# Patient Record
Sex: FEMALE | Race: WHITE | NOT HISPANIC OR LATINO | Employment: OTHER | URBAN - METROPOLITAN AREA
[De-identification: names, ages, dates, MRNs, and addresses within clinical notes are randomized per-mention and may not be internally consistent; named-entity substitution may affect disease eponyms.]

---

## 2017-01-11 ENCOUNTER — ALLSCRIPTS OFFICE VISIT (OUTPATIENT)
Dept: OTHER | Facility: OTHER | Age: 37
End: 2017-01-11

## 2017-04-12 ENCOUNTER — ALLSCRIPTS OFFICE VISIT (OUTPATIENT)
Dept: OTHER | Facility: OTHER | Age: 37
End: 2017-04-12

## 2017-06-13 ENCOUNTER — ALLSCRIPTS OFFICE VISIT (OUTPATIENT)
Dept: OTHER | Facility: OTHER | Age: 37
End: 2017-06-13

## 2017-07-25 ENCOUNTER — GENERIC CONVERSION - ENCOUNTER (OUTPATIENT)
Dept: OTHER | Facility: OTHER | Age: 37
End: 2017-07-25

## 2017-09-05 ENCOUNTER — GENERIC CONVERSION - ENCOUNTER (OUTPATIENT)
Dept: OTHER | Facility: OTHER | Age: 37
End: 2017-09-05

## 2017-09-14 ENCOUNTER — GENERIC CONVERSION - ENCOUNTER (OUTPATIENT)
Dept: OTHER | Facility: OTHER | Age: 37
End: 2017-09-14

## 2017-10-31 RX ORDER — ELETRIPTAN HYDROBROMIDE 40 MG/1
40 TABLET, FILM COATED ORAL ONCE AS NEEDED
COMMUNITY
End: 2018-06-08 | Stop reason: SDUPTHER

## 2017-10-31 RX ORDER — TIZANIDINE HYDROCHLORIDE 2 MG/1
2 CAPSULE, GELATIN COATED ORAL AS NEEDED
COMMUNITY
End: 2018-10-05 | Stop reason: SDUPTHER

## 2017-10-31 RX ORDER — IBUPROFEN 400 MG/1
TABLET ORAL EVERY 6 HOURS PRN
COMMUNITY
End: 2020-12-07 | Stop reason: HOSPADM

## 2017-10-31 NOTE — PRE-PROCEDURE INSTRUCTIONS
Pre-Surgery Instructions:   Medication Instructions    eletriptan (RELPAX) 40 MG tablet Instructed patient per Anesthesia Guidelines   GABAPENTIN PO Instructed patient per Anesthesia Guidelines   ibuprofen (MOTRIN) 400 mg tablet Instructed patient per Anesthesia Guidelines   TiZANidine (ZANAFLEX) 2 MG capsule Instructed patient per Anesthesia Guidelines  Per anesthesia patient was told to stop NSAIDS and supplements one week preop and no medications are needed on morning of surgery

## 2017-11-08 ENCOUNTER — ANESTHESIA EVENT (OUTPATIENT)
Dept: PERIOP | Facility: HOSPITAL | Age: 37
End: 2017-11-08
Payer: COMMERCIAL

## 2017-11-09 ENCOUNTER — ANESTHESIA (OUTPATIENT)
Dept: PERIOP | Facility: HOSPITAL | Age: 37
End: 2017-11-09
Payer: COMMERCIAL

## 2017-11-09 ENCOUNTER — HOSPITAL ENCOUNTER (OUTPATIENT)
Facility: HOSPITAL | Age: 37
Setting detail: OUTPATIENT SURGERY
Discharge: HOME | End: 2017-11-09
Attending: OBSTETRICS & GYNECOLOGY | Admitting: OBSTETRICS & GYNECOLOGY
Payer: COMMERCIAL

## 2017-11-09 VITALS
WEIGHT: 154 LBS | BODY MASS INDEX: 27.29 KG/M2 | HEIGHT: 63 IN | OXYGEN SATURATION: 100 % | HEART RATE: 75 BPM | SYSTOLIC BLOOD PRESSURE: 108 MMHG | TEMPERATURE: 97.7 F | DIASTOLIC BLOOD PRESSURE: 60 MMHG | RESPIRATION RATE: 16 BRPM

## 2017-11-09 DIAGNOSIS — N92.4 EXCESSIVE BLEEDING IN PREMENOPAUSAL PERIOD: ICD-10-CM

## 2017-11-09 LAB — EXT PREGNANCY TEST URINE: NEGATIVE

## 2017-11-09 PROCEDURE — 81025 URINE PREGNANCY TEST: CPT | Performed by: OBSTETRICS & GYNECOLOGY

## 2017-11-09 PROCEDURE — 88305 TISSUE EXAM BY PATHOLOGIST: CPT | Performed by: OBSTETRICS & GYNECOLOGY

## 2017-11-09 RX ORDER — ONDANSETRON 2 MG/ML
INJECTION INTRAMUSCULAR; INTRAVENOUS AS NEEDED
Status: DISCONTINUED | OUTPATIENT
Start: 2017-11-09 | End: 2017-11-09 | Stop reason: SURG

## 2017-11-09 RX ORDER — ONDANSETRON 2 MG/ML
4 INJECTION INTRAMUSCULAR; INTRAVENOUS ONCE AS NEEDED
Status: DISCONTINUED | OUTPATIENT
Start: 2017-11-09 | End: 2017-11-09 | Stop reason: HOSPADM

## 2017-11-09 RX ORDER — SODIUM CHLORIDE 9 MG/ML
INJECTION, SOLUTION INTRAVENOUS AS NEEDED
Status: DISCONTINUED | OUTPATIENT
Start: 2017-11-09 | End: 2017-11-09 | Stop reason: HOSPADM

## 2017-11-09 RX ORDER — ACETAMINOPHEN 325 MG/1
650 TABLET ORAL EVERY 4 HOURS PRN
Status: DISCONTINUED | OUTPATIENT
Start: 2017-11-09 | End: 2017-11-09 | Stop reason: HOSPADM

## 2017-11-09 RX ORDER — PROPOFOL 10 MG/ML
INJECTION, EMULSION INTRAVENOUS AS NEEDED
Status: DISCONTINUED | OUTPATIENT
Start: 2017-11-09 | End: 2017-11-09 | Stop reason: SURG

## 2017-11-09 RX ORDER — ONDANSETRON 2 MG/ML
4 INJECTION INTRAMUSCULAR; INTRAVENOUS EVERY 6 HOURS PRN
Status: DISCONTINUED | OUTPATIENT
Start: 2017-11-09 | End: 2017-11-09 | Stop reason: HOSPADM

## 2017-11-09 RX ORDER — SODIUM CHLORIDE 9 MG/ML
125 INJECTION, SOLUTION INTRAVENOUS CONTINUOUS
Status: DISCONTINUED | OUTPATIENT
Start: 2017-11-09 | End: 2017-11-09 | Stop reason: HOSPADM

## 2017-11-09 RX ORDER — OXYCODONE HYDROCHLORIDE 5 MG/1
10 TABLET ORAL EVERY 4 HOURS PRN
Status: DISCONTINUED | OUTPATIENT
Start: 2017-11-09 | End: 2017-11-09 | Stop reason: HOSPADM

## 2017-11-09 RX ORDER — OXYCODONE HCL 5 MG/5 ML
5 SOLUTION, ORAL ORAL EVERY 4 HOURS PRN
Status: DISCONTINUED | OUTPATIENT
Start: 2017-11-09 | End: 2017-11-09 | Stop reason: HOSPADM

## 2017-11-09 RX ORDER — FENTANYL CITRATE/PF 50 MCG/ML
25 SYRINGE (ML) INJECTION
Status: DISCONTINUED | OUTPATIENT
Start: 2017-11-09 | End: 2017-11-09 | Stop reason: HOSPADM

## 2017-11-09 RX ORDER — MIDAZOLAM HYDROCHLORIDE 1 MG/ML
INJECTION INTRAMUSCULAR; INTRAVENOUS AS NEEDED
Status: DISCONTINUED | OUTPATIENT
Start: 2017-11-09 | End: 2017-11-09 | Stop reason: SURG

## 2017-11-09 RX ORDER — KETOROLAC TROMETHAMINE 30 MG/ML
INJECTION, SOLUTION INTRAMUSCULAR; INTRAVENOUS AS NEEDED
Status: DISCONTINUED | OUTPATIENT
Start: 2017-11-09 | End: 2017-11-09 | Stop reason: SURG

## 2017-11-09 RX ORDER — FERRIC SUBSULFATE 0.21 G/G
LIQUID TOPICAL AS NEEDED
Status: DISCONTINUED | OUTPATIENT
Start: 2017-11-09 | End: 2017-11-09 | Stop reason: HOSPADM

## 2017-11-09 RX ORDER — FENTANYL CITRATE 50 UG/ML
INJECTION, SOLUTION INTRAMUSCULAR; INTRAVENOUS AS NEEDED
Status: DISCONTINUED | OUTPATIENT
Start: 2017-11-09 | End: 2017-11-09 | Stop reason: SURG

## 2017-11-09 RX ADMIN — FENTANYL CITRATE 50 MCG: 50 INJECTION INTRAMUSCULAR; INTRAVENOUS at 08:12

## 2017-11-09 RX ADMIN — FENTANYL CITRATE 50 MCG: 50 INJECTION INTRAMUSCULAR; INTRAVENOUS at 08:50

## 2017-11-09 RX ADMIN — DEXAMETHASONE SODIUM PHOSPHATE 8 MG: 10 INJECTION INTRAMUSCULAR; INTRAVENOUS at 08:09

## 2017-11-09 RX ADMIN — MIDAZOLAM HYDROCHLORIDE 2 MG: 1 INJECTION, SOLUTION INTRAMUSCULAR; INTRAVENOUS at 07:58

## 2017-11-09 RX ADMIN — SODIUM CHLORIDE 125 ML/HR: 0.9 INJECTION, SOLUTION INTRAVENOUS at 07:04

## 2017-11-09 RX ADMIN — PROPOFOL 200 MG: 10 INJECTION, EMULSION INTRAVENOUS at 08:02

## 2017-11-09 RX ADMIN — ONDANSETRON HYDROCHLORIDE 4 MG: 2 INJECTION, SOLUTION INTRAVENOUS at 08:31

## 2017-11-09 RX ADMIN — KETOROLAC TROMETHAMINE 30 MG: 30 INJECTION, SOLUTION INTRAMUSCULAR at 08:32

## 2017-11-09 RX ADMIN — SODIUM CHLORIDE 125 ML/HR: 0.9 INJECTION, SOLUTION INTRAVENOUS at 09:16

## 2017-11-09 NOTE — ANESTHESIA PREPROCEDURE EVALUATION
Review of Systems/Medical History  Patient summary reviewed  Chart reviewed  No history of anesthetic complications     Cardiovascular    Comment: palpitations,  Pulmonary  Negative pulmonary ROS ,        GI/Hepatic  Negative GI/hepatic ROS               Endo/Other     GYN  Negative gynecology ROS          Hematology  Negative hematology ROS      Musculoskeletal  Negative musculoskeletal ROS Back pain , chronic back pain and lumbar pain,        Neurology    Headaches,    Psychology   Negative psychology ROS            Physical Exam    Airway    Mallampati score: II  TM Distance: >3 FB  Neck ROM: full     Dental   No notable dental hx     Cardiovascular  Rhythm: regular, Rate: normal, Cardiovascular exam normal    Pulmonary  Pulmonary exam normal Breath sounds clear to auscultation,     Other Findings        Anesthesia Plan  ASA Score- 2       Anesthesia Type- general with ASA Monitors  Additional Monitors:   Airway Plan:           Induction- intravenous  Informed Consent- Anesthetic plan and risks discussed with patient

## 2017-11-09 NOTE — OP NOTE
OPERATIVE REPORT  PATIENT NAME: Keren Costa    :  1980  MRN: [id number]  Pt Location: [facility] 04    SURGERY DATE: 2017    Surgeon(s) and Role:     * Bolivar Benavides DO - Primary     * Amy Sierra MD - Assisting    Preop Diagnosis:  Excessive bleeding in premenopausal period [N92 4]    Post-Op Diagnosis Codes:     * Excessive bleeding in premenopausal period [N92 4]    Procedure(s) (LRB):  ABLATION ENDOMETRIAL  /ANDREI (N/A)  DILATATION AND CURETTAGE (D&C) WITH HYSTEROSCOPY (N/A)    Specimen(s):  ID Type Source Tests Collected by Time Destination   1 : endometrial curretings Tissue Endometrium TISSUE EXAM Bolivar Benavides DO 2017 0834        Estimated Blood Loss:   Minimal    Drains:  None     Anesthesia Type:   General    Operative Indications:  Excessive bleeding in premenopausal period [N92 4]    Operative Findings:  Normal appearing uterine cavity  Cervical length 5cm, Uterine cavity length 5cm- uterus sounded to 10 5cm  Grade 2 uterine prolapse with traction  Complications:   None    Procedure and Technique:  Patient was taken to the operating room were a time out was performed to confirm correct patient and correct procedure  General LMA anesthesia (LMA) was administered and the patient was positioned on the OR table in the dorsal lithotomy position  All pressure points were padded and a lyndsey hugger was placed to maintain control of core body temperature  A bimanual exam was performed and the uterus was noted to be anteverted, normal in size and consistency with no palpable adnexal masses or fullness  The patient was prepped and draped in the usual sterile fashion  A straight catheter was introduced into the bladder, which was drained of 100cc of clear yellow urine  A weighted speculum was inserted into the vagina and a Can retractor was used to visualize the anterior lip of the cervix, which was then grasped with a single toothed tenaculum   There was moderate descent of the cervix with gentle traction of the tenaculum  The uterus was sounded to 11cm, cervical length was estimated to be 6cm, yielding a total uterine cavity length of 5cm  The cervix was serially dilated to allow for passage of the hysteroscope  Hysteroscope was introduced under direct visualization using normal saline solution as the distention media  Hysteroscope was advanced to the uterine fundus and the entire uterine cavity was inspected in a systematic manner  There was noted to be a normal appearing uterine cavity, both tubal ostia were visualized  Hysteroscope was withdrawn and gentle sharp curetting was performed, starting at the 12'oclock position and rotating a total of 360 degrees to cover all surfaces  Scant endometrial tissue was obtained and sent for pathology  The Qiana device cavity length was set to 5cm  The device was inserted through the cervix and advanced to the uterine fundus  The device was deployed and the uterine width was within the green normal range  A test of the system was performed and cavity integrity and proper placement of the device was confirmed  The Qiana device was activated for 60 seconds  At the conclusion of the cycle the device was completely retracted prior to its removal from the uterine cavity  Inspection of the bipolar electrode showed evidence of burnt endometrial tissue  The single toothed tenaculum was removed from the anterior lip of the cervix  There was mild bleeding from the right tenaculum site which improved with direct pressure and Monsel's solution  Hemostasis was confirmed and the weighted speculum was then removed from the vagina  At the conclusion of the procedure, all needle, sponge, and instrument counts were noted to be correct x2  Dr Vineet Ingram was present and participated in all key portions of the case      Patient Disposition:  PACU     SIGNATURE: Agatha Page MD  DATE: November 9, 2017  TIME: 8:56 AM  Attending Physician/Surgeon Statement  I was present for and participated in all key surgical aspects of this patient's care  I agree with the resident's documentation as stated above

## 2017-11-09 NOTE — DISCHARGE INSTRUCTIONS
Endometrial Ablation   WHAT YOU NEED TO KNOW:   Endometrial ablation is a procedure to destroy the endometrium (lining of your uterus)  You may need this procedure if you have heavy or abnormal vaginal bleeding  DISCHARGE INSTRUCTIONS:   Call 911 for any of the following:   · You feel lightheaded, short of breath, and have chest pain  · You cough up blood  Seek care immediately if:   · Your arm or leg feels warm, tender, and painful  It may look swollen and red  · You feel dizzy, weak, and confused  · You cannot stop vomiting  · You have severe pain  · You are not able to urinate  Contact your healthcare provider if:   · You have a fever  · You have vaginal bleeding and it is not time for your monthly period  · The bleeding during your monthly period has not decreased  · You have pain when you urinate or see blood in your urine  · You have questions or concerns about your condition or care  Medicines:   · Medicines  can help decrease pain, calm your stomach, and control vomiting  · Take your medicine as directed  Contact your healthcare provider if you think your medicine is not helping or if you have side effects  Tell him or her if you are allergic to any medicine  Keep a list of the medicines, vitamins, and herbs you take  Include the amounts, and when and why you take them  Bring the list or the pill bottles to follow-up visits  Carry your medicine list with you in case of an emergency  Activity:  Ask when you can return to your usual activities  Do not have sex or use tampons or douches for 6 weeks after your procedure, or as directed  Birth control: You may still need to use birth control to prevent pregnancy  Pregnancy risks, such as a miscarriage and tubal pregnancy, are higher after this procedure  Talk to your healthcare provider about birth control or pregnancy after endometrial ablation    Follow up with your healthcare provider as directed:  Write down your questions so you remember to ask them during your visits  © 2017 [employer] Information is for End User's use only and may not be sold, redistributed or otherwise used for commercial purposes  All illustrations and images included in CareNotes® are the copyrighted property of A D A M [employer] Inc  or [employer]  The above information is an  only  It is not intended as medical advice for individual conditions or treatments  Talk to your doctor, nurse or pharmacist before following any medical regimen to see if it is safe and effective for you

## 2017-12-19 ENCOUNTER — ALLSCRIPTS OFFICE VISIT (OUTPATIENT)
Dept: OTHER | Facility: OTHER | Age: 37
End: 2017-12-19

## 2017-12-26 ENCOUNTER — HOSPITAL ENCOUNTER (EMERGENCY)
Facility: HOSPITAL | Age: 37
Discharge: HOME | End: 2017-12-26
Payer: COMMERCIAL

## 2017-12-26 VITALS
DIASTOLIC BLOOD PRESSURE: 73 MMHG | RESPIRATION RATE: 18 BRPM | HEART RATE: 117 BPM | WEIGHT: 161.82 LBS | BODY MASS INDEX: 28.66 KG/M2 | OXYGEN SATURATION: 98 % | TEMPERATURE: 98 F | SYSTOLIC BLOOD PRESSURE: 128 MMHG

## 2017-12-26 DIAGNOSIS — T81.31XA WOUND DISRUPTION, POST-OP, SKIN, INITIAL ENCOUNTER: Primary | ICD-10-CM

## 2017-12-26 PROCEDURE — 99283 EMERGENCY DEPT VISIT LOW MDM: CPT

## 2017-12-26 RX ORDER — BUPIVACAINE HYDROCHLORIDE AND EPINEPHRINE 5; 5 MG/ML; UG/ML
10 INJECTION, SOLUTION PERINEURAL ONCE
Status: COMPLETED | OUTPATIENT
Start: 2017-12-26 | End: 2017-12-26

## 2017-12-26 RX ORDER — BUPIVACAINE HYDROCHLORIDE AND EPINEPHRINE 5; 5 MG/ML; UG/ML
10 INJECTION, SOLUTION PERINEURAL ONCE
Status: DISCONTINUED | OUTPATIENT
Start: 2017-12-26 | End: 2017-12-26

## 2017-12-26 RX ADMIN — BUPIVACAINE HYDROCHLORIDE AND EPINEPHRINE BITARTRATE 10 ML: 5; .005 INJECTION, SOLUTION PERINEURAL at 18:29

## 2017-12-26 NOTE — ED PROVIDER NOTES
History  Chief Complaint   Patient presents with    Post-op Problem     Pt reports labiaplasty done today outpt around 26 [id number], discahrged to home and has had increased bleeding, referred by surgeon to ED  HPI    Prior to Admission Medications   Prescriptions Last Dose Informant Patient Reported? Taking? GABAPENTIN PO   Yes No   Sig: Take 200 mg by mouth daily at bedtime   TiZANidine (ZANAFLEX) 2 MG capsule   Yes No   Sig: Take 2 mg by mouth as needed for muscle spasms   eletriptan (RELPAX) 40 MG tablet   Yes No   Sig: Take 40 mg by mouth once as needed for migraine may repeat in 2 hours if necessary   ibuprofen (MOTRIN) 400 mg tablet   Yes No   Sig: Take by mouth every 6 (six) hours as needed for mild pain      Facility-Administered Medications: None       Past Medical History:   Diagnosis Date    Back pain     Irregular heart beat     Palpitations    Menorrhagia     Migraines        Past Surgical History:   Procedure Laterality Date    OVARIAN CYST SURGERY      MD HYSTEROSCOPY,W/ENDO BX N/A 11/9/2017    Procedure: DILATATION AND CURETTAGE (D&C) WITH HYSTEROSCOPY;  Surgeon: Noel Wilson DO;  Location: [facility];  Service: Gynecology    MD HYSTEROSCOPY,W/ENDOMETRIAL ABLATION N/A 11/9/2017    Procedure: ABLATION ENDOMETRIAL  Dunham Aquas;  Surgeon: Noel Wilson DO;  Location: [facility];  Service: Gynecology    WISDOM TOOTH EXTRACTION         History reviewed  No pertinent family history  I have reviewed and agree with the history as documented      Social History   Substance Use Topics    Smoking status: Never Smoker    Smokeless tobacco: Never Used    Alcohol use No        Review of Systems    Physical Exam  ED Triage Vitals [12/26/17 1728]   Temperature Pulse Respirations Blood Pressure SpO2   98 °F (36 7 °C) (!) 117 18 128/73 98 %      Temp Source Heart Rate Source Patient Position - Orthostatic VS BP Location FiO2 (%)   Oral Monitor Sitting Right arm --      Pain Score       3 Orthostatic Vital Signs  Vitals:    12/26/17 1728   BP: 128/73   Pulse: (!) 117   Patient Position - Orthostatic VS: Sitting       Physical Exam  Bleeding noted from right labia minora at area of suture in the middle of the surgical site  Left labia with some clot adhered  Good hemostasis on left  ED Medications  Medications   bupivacaine-epinephrine (MARCAINE/EPINEPHRINE) 0 5 %-1:[id number] injection 10 mL (not administered)       Diagnostic Studies  Results Reviewed     None                 No orders to display              Procedures  Procedures- Area of bleeding injected with  5% bupivicaine with epi   2 sutures of 3-O vicryl placed above and below the area of bleeding  Good hemostasis noted after observing for 5 minutes  Phone Contacts  ED Phone Contact    ED Course  ED Course        Hemostasis achieved after sutures placed  Patient in stable condition  MDM  Number of Diagnoses or Management Options  Risk of Complications, Morbidity, and/or Mortality  Presenting problems: minimal  Diagnostic procedures: moderate  Management options: moderate      CritCare Time    Disposition  Final diagnoses:   None     ED Disposition     None      Follow-up Information    None       Patient's Medications   Discharge Prescriptions    No medications on file     No discharge procedures on file      ED Provider  Electronically Signed by           Ewing Holter, DO  12/26/17 2098

## 2017-12-26 NOTE — ED NOTES
Call made to [facility] of life on-call service   Pending call back from Dr Millicent Dunaway (GYN)     Jocelyn Butler RN  12/26/17 4264

## 2017-12-26 NOTE — ED NOTES
Dr Sherita Valdivia responded at this time, will be down at this time        Bryan Rosen, TEA  12/26/17 3869

## 2017-12-27 NOTE — PROGRESS NOTES
Chief Complaint   Patient presents for Botox injections  Current Meds   1  Botox 200 UNIT Injection Solution Reconstituted; Pt gets injections once every 91 days     for chronic migraines; Therapy: (Recorded:80Ecs1793) to Recorded  2  Gabapentin 100 MG Oral Capsule; TAKE 2 CAPSULES AT BEDTIME; Therapy: 36PKK6613 to (Arielle Gil)  Requested for: 49LJO0650; Last     Rx:[id number] Ordered  3  Ketorolac Tromethamine 10 MG Oral Tablet; TAKE 1 TABLET EVERY 8 HOURS AS     NEEDED FOR PAIN;     Therapy: 65ARQ5036 to (Last Rx:13Mar2017)  Requested for: 06UTC1512 Ordered  4  Relpax 40 MG Oral Tablet; TAKE 1 TABLET AT ONSET OF MIGRAINE  MAY REPEAT IN 2     HOURS  MAX 2 DOSES IN 24 HOURS, NO MORE THAN 3 DAYS PER WEEK  9 TABLETS     MONTHLY; Therapy: 87GYK5457 to ([phone])  Requested for: 46Xps4166; Last     Rx:[id number] Ordered  5  TiZANidine HCl - 2 MG Oral Capsule; 1-2 tabs qhs prn neck pain; Therapy: 00LMZ9337 to (Evaluate:06Jan2017)  Requested for: 65Azt6747; Last     Rx:[id number] Ordered    Allergies   1  No Known Drug Allergies    Vitals    Recorded: 16RSV4742 08:53AM   Temperature 59 0 F   Systolic 401   Diastolic 80     Procedure   Procedure: Headache botox injection  Indication: Chronic migraine headache  Risks, benefits and alternatives were discussed with the patient  We discussed possible complications, including infection,-- bleeding-- and-- allergic reaction  Written consent was obtained prior to the procedure  The site was prepped with an alcohol swab  Anesthesia: No anesthesia was needed  Procedure Note:      200 --Mml of Botox-A and       5 unit(s) was injected into the procerus muscle  5 unit(s) was injected into the  right  muscle  5 unit(s) was injected into the  left  muscle  10 unit(s) was injected into the  right frontalis muscle  10 unit(s) was injected into the  left frontalis muscle      20 unit(s) was injected into the  right temporalis muscle  20 unit(s) was injected into the  left temporalis muscle  15 unit(s) was injected into the  right occipitalis muscle  15 unit(s) was injected into the  left occipitalis muscle  10 unit(s) was injected into the  right cervical paraspinal muscle  10 unit(s) was injected into the  left cervical paraspinal muscle  15 unit(s) was injected into the  right trapezius muscle  15 unit(s) was injected into the  left trapezius muscle  A total of 155units were used  A total of 45units were discarded  Botox Lot:  Lot number: [id number] -- Expiration date: JUNE 2020  Patient Status:  the patient tolerated the procedure well  Complications:  there were no complications  Follow-up in the office in 3 month(s)  Assessment   1  Chronic migraine w/o aura w/o status migrainosus, not intractable (346 70) (G43 709)    Plan   Chronic migraine w/o aura w/o status migrainosus, not intractable    · Administered: Botox 200 UNIT Injection Solution Reconstituted  Rx By: Kristina JUAREZ;For: Chronic migraine w/o aura w/o status migrainosus, not     intractable; Dose of 200 UNIT; Other; VIJAYA = N; Administered: 12/19/2017 9:02:00 AM   · Chemodenervation of muscles innervated by facial, trigeminal, c-spine, accessory    nerves - POC; Status:Need Information - Financial Authorization; Requested    for:65Ptt5620;   Perform: In Office; Due:21Pnc8733; Ordered; For:Chronic migraine w/o aura w/o status     migrainosus, not intractable; Ordered By:Lou Snyder;   · Follow-up visit in 3 months Evaluation and Treatment  Follow-up  Status: Complete     Done: 76WDJ0972  Ordered; For: Chronic migraine w/o aura w/o status migrainosus, not intractable;      Ordered By: Carlene Medeiros  Performed:   Due: 87ELH9569; Last Updated     By: Renetta Strange; 12/19/2017 9:50:19 AM    Future Appointments      Date/Time Provider Specialty Site   01/11/2018 09:00 AM Carlene Medeiros, [facility]  [facility]  [place]   03/19/2018 11:15 AM Marcella Avila, [facility]  [id number]     Signatures    Electronically signed by :  Radha Huddleston, [facility]; Dec 19 2017  9:17AM EST                       (Author)     Electronically signed by : Aguilar Quach MD; Dec 26 2017  4:18PM EST                       (Author)

## 2018-01-11 ENCOUNTER — GENERIC CONVERSION - ENCOUNTER (OUTPATIENT)
Dept: OTHER | Facility: OTHER | Age: 38
End: 2018-01-11

## 2018-01-12 VITALS
BODY MASS INDEX: 26.86 KG/M2 | SYSTOLIC BLOOD PRESSURE: 125 MMHG | HEART RATE: 105 BPM | HEIGHT: 63 IN | DIASTOLIC BLOOD PRESSURE: 84 MMHG | WEIGHT: 151.56 LBS | RESPIRATION RATE: 18 BRPM

## 2018-01-13 VITALS
DIASTOLIC BLOOD PRESSURE: 74 MMHG | BODY MASS INDEX: 30.36 KG/M2 | WEIGHT: 171.38 LBS | SYSTOLIC BLOOD PRESSURE: 114 MMHG | HEART RATE: 91 BPM | RESPIRATION RATE: 16 BRPM

## 2018-01-14 VITALS — SYSTOLIC BLOOD PRESSURE: 116 MMHG | DIASTOLIC BLOOD PRESSURE: 73 MMHG | TEMPERATURE: 98.5 F

## 2018-01-22 VITALS
SYSTOLIC BLOOD PRESSURE: 120 MMHG | BODY MASS INDEX: 27.46 KG/M2 | HEART RATE: 99 BPM | WEIGHT: 155 LBS | DIASTOLIC BLOOD PRESSURE: 72 MMHG

## 2018-01-22 VITALS — DIASTOLIC BLOOD PRESSURE: 64 MMHG | TEMPERATURE: 98.3 F | SYSTOLIC BLOOD PRESSURE: 110 MMHG

## 2018-01-23 VITALS — DIASTOLIC BLOOD PRESSURE: 80 MMHG | SYSTOLIC BLOOD PRESSURE: 120 MMHG | TEMPERATURE: 98.1 F

## 2018-01-24 VITALS
BODY MASS INDEX: 27.64 KG/M2 | RESPIRATION RATE: 14 BRPM | SYSTOLIC BLOOD PRESSURE: 110 MMHG | DIASTOLIC BLOOD PRESSURE: 70 MMHG | HEART RATE: 83 BPM | OXYGEN SATURATION: 96 % | WEIGHT: 156 LBS | HEIGHT: 63 IN

## 2018-03-14 ENCOUNTER — TELEPHONE (OUTPATIENT)
Dept: NEUROLOGY | Facility: CLINIC | Age: 38
End: 2018-03-14

## 2018-03-14 NOTE — TELEPHONE ENCOUNTER
pt called and states that she would like to do botox as usual   she states that you had talked about doing doing less injections at next office

## 2018-03-19 ENCOUNTER — PROCEDURE VISIT (OUTPATIENT)
Dept: NEUROLOGY | Facility: CLINIC | Age: 38
End: 2018-03-19
Payer: COMMERCIAL

## 2018-03-19 VITALS — TEMPERATURE: 98.4 F | DIASTOLIC BLOOD PRESSURE: 70 MMHG | SYSTOLIC BLOOD PRESSURE: 118 MMHG

## 2018-03-19 DIAGNOSIS — G43.709 CHRONIC MIGRAINE WITHOUT AURA WITHOUT STATUS MIGRAINOSUS, NOT INTRACTABLE: Primary | ICD-10-CM

## 2018-03-19 PROCEDURE — 64615 CHEMODENERV MUSC MIGRAINE: CPT | Performed by: PHYSICIAN ASSISTANT

## 2018-03-19 NOTE — PROGRESS NOTES
Chemodenervation  Date/Time: 3/19/2018 11:37 AM  Performed by: Brigida Veras  Authorized by: Brigida Veras     Pre-procedure details:     Prepped With: Alcohol    Procedure details:     Position:  Upright  Botox:     Botox Type:  Type A    Brand:  Botox    mL's of Botulinum Toxin:  165    Final Concentration per CC:  50 units    Needle Gauge:  30 G 2 5 inch  Procedures:     Botox Procedures: chronic headache      Indications: migraines    Injection Location:     Head / Face:  L , R , L frontalis, R frontalis, R inferior cervical paraspinal, L inferior cervical paraspinal, L medial occipitalis, R medial occipitalis, L lateral occipitalis, R lateral occipitalis, procerus, L temporalis, R temporalis, R superior trapezius, L superior trapezius and masseter    L  injection amount:  5 unit(s)    R  injection amount:  5 unit(s)    L lateral frontalis:  5 unit(s)    R lateral frontalis:  5 unit(s)    L medial frontalis:  5 unit(s)    R medial frontalis:  5 unit(s)    L temporalis injection amount:  20 unit(s)    R temporalis injection amount:  20 unit(s)    Procerus injection amount:  5 unit(s)    L Masseter injection amount:  5 unit(s)    R Masseter injection amount:  5 unit(s)    Comments:  Medically necessary    L lateral occipitalis injection amount:  5 unit(s)    R lateral occipitalis injection amount:  5 unit(s)    L medial occipitalis injection amount:  10 unit(s)    R medial occipitalis injection amount:  10 unit(s)    L inferior cervical paraspinal injection amount:  10 unit(s)    R inferior cervical paraspinal injection amount:  10 unit(s)    L superior trapezius injection amount:  15 unit(s)    R superior trapezius injection amount:  15 unit(s)  Total Units:     Total units used:  165    Total units discarded:  35  Post-procedure details:     Chemodenervation:  Chronic migraine    Facial Nerve Location[de-identified]  Bilateral facial nerve    Patient tolerance of procedure: Tolerated well, no immediate complications

## 2018-05-16 DIAGNOSIS — G43.701 CHRONIC MIGRAINE WITHOUT AURA WITH STATUS MIGRAINOSUS, NOT INTRACTABLE: Primary | ICD-10-CM

## 2018-05-16 RX ORDER — GABAPENTIN 100 MG/1
CAPSULE ORAL
Qty: 180 CAPSULE | Refills: 3 | Status: SHIPPED | OUTPATIENT
Start: 2018-05-16 | End: 2019-04-14 | Stop reason: SDUPTHER

## 2018-06-08 DIAGNOSIS — G43.709 CHRONIC MIGRAINE WITHOUT AURA WITHOUT STATUS MIGRAINOSUS, NOT INTRACTABLE: Primary | ICD-10-CM

## 2018-06-11 RX ORDER — ELETRIPTAN HYDROBROMIDE 40 MG/1
40 TABLET, FILM COATED ORAL ONCE AS NEEDED
Qty: 9 TABLET | Refills: 2 | Status: SHIPPED | OUTPATIENT
Start: 2018-06-11 | End: 2018-09-24 | Stop reason: SDUPTHER

## 2018-06-11 RX ORDER — ELETRIPTAN HYDROBROMIDE 40 MG/1
40 TABLET, FILM COATED ORAL ONCE AS NEEDED
Qty: 9 TABLET | Refills: 5 | Status: SHIPPED | OUTPATIENT
Start: 2018-06-11 | End: 2018-06-11 | Stop reason: SDUPTHER

## 2018-06-27 ENCOUNTER — TELEPHONE (OUTPATIENT)
Dept: NEUROLOGY | Facility: CLINIC | Age: 38
End: 2018-06-27

## 2018-06-27 ENCOUNTER — DOCUMENTATION (OUTPATIENT)
Dept: NEUROLOGY | Facility: CLINIC | Age: 38
End: 2018-06-27

## 2018-06-27 NOTE — PROGRESS NOTES
Botox 200 units received from [facility] on 6/27/18  Botox stored in the fridge until patient's upcoming appointment on 7/3/18

## 2018-06-27 NOTE — TELEPHONE ENCOUNTER
Lmom for pt to notify her that due to an outstanding balance, her speciality pharmacy will not ship her botox  I informed her we may need to cancel her appointment for Tuesday July 3rd if payment has not been made this week to guarantee on time delivery

## 2018-06-28 NOTE — TELEPHONE ENCOUNTER
Pt called and advised pt of all of the below  She verbalized clear understanding of all instructions  She states that she called speciality pharmacy and paid her bill  She was told by the pharmacy that they will ship the botox overnight and should be arriving today or tmrw  Confirmed botox appt 7/3/18 @ 12:30

## 2018-07-03 ENCOUNTER — PROCEDURE VISIT (OUTPATIENT)
Dept: NEUROLOGY | Facility: CLINIC | Age: 38
End: 2018-07-03
Payer: COMMERCIAL

## 2018-07-03 VITALS — DIASTOLIC BLOOD PRESSURE: 66 MMHG | SYSTOLIC BLOOD PRESSURE: 90 MMHG | TEMPERATURE: 98.4 F

## 2018-07-03 DIAGNOSIS — G43.709 CHRONIC MIGRAINE WITHOUT AURA WITHOUT STATUS MIGRAINOSUS, NOT INTRACTABLE: Primary | ICD-10-CM

## 2018-07-03 PROCEDURE — 64615 CHEMODENERV MUSC MIGRAINE: CPT | Performed by: PHYSICIAN ASSISTANT

## 2018-07-03 NOTE — PROGRESS NOTES
Chemodenervation  Date/Time: 7/3/2018 12:52 PM  Performed by: Junito Olmedo  Authorized by: Junito Olmedo     Pre-procedure details:     Prepped With: Alcohol    Procedure details:     Position:  Upright  Botox:     Botox Type:  Type A    Brand:  Botox    mL's of Botulinum Toxin:  155    Final Concentration per CC:  50 units    Needle Gauge:  30 G 2 5 inch  Procedures:     Botox Procedures: chronic headache      Indications: migraines    Injection Location:     Head / Face:  L , R , L frontalis, R frontalis, R inferior cervical paraspinal, L inferior cervical paraspinal, L medial occipitalis, R medial occipitalis, L lateral occipitalis, R lateral occipitalis, procerus, L temporalis, R temporalis, R superior trapezius and L superior trapezius    L  injection amount:  5 unit(s)    R  injection amount:  5 unit(s)    L lateral frontalis:  5 unit(s)    R lateral frontalis:  5 unit(s)    L medial frontalis:  5 unit(s)    R medial frontalis:  5 unit(s)    L temporalis injection amount:  20 unit(s)    R temporalis injection amount:  20 unit(s)    Procerus injection amount:  5 unit(s)    L lateral occipitalis injection amount:  5 unit(s)    R lateral occipitalis injection amount:  5 unit(s)    L medial occipitalis injection amount:  10 unit(s)    R medial occipitalis injection amount:  10 unit(s)    L inferior cervical paraspinal injection amount:  10 unit(s)    R inferior cervical paraspinal injection amount:  10 unit(s)    L superior trapezius injection amount:  15 unit(s)    R superior trapezius injection amount:  15 unit(s)  Total Units:     Total units used:  155    Total units discarded:  45  Post-procedure details:     Chemodenervation:  Chronic migraine    Patient tolerance of procedure:   Tolerated well, no immediate complications

## 2018-09-10 ENCOUNTER — TELEPHONE (OUTPATIENT)
Dept: NEUROLOGY | Facility: CLINIC | Age: 38
End: 2018-09-10

## 2018-09-10 NOTE — TELEPHONE ENCOUNTER
lmom awaiting a call back  When patient calls back please let her know she has to call [facility] to take care of her co-pay for her Botox  I am unable to set up shipment until she pays her co-pay  Please let patient know she should call to do this asap so I can set up shipment      Please give the patient Barry's phone number listed below      Phone: [phone]    Thanks

## 2018-09-11 NOTE — TELEPHONE ENCOUNTER
Susan Galarza from [facility] called re: botox delivery date  Loan Perea not avail  Spoke w/ Karen Floyd MA  Delivery date set for tmrw

## 2018-09-12 ENCOUNTER — DOCUMENTATION (OUTPATIENT)
Dept: NEUROLOGY | Facility: CLINIC | Age: 38
End: 2018-09-12

## 2018-09-24 DIAGNOSIS — G43.709 CHRONIC MIGRAINE WITHOUT AURA WITHOUT STATUS MIGRAINOSUS, NOT INTRACTABLE: ICD-10-CM

## 2018-09-24 RX ORDER — ELETRIPTAN HYDROBROMIDE 40 MG/1
TABLET, FILM COATED ORAL
Qty: 9 TABLET | Refills: 2 | Status: SHIPPED | OUTPATIENT
Start: 2018-09-24 | End: 2019-10-11 | Stop reason: ALTCHOICE

## 2018-10-05 ENCOUNTER — PROCEDURE VISIT (OUTPATIENT)
Dept: NEUROLOGY | Facility: CLINIC | Age: 38
End: 2018-10-05
Payer: COMMERCIAL

## 2018-10-05 VITALS
TEMPERATURE: 98.3 F | SYSTOLIC BLOOD PRESSURE: 120 MMHG | WEIGHT: 152 LBS | HEART RATE: 95 BPM | DIASTOLIC BLOOD PRESSURE: 88 MMHG | BODY MASS INDEX: 26.93 KG/M2 | HEIGHT: 63 IN

## 2018-10-05 DIAGNOSIS — G43.709 CHRONIC MIGRAINE WITHOUT AURA WITHOUT STATUS MIGRAINOSUS, NOT INTRACTABLE: Primary | ICD-10-CM

## 2018-10-05 PROCEDURE — 64615 CHEMODENERV MUSC MIGRAINE: CPT | Performed by: PHYSICIAN ASSISTANT

## 2018-10-05 RX ORDER — TIZANIDINE HYDROCHLORIDE 2 MG/1
2 CAPSULE, GELATIN COATED ORAL AS NEEDED
Qty: 30 CAPSULE | Refills: 0 | Status: SHIPPED | OUTPATIENT
Start: 2018-10-05 | End: 2018-12-02 | Stop reason: SDUPTHER

## 2018-10-05 NOTE — PROGRESS NOTES
Chemodenervation  Date/Time: 10/5/2018 10:27 AM  Performed by: Viky Vanegas  Authorized by: Viky Vanegas     Pre-procedure details:     Prepped With: Alcohol    Procedure details:     Position:  Upright  Botox:     Botox Type:  Type A    Brand:  Botox    mL's of Botulinum Toxin:  155    Final Concentration per CC:  50 units    Needle Gauge:  30 G 2 5 inch  Procedures:     Botox Procedures: chronic headache      Indications: migraines    Injection Location:     Head / Face:  L , R , L frontalis, R frontalis, R inferior cervical paraspinal, L inferior cervical paraspinal, L medial occipitalis, R medial occipitalis, L lateral occipitalis, R lateral occipitalis, procerus, L temporalis, R temporalis, R superior trapezius and L superior trapezius    L  injection amount:  5 unit(s)    R  injection amount:  5 unit(s)    L lateral frontalis:  5 unit(s)    R lateral frontalis:  5 unit(s)    L medial frontalis:  5 unit(s)    R medial frontalis:  5 unit(s)    L temporalis injection amount:  20 unit(s)    R temporalis injection amount:  20 unit(s)    Procerus injection amount:  5 unit(s)    L lateral occipitalis injection amount:  5 unit(s)    R lateral occipitalis injection amount:  5 unit(s)    L medial occipitalis injection amount:  10 unit(s)    R medial occipitalis injection amount:  10 unit(s)    L inferior cervical paraspinal injection amount:  10 unit(s)    R inferior cervical paraspinal injection amount:  10 unit(s)    L superior trapezius injection amount:  15 unit(s)    R superior trapezius injection amount:  15 unit(s)  Total Units:     Total units used:  155    Total units discarded:  45  Post-procedure details:     Chemodenervation:  Chronic migraine    Facial Nerve Location[de-identified]  Bilateral facial nerve    Patient tolerance of procedure:   Tolerated well, no immediate complications        Vitals:    10/05/18 0958   BP: 120/88   Pulse: 95   Temp: 98 3 °F (36 8 °C) Refilled tizanidine qhs prn neck pain

## 2018-11-30 ENCOUNTER — TELEPHONE (OUTPATIENT)
Dept: NEUROLOGY | Facility: CLINIC | Age: 38
End: 2018-11-30

## 2018-11-30 NOTE — TELEPHONE ENCOUNTER
lmom awaiting a call back  When patient calls back please transfer directly to me  I will need to reschedule the patient's Botox appointment  Patient's appointment is scheduled for 12/28/18, but this is under 90 days  Patient's last BINJ apt was on 10/5/18- will need to r/s for after 1/5/18

## 2018-12-02 DIAGNOSIS — G43.709 CHRONIC MIGRAINE WITHOUT AURA WITHOUT STATUS MIGRAINOSUS, NOT INTRACTABLE: ICD-10-CM

## 2018-12-03 RX ORDER — TIZANIDINE HYDROCHLORIDE 2 MG/1
2 CAPSULE, GELATIN COATED ORAL AS NEEDED
Qty: 30 CAPSULE | Refills: 0 | Status: SHIPPED | OUTPATIENT
Start: 2018-12-03 | End: 2018-12-31 | Stop reason: SDUPTHER

## 2018-12-06 NOTE — TELEPHONE ENCOUNTER
Patient called back stating that she was told by Erich Browning that she can schedule her Botox before the 90 day period  She states she has been working with her [employer] and they told her that she would be able to get it prior to the 90 days and that they would pay for the claim  I did advise the patient that I would need to check with our auth team because they advised me to schedule within the 90 day window  Will speak with Angie Parkinson to clarify

## 2018-12-11 NOTE — TELEPHONE ENCOUNTER
Spoke with vannesa in regards to this issue- she states that the patient might get a bill for the office visit since it is not in the 90 day window as mandated by the insurance  Will call and advise the patient of this

## 2018-12-11 NOTE — TELEPHONE ENCOUNTER
Called and spoke with the patient- advised her of the below  Patient states she was worried about her Botox not getting delivered- she states that her co-pays for the medication itself has been going up and she wanted to get it before the end of the year for cost purposes  She states her Botox will be arriving at our office soon  I did advise the patient that even if the insurance states they will pay there is a chance that they will not pay and she will be stuck with a bill for the office visit which would be the patient's responsbility  Patient verbally understood and states she is unsure what she should do, she states she has to pay a portion of it anyway because of how her insurance works  Patient states she will discuss this with her  for his opinon and she will give me a call back  Last Botox injection: 10/5/18  Current appointment: 12/28/18  Patient would be due for Botox on 1/6/19

## 2018-12-12 ENCOUNTER — DOCUMENTATION (OUTPATIENT)
Dept: NEUROLOGY | Facility: CLINIC | Age: 38
End: 2018-12-12

## 2018-12-12 NOTE — TELEPHONE ENCOUNTER
Per Angie Parkinson and Demetris must be in the 90 days as per that is our policy  Will call patient to discuss this with her

## 2018-12-12 NOTE — PROGRESS NOTES
Botox 200 units- Received from [facility] at the [address] [place] [address] on 12/12/18  Botox stored in the fridge until the patient's upcoming appointment on 12/28/18

## 2018-12-12 NOTE — TELEPHONE ENCOUNTER
Patient called in and states she will be keeping her Botox appointment for 12/28/18- despite the possibly of it not being covered  Botox was refilled by the patient and received in our office today 12/12/18  Notes were made in the patient's chart  Will call and update Claude Loh

## 2018-12-14 NOTE — TELEPHONE ENCOUNTER
Called and spoke with the patient- advised her that we cannot have her come in before the 90 days due to FDA guidelines and our office policy  Patient verbalized the understanding and is rescheduled for 1/8/19 at 730 am with Nai Parada

## 2018-12-31 DIAGNOSIS — G43.709 CHRONIC MIGRAINE WITHOUT AURA WITHOUT STATUS MIGRAINOSUS, NOT INTRACTABLE: ICD-10-CM

## 2018-12-31 RX ORDER — TIZANIDINE HYDROCHLORIDE 2 MG/1
2 CAPSULE, GELATIN COATED ORAL AS NEEDED
Qty: 30 CAPSULE | Refills: 0 | Status: SHIPPED | OUTPATIENT
Start: 2018-12-31 | End: 2019-01-27 | Stop reason: SDUPTHER

## 2019-01-08 ENCOUNTER — PROCEDURE VISIT (OUTPATIENT)
Dept: NEUROLOGY | Facility: CLINIC | Age: 39
End: 2019-01-08
Payer: COMMERCIAL

## 2019-01-08 VITALS — TEMPERATURE: 98.6 F | HEART RATE: 101 BPM | SYSTOLIC BLOOD PRESSURE: 106 MMHG | DIASTOLIC BLOOD PRESSURE: 82 MMHG

## 2019-01-08 DIAGNOSIS — G43.709 CHRONIC MIGRAINE WITHOUT AURA WITHOUT STATUS MIGRAINOSUS, NOT INTRACTABLE: Primary | ICD-10-CM

## 2019-01-08 PROCEDURE — 64615 CHEMODENERV MUSC MIGRAINE: CPT | Performed by: PHYSICIAN ASSISTANT

## 2019-01-08 NOTE — PROGRESS NOTES
Chemodenervation  Date/Time: 1/8/2019 7:34 AM  Performed by: Adolfo Abbott  Authorized by: Adolfo Abbott     Pre-procedure details:     Prepped With: Alcohol    Procedure details:     Position:  Upright  Botox:     Botox Type:  Type A    Brand:  Botox    mL's of Botulinum Toxin:  155    Final Concentration per CC:  50 units    Needle Gauge:  30 G 2 5 inch  Procedures:     Botox Procedures: chronic headache      Indications: migraines    Injection Location:     Head / Face:  L , R , L frontalis, R frontalis, R inferior cervical paraspinal, L inferior cervical paraspinal, L medial occipitalis, R medial occipitalis, L lateral occipitalis, R lateral occipitalis, procerus, L temporalis, R temporalis, R superior trapezius and L superior trapezius    L  injection amount:  5 unit(s)    R  injection amount:  5 unit(s)    L lateral frontalis:  5 unit(s)    R lateral frontalis:  5 unit(s)    L medial frontalis:  5 unit(s)    R medial frontalis:  5 unit(s)    L temporalis injection amount:  20 unit(s)    R temporalis injection amount:  20 unit(s)    Procerus injection amount:  5 unit(s)    L lateral occipitalis injection amount:  5 unit(s)    R lateral occipitalis injection amount:  5 unit(s)    L medial occipitalis injection amount:  10 unit(s)    R medial occipitalis injection amount:  10 unit(s)    L inferior cervical paraspinal injection amount:  10 unit(s)    R inferior cervical paraspinal injection amount:  10 unit(s)    L superior trapezius injection amount:  15 unit(s)    R superior trapezius injection amount:  15 unit(s)  Total Units:     Total units used:  155    Total units discarded:  45  Post-procedure details:     Chemodenervation:  Chronic migraine    Facial Nerve Location[de-identified]  Bilateral facial nerve    Patient tolerance of procedure:   Tolerated well, no immediate complications        Vitals:    01/08/19 0729   BP: 106/82   Pulse: 101   Temp: 98 6 °F (37 °C)

## 2019-01-27 DIAGNOSIS — G43.709 CHRONIC MIGRAINE WITHOUT AURA WITHOUT STATUS MIGRAINOSUS, NOT INTRACTABLE: ICD-10-CM

## 2019-01-27 RX ORDER — TIZANIDINE HYDROCHLORIDE 2 MG/1
2 CAPSULE, GELATIN COATED ORAL AS NEEDED
Qty: 30 CAPSULE | Refills: 0 | Status: SHIPPED | OUTPATIENT
Start: 2019-01-27 | End: 2019-04-25 | Stop reason: SDUPTHER

## 2019-03-15 ENCOUNTER — TELEPHONE (OUTPATIENT)
Dept: NEUROLOGY | Facility: CLINIC | Age: 39
End: 2019-03-15

## 2019-03-15 NOTE — TELEPHONE ENCOUNTER
General 03/15/2019  2:07 PM Mary Giordano care coordination  -   Note    Botox- authorization #: [id number] visit- valid from 6/6/2018 until 6/6/2019  Please use [address]         Thank you!

## 2019-03-15 NOTE — TELEPHONE ENCOUNTER
Patient is scheduled on 4/9/19 with [facility] at the [facility] [place] location

## 2019-04-02 NOTE — TELEPHONE ENCOUNTER
Spoke with patient and provided information that I have ordered Botox and requested delivery by Thursday 4/4/19  I did state I will follow up tomorrow

## 2019-04-02 NOTE — TELEPHONE ENCOUNTER
Patient calling again regarding botox  Informed her [facility] is out of the office and will be back tomorrow  Patient stated she needs to hear from someone today, she has an appt on 4/9 and hasn't heard anything regarding it, patient getting anxious  Can someone please look into this

## 2019-04-02 NOTE — TELEPHONE ENCOUNTER
No Botox in [place] office per [facility] and spoke with rep Yaquelin Wheeler to request Botox 200 units  Requested STAT as we need this delivered by this Thursday 4/5/19  Check back tomorrow

## 2019-04-04 NOTE — TELEPHONE ENCOUNTER
Attempted to follow up with Barry miltong Botox but when I provided Dr Fox Brady and [facility]'s NPI they would not release information to me because they did not match what was on file

## 2019-04-04 NOTE — TELEPHONE ENCOUNTER
[facility]- spoke with Gwendolyn Gonzalez- she states the patient's Botox order is was shipped on 4/2/2019 and was delivered 4/3/2019 at 9:42 am signed by Daniel Rivera,    Please confirm that this Botox was received at the [facility] office and please document in this task        Thank you,    James Carballo

## 2019-04-09 ENCOUNTER — PROCEDURE VISIT (OUTPATIENT)
Dept: NEUROLOGY | Facility: CLINIC | Age: 39
End: 2019-04-09
Payer: COMMERCIAL

## 2019-04-09 VITALS
TEMPERATURE: 98.1 F | WEIGHT: 152 LBS | DIASTOLIC BLOOD PRESSURE: 78 MMHG | HEART RATE: 94 BPM | BODY MASS INDEX: 26.93 KG/M2 | SYSTOLIC BLOOD PRESSURE: 102 MMHG

## 2019-04-09 DIAGNOSIS — G43.709 CHRONIC MIGRAINE WITHOUT AURA WITHOUT STATUS MIGRAINOSUS, NOT INTRACTABLE: Primary | ICD-10-CM

## 2019-04-09 PROCEDURE — 64615 CHEMODENERV MUSC MIGRAINE: CPT | Performed by: PHYSICIAN ASSISTANT

## 2019-04-14 DIAGNOSIS — G43.701 CHRONIC MIGRAINE WITHOUT AURA WITH STATUS MIGRAINOSUS, NOT INTRACTABLE: ICD-10-CM

## 2019-04-15 RX ORDER — GABAPENTIN 100 MG/1
CAPSULE ORAL
Qty: 180 CAPSULE | Refills: 0 | Status: SHIPPED | OUTPATIENT
Start: 2019-04-15 | End: 2019-12-24 | Stop reason: SDUPTHER

## 2019-04-25 DIAGNOSIS — G43.709 CHRONIC MIGRAINE WITHOUT AURA WITHOUT STATUS MIGRAINOSUS, NOT INTRACTABLE: ICD-10-CM

## 2019-04-26 RX ORDER — TIZANIDINE HYDROCHLORIDE 2 MG/1
2 CAPSULE, GELATIN COATED ORAL AS NEEDED
Qty: 90 CAPSULE | Refills: 1 | Status: SHIPPED | OUTPATIENT
Start: 2019-04-26 | End: 2019-10-18 | Stop reason: SDUPTHER

## 2019-05-21 ENCOUNTER — TELEPHONE (OUTPATIENT)
Dept: NEUROLOGY | Facility: CLINIC | Age: 39
End: 2019-05-21

## 2019-05-24 ENCOUNTER — TELEPHONE (OUTPATIENT)
Dept: NEUROLOGY | Facility: CLINIC | Age: 39
End: 2019-05-24

## 2019-05-24 DIAGNOSIS — G43.709 CHRONIC MIGRAINE WITHOUT AURA WITHOUT STATUS MIGRAINOSUS, NOT INTRACTABLE: Primary | ICD-10-CM

## 2019-05-24 DIAGNOSIS — G43.701 CHRONIC MIGRAINE WITHOUT AURA WITH STATUS MIGRAINOSUS, NOT INTRACTABLE: ICD-10-CM

## 2019-05-24 RX ORDER — PROMETHAZINE HYDROCHLORIDE 12.5 MG/1
TABLET ORAL
Qty: 30 TABLET | Refills: 0 | Status: SHIPPED | OUTPATIENT
Start: 2019-05-24 | End: 2020-12-22 | Stop reason: ALTCHOICE

## 2019-05-24 RX ORDER — VITAMIN B COMPLEX
TABLET ORAL
Qty: 30 CAPSULE | Refills: 2 | Status: SHIPPED | OUTPATIENT
Start: 2019-05-24

## 2019-05-24 RX ORDER — SUMATRIPTAN 6 MG/.5ML
6 INJECTION, SOLUTION SUBCUTANEOUS ONCE
Qty: 2 ML | Refills: 0 | Status: SHIPPED | OUTPATIENT
Start: 2019-05-24 | End: 2019-06-07 | Stop reason: SDUPTHER

## 2019-05-24 RX ORDER — DEXAMETHASONE 2 MG/1
TABLET ORAL
Qty: 5 TABLET | Refills: 0 | Status: SHIPPED | OUTPATIENT
Start: 2019-05-24 | End: 2019-10-11 | Stop reason: ALTCHOICE

## 2019-06-07 ENCOUNTER — TELEPHONE (OUTPATIENT)
Dept: NEUROLOGY | Facility: CLINIC | Age: 39
End: 2019-06-07

## 2019-06-07 DIAGNOSIS — G43.709 CHRONIC MIGRAINE WITHOUT AURA WITHOUT STATUS MIGRAINOSUS, NOT INTRACTABLE: ICD-10-CM

## 2019-06-07 RX ORDER — SUMATRIPTAN 6 MG/.5ML
6 INJECTION, SOLUTION SUBCUTANEOUS ONCE
Qty: 2 ML | Refills: 0 | Status: SHIPPED | OUTPATIENT
Start: 2019-06-07 | End: 2019-06-27

## 2019-06-17 ENCOUNTER — TELEPHONE (OUTPATIENT)
Dept: NEUROLOGY | Facility: CLINIC | Age: 39
End: 2019-06-17

## 2019-06-27 ENCOUNTER — OFFICE VISIT (OUTPATIENT)
Dept: NEUROLOGY | Facility: CLINIC | Age: 39
End: 2019-06-27
Payer: COMMERCIAL

## 2019-06-27 VITALS
WEIGHT: 146 LBS | SYSTOLIC BLOOD PRESSURE: 116 MMHG | DIASTOLIC BLOOD PRESSURE: 80 MMHG | HEART RATE: 94 BPM | BODY MASS INDEX: 25.86 KG/M2

## 2019-06-27 DIAGNOSIS — M79.18 MYOFASCIAL PAIN SYNDROME, CERVICAL: ICD-10-CM

## 2019-06-27 DIAGNOSIS — G43.709 CHRONIC MIGRAINE WITHOUT AURA WITHOUT STATUS MIGRAINOSUS, NOT INTRACTABLE: Primary | ICD-10-CM

## 2019-06-27 PROCEDURE — 99215 OFFICE O/P EST HI 40 MIN: CPT | Performed by: PHYSICIAN ASSISTANT

## 2019-07-08 ENCOUNTER — PROCEDURE VISIT (OUTPATIENT)
Dept: NEUROLOGY | Facility: CLINIC | Age: 39
End: 2019-07-08
Payer: COMMERCIAL

## 2019-07-08 VITALS — TEMPERATURE: 97.5 F | HEART RATE: 83 BPM | SYSTOLIC BLOOD PRESSURE: 118 MMHG | DIASTOLIC BLOOD PRESSURE: 78 MMHG

## 2019-07-08 DIAGNOSIS — M79.18 MYOFASCIAL PAIN SYNDROME, CERVICAL: ICD-10-CM

## 2019-07-08 DIAGNOSIS — G43.709 CHRONIC MIGRAINE WITHOUT AURA WITHOUT STATUS MIGRAINOSUS, NOT INTRACTABLE: Primary | ICD-10-CM

## 2019-07-08 PROCEDURE — 64615 CHEMODENERV MUSC MIGRAINE: CPT | Performed by: PHYSICIAN ASSISTANT

## 2019-07-08 NOTE — PROGRESS NOTES
Chemodenervation  Date/Time: 7/8/2019 10:38 AM  Performed by: Tammie Brower PA-C  Authorized by: Tammie Brower PA-C     Pre-procedure details:     Prepped With: Alcohol    Procedure details:     Position:  Upright  Botox:     Botox Type:  Type A    Brand:  Botox    mL's of Botulinum Toxin:  185    Final Concentration per CC:  50 units    Needle Gauge:  30 G 2 5 inch  Procedures:     Botox Procedures: chronic headache      Indications: migraines    Injection Location:     Head / Face:  L , R , L frontalis, R frontalis, R inferior cervical paraspinal, L inferior cervical paraspinal, L medial occipitalis, R medial occipitalis, L lateral occipitalis, R lateral occipitalis, procerus, L temporalis, R temporalis, R superior trapezius and L superior trapezius    L  injection amount:  5 unit(s)    R  injection amount:  5 unit(s)    L lateral frontalis:  5 unit(s)    R lateral frontalis:  5 unit(s)    L medial frontalis:  5 unit(s)    R medial frontalis:  5 unit(s)    L temporalis injection amount:  20 unit(s)    R temporalis injection amount:  20 unit(s)    Procerus injection amount:  5 unit(s)    L lateral occipitalis injection amount:  5 unit(s)    R lateral occipitalis injection amount:  5 unit(s)    L medial occipitalis injection amount:  10 unit(s)    R medial occipitalis injection amount:  10 unit(s)    L inferior cervical paraspinal injection amount:  10 unit(s)    R inferior cervical paraspinal injection amount:  10 unit(s)    L superior trapezius injection amount:  5 unit(s)    R superior trapezius injection amount:  5 unit(s)    Comments:  Decreased units in the traps per pt request  Total Units:     Total units used:  185    Total units discarded:  15  Post-procedure details:     Chemodenervation:  Chronic migraine    Facial Nerve Location[de-identified]  Bilateral facial nerve    Patient tolerance of procedure:   Tolerated well, no immediate complications  Comments:      Extra 30 units in the temporalis muscles, extra 20 units in the scalp, all medically necessary  Vitals:    07/08/19 1031   BP: 118/78   Pulse: 83   Temp: 97 5 °F (36 4 °C)       Starting PT soon for neck pain  Pt feels better since last seen

## 2019-07-09 DIAGNOSIS — G43.709 CHRONIC MIGRAINE WITHOUT AURA WITHOUT STATUS MIGRAINOSUS, NOT INTRACTABLE: ICD-10-CM

## 2019-07-09 RX ORDER — SUMATRIPTAN 6 MG/.5ML
6 INJECTION, SOLUTION SUBCUTANEOUS ONCE
Qty: 2 ML | Refills: 0 | Status: SHIPPED | OUTPATIENT
Start: 2019-07-09 | End: 2019-08-05 | Stop reason: SDUPTHER

## 2019-07-10 ENCOUNTER — EVALUATION (OUTPATIENT)
Dept: PHYSICAL THERAPY | Facility: CLINIC | Age: 39
End: 2019-07-10
Payer: COMMERCIAL

## 2019-07-10 DIAGNOSIS — G43.709 CHRONIC MIGRAINE WITHOUT AURA WITHOUT STATUS MIGRAINOSUS, NOT INTRACTABLE: Primary | ICD-10-CM

## 2019-07-10 DIAGNOSIS — M79.18 MYOFASCIAL PAIN SYNDROME, CERVICAL: ICD-10-CM

## 2019-07-10 PROCEDURE — 97162 PT EVAL MOD COMPLEX 30 MIN: CPT | Performed by: PHYSICAL THERAPIST

## 2019-07-10 PROCEDURE — 97110 THERAPEUTIC EXERCISES: CPT | Performed by: PHYSICAL THERAPIST

## 2019-07-10 NOTE — PROGRESS NOTES
PT Evaluation     Today's date: 7/10/2019  Patient name: Mercedes Barboza  : 1980  MRN: [id number]  Referring provider: Sania Tapia  Dx:   Encounter Diagnosis     ICD-10-CM    1  Chronic migraine without aura without status migrainosus, not intractable G43 709 Ambulatory referral to Physical Therapy   2  Myofascial pain syndrome, cervical M79 18 Ambulatory referral to Physical Therapy                  Assessment  Assessment details: Kellee Burdick presents to PT with CC of frequent migraine headaches  Results of eval indicate upper cross syndrome with weakness to mid /lower trapezius muscles, impaired upper cervical rotation along with overall impaired cervical AROM, and diminished intervertebral movement  Findings are characteristic of cervicogenic headaches which is likely contributing to head ache pain  This is resulting in difficulty with sustained sitting, lifting heavy objects, and participating in exercise to maintain health  Would benefit from skilled PT to address these deficits     Understanding of Dx/Px/POC: good   Prognosis: good    Goals  STGs (4 weeks)  Pt will be independent with comprehensive HEP   Pt will demonstrate at least 10 degree improvement in L cervical rotation   Pt will be able to assume correct posture w/o cues     LTG's (to be achieved by d/c)  Pt will be able to self manage sx's independently   Pt will report at least 50% reduction in frequency of headaches  Pt will demonstrate improved strength in mid and lower traps by at least 1 grade on MMT      Plan  Plan details: Initiate POC  Patient would benefit from: skilled physical therapy  Planned modality interventions: thermotherapy: hydrocollator packs  Planned therapy interventions: home exercise program, therapeutic activities, therapeutic exercise, stretching, strengthening, patient education, manual therapy, joint mobilization and neuromuscular re-education  Frequency: 2x week  Duration in weeks: 8  Plan of Care beginning date: 7/10/2019  Plan of Care expiration date: 10/30/2019  Treatment plan discussed with: patient        Subjective Evaluation    History of Present Illness  Mechanism of injury: Pt reports migraines for over 20 years  Also is starting to have pain at the base of her skull  States that her HA's can be triggered by trying to use her arms and upper back muscles  Tried using a gym recently and was unable to use any resistance machines due to HA's  States that recently she gets about 3 HA's per week  HA will last about 36 hours  Pain is usually located around eyes and in temples and is always unilateral  Takes medications for her migraines which do help  See's neurology for Migraine management  Gets botox injections in head and in cervical region which are helpful  Recently got less in her neck so she could better gauge how it is feeling  Reports difficulty with sustained sitting due to pain, unable to particpate in physical activity for recreation and maintaining health, lifting heavy objects  Is self employed, makes crafts  Pain  Current pain ratin  At best pain ratin  At worst pain rating: 10          Objective          Cervical Palpation: Not TTP at this time (recent botox injections)    Cervical Posture: upper cross syndrome, R shoulder rests higher than L    Upper Quarter Screen   Dermatomal: intact    Myotomal: intact 4+/5 strength    Reflexes: 2+ b/l    Cervical Range of Motion     Flexion 40    Extension 34     Left Right   Lateral Flexion 16 28   Rotation 52 72     MMTs:  Mid traps: 4/5 b/l  Lower traps: 3+/5 b/l    PAIVMs: Decreased mobility at C2 and C7, greater mobility at C6-4 with some soreness reported  Thoracic region with moderate mobility and no soreness          Upper cervical rotation: greater limitation to L      Precautions: None       Manual              STM, trigger point release             Cervical, thoracic mobs                                                      *Pt was educated on effects of posture and weakness on the cervical spine and headaches   Exercise Diary  7/10            Chin tucks  10x 5"            Doorway  stretch 30"x2            Scap squeeze 10x 5"            Towel snags             Prone series              TB rows, ext             Upper trap stretch                                                                                                                                                                                                       Modalities

## 2019-07-16 ENCOUNTER — APPOINTMENT (OUTPATIENT)
Dept: PHYSICAL THERAPY | Facility: CLINIC | Age: 39
End: 2019-07-16
Payer: COMMERCIAL

## 2019-08-05 DIAGNOSIS — G43.709 CHRONIC MIGRAINE WITHOUT AURA WITHOUT STATUS MIGRAINOSUS, NOT INTRACTABLE: ICD-10-CM

## 2019-08-05 RX ORDER — SUMATRIPTAN 6 MG/.5ML
6 INJECTION, SOLUTION SUBCUTANEOUS ONCE
Qty: 2 ML | Refills: 0 | Status: SHIPPED | OUTPATIENT
Start: 2019-08-05 | End: 2019-08-30 | Stop reason: SDUPTHER

## 2019-08-30 ENCOUNTER — CLINICAL SUPPORT (OUTPATIENT)
Dept: NEUROLOGY | Facility: CLINIC | Age: 39
End: 2019-08-30
Payer: COMMERCIAL

## 2019-08-30 ENCOUNTER — TELEPHONE (OUTPATIENT)
Dept: NEUROLOGY | Facility: CLINIC | Age: 39
End: 2019-08-30

## 2019-08-30 DIAGNOSIS — G43.709 CHRONIC MIGRAINE WITHOUT AURA WITHOUT STATUS MIGRAINOSUS, NOT INTRACTABLE: Primary | ICD-10-CM

## 2019-08-30 PROCEDURE — 96372 THER/PROPH/DIAG INJ SC/IM: CPT | Performed by: PSYCHIATRY & NEUROLOGY

## 2019-08-30 RX ORDER — KETOROLAC TROMETHAMINE 30 MG/ML
INJECTION, SOLUTION INTRAMUSCULAR; INTRAVENOUS
Qty: 4 ML | Refills: 0 | Status: SHIPPED | OUTPATIENT
Start: 2019-08-30 | End: 2020-12-22 | Stop reason: ALTCHOICE

## 2019-08-30 RX ORDER — SUMATRIPTAN 6 MG/.5ML
6 INJECTION, SOLUTION SUBCUTANEOUS ONCE
Qty: 2 ML | Refills: 0 | Status: SHIPPED | OUTPATIENT
Start: 2019-08-30 | End: 2019-09-24 | Stop reason: SDUPTHER

## 2019-08-30 RX ORDER — SUMATRIPTAN 6 MG/.5ML
6 INJECTION, SOLUTION SUBCUTANEOUS ONCE
Qty: 2 ML | Refills: 0 | Status: SHIPPED | OUTPATIENT
Start: 2019-08-30 | End: 2019-08-30 | Stop reason: SDUPTHER

## 2019-08-30 RX ORDER — KETOROLAC TROMETHAMINE 30 MG/ML
60 INJECTION, SOLUTION INTRAMUSCULAR; INTRAVENOUS ONCE
Status: COMPLETED | OUTPATIENT
Start: 2019-08-30 | End: 2019-08-30

## 2019-08-30 RX ORDER — KETOROLAC TROMETHAMINE 30 MG/ML
INJECTION, SOLUTION INTRAMUSCULAR; INTRAVENOUS
Qty: 4 ML | Refills: 0 | Status: SHIPPED | OUTPATIENT
Start: 2019-08-30 | End: 2019-08-30 | Stop reason: SDUPTHER

## 2019-08-30 RX ORDER — SYRINGE W-NEEDLE,DISPOSAB,3 ML 25GX5/8"
SYRINGE, EMPTY DISPOSABLE MISCELLANEOUS
Qty: 3 EACH | Refills: 2 | Status: SHIPPED | OUTPATIENT
Start: 2019-08-30 | End: 2019-08-30 | Stop reason: SDUPTHER

## 2019-08-30 RX ORDER — SYRINGE W-NEEDLE,DISPOSAB,3 ML 25GX5/8"
SYRINGE, EMPTY DISPOSABLE MISCELLANEOUS
Qty: 3 EACH | Refills: 2 | Status: SHIPPED | OUTPATIENT
Start: 2019-08-30

## 2019-08-30 RX ADMIN — KETOROLAC TROMETHAMINE 60 MG: 30 INJECTION, SOLUTION INTRAMUSCULAR; INTRAVENOUS at 13:03

## 2019-08-30 NOTE — PROGRESS NOTES
Patient came in with 7-10 pain today   Toradol 60 MG injection given in left  Gluteal  Patient done well  Headache patient

## 2019-08-30 NOTE — TELEPHONE ENCOUNTER
I called [facility] pharmacy and canceled that order  Sent to [facility]

## 2019-08-30 NOTE — TELEPHONE ENCOUNTER
pt's  called migraine for 4 days  took relpax on tuesday, went way but then returned on wednesday, took 2 sumatriptan injection on wednesday, this did not resolve completely  took another sumatriptan injection last night, did resolve but came back over night  currently 9/10   vomiting every 30-60 mins since wednesday  this goes away when headache resolves  he states that she usually goes to urgent care for toradol injection and this is effective  I spoke to Conrad Brumfield at [address] and pt can come in for toradol injection  She would like to go to [place]  I scheduled pt for 12pm  Please enter orders    He is also asking if they could get a script for toradol at home?     Please advise on toradol script

## 2019-09-12 ENCOUNTER — TELEPHONE (OUTPATIENT)
Dept: NEUROLOGY | Facility: CLINIC | Age: 39
End: 2019-09-12

## 2019-09-12 NOTE — TELEPHONE ENCOUNTER
Type Date User Summary Attachment   General 09/12/2019  4:26 PM Chan Andujar care coordination  -   Note    UPDATE:     Botox- authorization #: [id number]- 2nd visit- valid from 6/25/2019 until 6/25/2020   Please use [address]      Thank you,     Amie Rob

## 2019-09-12 NOTE — TELEPHONE ENCOUNTER
Karrie Franks,    Please call [address] [address] to initiate a refill      Thanks,    [facility]

## 2019-09-16 NOTE — TELEPHONE ENCOUNTER
[address] and spoke with rep Jose Nichols to initiate fill request for patient's Botox 200 unit SDV  Refill initiated and provided [place] office address  Check back in 2-3 days for status update

## 2019-09-18 NOTE — TELEPHONE ENCOUNTER
Rep oDnell Mobley from [address] called to schedule delivery  Scheduled delivery of Botox 200 unit SDV quantity of 1 to [facility] office for Thursday 9/19/19 priority overnight with signature required  Confirmation # [id number]       Please await this shipment and notify me if it does not arrive as scheduled, thank you

## 2019-09-18 NOTE — TELEPHONE ENCOUNTER
Spoke with rep Breanna Gutierrez from [facility] who stated order is ready but need patient's consent to ship  He did try to call patient 2 times but no answer, VM left  Spoke with patient and she is agreeable to call today after lunch time  Check back this afternoon to schedule

## 2019-09-24 DIAGNOSIS — G43.709 CHRONIC MIGRAINE WITHOUT AURA WITHOUT STATUS MIGRAINOSUS, NOT INTRACTABLE: ICD-10-CM

## 2019-09-24 RX ORDER — SUMATRIPTAN 6 MG/.5ML
6 INJECTION, SOLUTION SUBCUTANEOUS ONCE
Qty: 2 ML | Refills: 0 | Status: SHIPPED | OUTPATIENT
Start: 2019-09-24 | End: 2019-10-11 | Stop reason: SDUPTHER

## 2019-10-11 ENCOUNTER — PROCEDURE VISIT (OUTPATIENT)
Dept: NEUROLOGY | Facility: CLINIC | Age: 39
End: 2019-10-11
Payer: COMMERCIAL

## 2019-10-11 VITALS — DIASTOLIC BLOOD PRESSURE: 77 MMHG | HEART RATE: 94 BPM | TEMPERATURE: 98.4 F | SYSTOLIC BLOOD PRESSURE: 112 MMHG

## 2019-10-11 DIAGNOSIS — G43.709 CHRONIC MIGRAINE WITHOUT AURA WITHOUT STATUS MIGRAINOSUS, NOT INTRACTABLE: Primary | ICD-10-CM

## 2019-10-11 PROCEDURE — 64615 CHEMODENERV MUSC MIGRAINE: CPT | Performed by: PHYSICIAN ASSISTANT

## 2019-10-11 RX ORDER — AMITRIPTYLINE HYDROCHLORIDE 10 MG/1
TABLET, FILM COATED ORAL
Qty: 60 TABLET | Refills: 2 | Status: SHIPPED | OUTPATIENT
Start: 2019-10-11 | End: 2020-01-28

## 2019-10-11 RX ORDER — SUMATRIPTAN 100 MG/1
TABLET, FILM COATED ORAL
Qty: 9 TABLET | Refills: 0 | Status: SHIPPED | OUTPATIENT
Start: 2019-10-11 | End: 2019-11-23 | Stop reason: SDUPTHER

## 2019-10-11 NOTE — PROGRESS NOTES
Chemodenervation  Date/Time: 10/11/2019 10:00 AM  Performed by: Lorenzo Miller PA-C  Authorized by: Lorenzo Miller PA-C     Pre-procedure details:     Prepped With: Alcohol    Procedure details:     Position:  Upright  Botox:     Botox Type:  Type A    Brand:  Botox    mL's of Botulinum Toxin:  155    Final Concentration per CC:  50 units    Needle Gauge:  30 G 2 5 inch  Procedures:     Botox Procedures: chronic headache      Indications: migraines    Injection Location:     Head / Face:  L , R , L frontalis, R frontalis, R inferior cervical paraspinal, L inferior cervical paraspinal, L medial occipitalis, R medial occipitalis, L lateral occipitalis, R lateral occipitalis, procerus, L temporalis, R temporalis, R superior trapezius and L superior trapezius    L  injection amount:  5 unit(s)    R  injection amount:  5 unit(s)    L lateral frontalis:  5 unit(s)    R lateral frontalis:  5 unit(s)    L medial frontalis:  5 unit(s)    R medial frontalis:  5 unit(s)    L temporalis injection amount:  20 unit(s)    R temporalis injection amount:  20 unit(s)    Procerus injection amount:  5 unit(s)    L lateral occipitalis injection amount:  5 unit(s)    R lateral occipitalis injection amount:  5 unit(s)    L medial occipitalis injection amount:  10 unit(s)    R medial occipitalis injection amount:  10 unit(s)    L inferior cervical paraspinal injection amount:  5 unit(s)    R inferior cervical paraspinal injection amount:  5 unit(s)    L superior trapezius injection amount:  5 unit(s)    R superior trapezius injection amount:  5 unit(s)  Total Units:     Total units used:  155    Total units discarded:  45  Post-procedure details:     Chemodenervation:  Chronic migraine    Facial Nerve Location[de-identified]  Bilateral facial nerve    Patient tolerance of procedure: Tolerated well, no immediate complications  Comments:      Less injected into the CPs and traps, medically necessary    Extra 30 units in the temporal mm b/l, medically necessary  4cc saline: 200 units botox  Vitals:    10/11/19 0949   BP: 112/77   Pulse: 94   Temp: 98 4 °F (36 9 °C)     She is experiencing worsening migraine headaches, more frequent  Most times the Imitrex injectable works but the headache rebounds  She also gets some side effects with Imitrex injectable: mild jitteriness, slightly elevated heart rate  She will go back to Imitrex p o  She was agreeable to trying prevention medication amitriptyline  Side effects reviewed  She did not want to try Aimovig at this time

## 2019-10-18 DIAGNOSIS — G43.709 CHRONIC MIGRAINE WITHOUT AURA WITHOUT STATUS MIGRAINOSUS, NOT INTRACTABLE: ICD-10-CM

## 2019-10-18 RX ORDER — TIZANIDINE HYDROCHLORIDE 2 MG/1
2 CAPSULE, GELATIN COATED ORAL AS NEEDED
Qty: 90 CAPSULE | Refills: 1 | Status: SHIPPED | OUTPATIENT
Start: 2019-10-18 | End: 2020-11-06

## 2019-11-06 DIAGNOSIS — G43.709 CHRONIC MIGRAINE WITHOUT AURA WITHOUT STATUS MIGRAINOSUS, NOT INTRACTABLE: ICD-10-CM

## 2019-11-07 RX ORDER — SUMATRIPTAN 6 MG/.5ML
6 INJECTION, SOLUTION SUBCUTANEOUS ONCE
Qty: 2 ML | Refills: 0 | Status: SHIPPED | OUTPATIENT
Start: 2019-11-07 | End: 2019-12-24 | Stop reason: SDUPTHER

## 2019-11-23 DIAGNOSIS — G43.709 CHRONIC MIGRAINE WITHOUT AURA WITHOUT STATUS MIGRAINOSUS, NOT INTRACTABLE: ICD-10-CM

## 2019-11-25 RX ORDER — SUMATRIPTAN 100 MG/1
TABLET, FILM COATED ORAL
Qty: 9 TABLET | Refills: 0 | Status: SHIPPED | OUTPATIENT
Start: 2019-11-25 | End: 2019-12-24 | Stop reason: SDUPTHER

## 2019-12-24 DIAGNOSIS — G43.709 CHRONIC MIGRAINE WITHOUT AURA WITHOUT STATUS MIGRAINOSUS, NOT INTRACTABLE: ICD-10-CM

## 2019-12-24 DIAGNOSIS — G43.701 CHRONIC MIGRAINE WITHOUT AURA WITH STATUS MIGRAINOSUS, NOT INTRACTABLE: ICD-10-CM

## 2019-12-24 RX ORDER — SUMATRIPTAN 6 MG/.5ML
6 INJECTION, SOLUTION SUBCUTANEOUS ONCE
Qty: 2 ML | Refills: 0 | Status: SHIPPED | OUTPATIENT
Start: 2019-12-24 | End: 2020-03-02

## 2019-12-24 RX ORDER — SUMATRIPTAN 100 MG/1
TABLET, FILM COATED ORAL
Qty: 9 TABLET | Refills: 0 | Status: SHIPPED | OUTPATIENT
Start: 2019-12-24 | End: 2020-02-18

## 2019-12-24 RX ORDER — GABAPENTIN 100 MG/1
CAPSULE ORAL
Qty: 180 CAPSULE | Refills: 0 | Status: SHIPPED | OUTPATIENT
Start: 2019-12-24 | End: 2021-02-25

## 2019-12-27 ENCOUNTER — TELEPHONE (OUTPATIENT)
Dept: NEUROLOGY | Facility: CLINIC | Age: 39
End: 2019-12-27

## 2019-12-27 NOTE — TELEPHONE ENCOUNTER
Type Date User Summary Attachment   General 12/27/2019  1:47 PM Neisha Mckeon care coordination  -   Note    Botox- authorization #: [id number]- 3rd visit- valid from 6/25/2019 until 6/25/2020   Please use [place]      Thank you,     Janee Jarvis

## 2019-12-27 NOTE — TELEPHONE ENCOUNTER
[address]- spoke with Richard Wray- I made her aware I was calling to initiate a refill on the patient's Botox order  Refill request initiated  Botox order is ready to be scheduled for delivery  I did attempted to contact the patient while on the phone with [facility]- patient did not answer  Left a message for the patient to give our office a call back  When patient calls back please advise her to call [facility] to give consent to ship her Botox appointment  Phone number is listed below      Phone: [phone]

## 2019-12-27 NOTE — TELEPHONE ENCOUNTER
Patient called back and is aware that her Botox order is ready to be set up for delivery  I provided the patient with the phone number for [facility]  She will call to give consent to ship  Will follow-up on Monday

## 2019-12-30 NOTE — TELEPHONE ENCOUNTER
[address]- spoke with Min Chopra- I advised her I was calling to see if the patient gave consent to ship her order  She states she did not give her consent on yet  They did attempt to contact the patient on 12/27/19 and left a message- patient has not yet returned there call  She states she will attempt to contact her now while I am on the phone to see if we can get her consent on file  She states the patient did not answer she did not answer another message was left for the patient

## 2020-01-02 NOTE — TELEPHONE ENCOUNTER
Received a call from the patient- she advised me she called [facility] and gave permission to ship and took care of her co-pay for Botox  Patient states we will be receiving a fax to call and schedule the delivery  I advised her that I would call now to schedule delivery  [address]- spoke with Best Howell- she states the patient's Botox order is ready to be scheduled for delivery  Botox 200 units QTY: 1 is scheduled to be delivered on Tuesday 1/14/2020 to the [facility] location- a signature will be required  Brigida,    Please await Botox delivery and document once it has arrived  Please let me know if you do not receive the patient's Botox order      Thank you,    Miri Johnson

## 2020-01-14 NOTE — TELEPHONE ENCOUNTER
Botox number of units 200 U   Botox quantity: 1   Arrived at what location: [facility]   Lot number: [id number]

## 2020-01-17 ENCOUNTER — PROCEDURE VISIT (OUTPATIENT)
Dept: NEUROLOGY | Facility: CLINIC | Age: 40
End: 2020-01-17
Payer: COMMERCIAL

## 2020-01-17 VITALS — TEMPERATURE: 98.9 F | DIASTOLIC BLOOD PRESSURE: 73 MMHG | HEART RATE: 84 BPM | SYSTOLIC BLOOD PRESSURE: 102 MMHG

## 2020-01-17 DIAGNOSIS — G43.709 CHRONIC MIGRAINE WITHOUT AURA WITHOUT STATUS MIGRAINOSUS, NOT INTRACTABLE: Primary | ICD-10-CM

## 2020-01-17 PROCEDURE — 64615 CHEMODENERV MUSC MIGRAINE: CPT | Performed by: PHYSICIAN ASSISTANT

## 2020-01-17 NOTE — PROGRESS NOTES
Chemodenervation  Date/Time: 1/17/2020 8:45 AM  Performed by: Charu Neely PA-C  Authorized by: Charu Neely PA-C     Pre-procedure details:     Prepped With: Alcohol    Procedure details:     Position:  Upright  Botox:     Botox Type:  Type A    Brand:  Botox    mL's of Botulinum Toxin:  155    Final Concentration per CC:  50 units    Needle Gauge:  30 G 2 5 inch  Procedures:     Botox Procedures: chronic headache      Indications: migraines    Injection Location:     Head / Face:  L , R , L frontalis, R frontalis, R inferior cervical paraspinal, L inferior cervical paraspinal, L medial occipitalis, R medial occipitalis, L lateral occipitalis, R lateral occipitalis, procerus, L temporalis, R temporalis, R superior trapezius and L superior trapezius    L  injection amount:  5 unit(s)    R  injection amount:  5 unit(s)    L lateral frontalis:  5 unit(s)    R lateral frontalis:  5 unit(s)    L medial frontalis:  5 unit(s)    R medial frontalis:  5 unit(s)    L temporalis injection amount:  20 unit(s)    R temporalis injection amount:  20 unit(s)    Procerus injection amount:  5 unit(s)    L lateral occipitalis injection amount:  5 unit(s)    R lateral occipitalis injection amount:  5 unit(s)    L medial occipitalis injection amount:  10 unit(s)    R medial occipitalis injection amount:  10 unit(s)    L inferior cervical paraspinal injection amount:  5 unit(s)    R inferior cervical paraspinal injection amount:  5 unit(s)    Comments:  Reduced amount    L superior trapezius injection amount:  10 unit(s)    R superior trapezius injection amount:  10 unit(s)    Comments:  Reduced amount  Total Units:     Total units used:  155    Total units discarded:  45  Post-procedure details:     Chemodenervation:  Chronic migraine    Facial Nerve Location[de-identified]  Bilateral facial nerve    Patient tolerance of procedure:   Tolerated well, no immediate complications  Comments:      Extra 20 units in the temples b/l, medically necessary  Vitals:    01/17/20 0836   BP: 102/73   Pulse: 84   Temp: 98 9 °F (37 2 °C)     Pt instructions:  Cocktail- Imitrex + advil +/- 100 mg gabapentin + benadryl if going to sleep  If above ineffective please call, we could try decadron

## 2020-01-17 NOTE — PATIENT INSTRUCTIONS
Imitrex + advil/ aleve +/- gabapentin + benadryl if going to sleep  If above ineffective please call, we could try decadron

## 2020-01-28 DIAGNOSIS — G43.709 CHRONIC MIGRAINE WITHOUT AURA WITHOUT STATUS MIGRAINOSUS, NOT INTRACTABLE: ICD-10-CM

## 2020-01-28 RX ORDER — AMITRIPTYLINE HYDROCHLORIDE 10 MG/1
TABLET, FILM COATED ORAL
Qty: 60 TABLET | Refills: 0 | Status: SHIPPED | OUTPATIENT
Start: 2020-01-28 | End: 2020-02-03

## 2020-01-31 DIAGNOSIS — G43.709 CHRONIC MIGRAINE WITHOUT AURA WITHOUT STATUS MIGRAINOSUS, NOT INTRACTABLE: ICD-10-CM

## 2020-02-03 RX ORDER — AMITRIPTYLINE HYDROCHLORIDE 10 MG/1
TABLET, FILM COATED ORAL
Qty: 180 TABLET | Refills: 0 | Status: SHIPPED | OUTPATIENT
Start: 2020-02-03 | End: 2020-12-22 | Stop reason: ALTCHOICE

## 2020-02-18 DIAGNOSIS — G43.709 CHRONIC MIGRAINE WITHOUT AURA WITHOUT STATUS MIGRAINOSUS, NOT INTRACTABLE: ICD-10-CM

## 2020-02-18 RX ORDER — SUMATRIPTAN 100 MG/1
TABLET, FILM COATED ORAL
Qty: 9 TABLET | Refills: 0 | Status: SHIPPED | OUTPATIENT
Start: 2020-02-18 | End: 2020-04-17

## 2020-03-02 DIAGNOSIS — G43.709 CHRONIC MIGRAINE WITHOUT AURA WITHOUT STATUS MIGRAINOSUS, NOT INTRACTABLE: ICD-10-CM

## 2020-03-02 RX ORDER — SUMATRIPTAN 6 MG/.5ML
6 INJECTION, SOLUTION SUBCUTANEOUS ONCE
Qty: 2.5 ML | Refills: 0 | Status: SHIPPED | OUTPATIENT
Start: 2020-03-02 | End: 2020-08-04

## 2020-03-06 ENCOUNTER — TELEPHONE (OUTPATIENT)
Dept: NEUROLOGY | Facility: CLINIC | Age: 40
End: 2020-03-06

## 2020-03-06 NOTE — TELEPHONE ENCOUNTER
General 03/05/2020  3:16 PM Vahid Cuenca MA CARE COORDINATION -   Note    BOTOX 200 UNITS (LAST VISIT) - AUTH# [id number]  4 VISITS, AV- 06/25/2019 TILL 06/25/2020    [facility]

## 2020-03-09 NOTE — TELEPHONE ENCOUNTER
Called [facility] and spoke to [place] - she informed me that pt's account has been placed on hold due to high balance  Pt needs to call [facility] first and take care of the balance

## 2020-04-02 NOTE — TELEPHONE ENCOUNTER
Called patient- lmom awaiting a call back  When patient calls back can you please confirm if she has made a payment with [facility]  Will call the specialty pharmacy today as well to confirm this information

## 2020-04-02 NOTE — TELEPHONE ENCOUNTER
[address]- spoke with Librado Ivey- she states the patient's Botox order is still on hold due to the outstanding balance  Patient has not yet called in to take care of this  Per registration patient rescheduled her appointment for 6/29/2020 with Karuna Daly  Will address closer to her next appointment

## 2020-04-08 NOTE — TELEPHONE ENCOUNTER
Patient called back and advised me that she is going to hold off on having her Botox shipped out at this time  She states we can revisit this closer to her appointment in [place]

## 2020-04-16 DIAGNOSIS — G43.709 CHRONIC MIGRAINE WITHOUT AURA WITHOUT STATUS MIGRAINOSUS, NOT INTRACTABLE: ICD-10-CM

## 2020-04-17 RX ORDER — SUMATRIPTAN 100 MG/1
TABLET, FILM COATED ORAL
Qty: 9 TABLET | Refills: 0 | Status: SHIPPED | OUTPATIENT
Start: 2020-04-17 | End: 2020-05-14

## 2020-05-14 DIAGNOSIS — G43.709 CHRONIC MIGRAINE WITHOUT AURA WITHOUT STATUS MIGRAINOSUS, NOT INTRACTABLE: ICD-10-CM

## 2020-05-14 RX ORDER — SUMATRIPTAN 100 MG/1
TABLET, FILM COATED ORAL
Qty: 9 TABLET | Refills: 0 | Status: SHIPPED | OUTPATIENT
Start: 2020-05-14 | End: 2020-06-05

## 2020-06-05 DIAGNOSIS — G43.709 CHRONIC MIGRAINE WITHOUT AURA WITHOUT STATUS MIGRAINOSUS, NOT INTRACTABLE: ICD-10-CM

## 2020-06-05 RX ORDER — SUMATRIPTAN 100 MG/1
TABLET, FILM COATED ORAL
Qty: 9 TABLET | Refills: 0 | Status: SHIPPED | OUTPATIENT
Start: 2020-06-05 | End: 2020-07-02

## 2020-07-02 DIAGNOSIS — G43.709 CHRONIC MIGRAINE WITHOUT AURA WITHOUT STATUS MIGRAINOSUS, NOT INTRACTABLE: ICD-10-CM

## 2020-07-02 RX ORDER — SUMATRIPTAN 100 MG/1
TABLET, FILM COATED ORAL
Qty: 9 TABLET | Refills: 0 | Status: SHIPPED | OUTPATIENT
Start: 2020-07-02 | End: 2020-09-16 | Stop reason: SDUPTHER

## 2020-07-13 NOTE — TELEPHONE ENCOUNTER
Called and spoke to pt - I informed her that her order is on hold with the pharmacy due to her having a high balance and she previously discussed with James Vásquez  Per pt she will call the pharmacy today to pay her balance

## 2020-07-13 NOTE — TELEPHONE ENCOUNTER
I called [facility] and spoke to [facility] - she informed me that pt's Botox prescription has   I was transferred to the pharmacy where I spoke to Nellie Sanches- provided him with verbal order for Botox 100 Units Qty of 2 with 3 refills    Order marked as STAT  Kristina Serum Kristina Serum

## 2020-07-15 NOTE — TELEPHONE ENCOUNTER
Called [facility] and spoke to PA - I enquired if pt's account has been re-activated  He informed me the request put in earlier today to re-activate pt's account was denied  I asked for a reason for the denial, I was placed on hold while he reached out to the senior support team  When he came back on the line, he requested that I faxed in the approval letter to the pharmacy at [phone]  Prior auth approval letter faxed to the pharmacy

## 2020-07-15 NOTE — TELEPHONE ENCOUNTER
Called [facility] and spoke to Anabelle - he informed me that on 7/13/20 pharmacy insurance rep called [facility] Escam[facility] and spoke to No, whom informed [facility]'s rep that Botox is not covered under pt's plan  Per Anabelle, pt's account with [facility] has been inactivated  I was placed on hold while Anabelle tried to reach [facility] Escamilla, when he came back on the line he informed me that he was informed by [facility] Escamilla that they are currently working on prior auth for pt and it should be ready in 24 hours  Per Anabelle, he is sending a form to have the pt's account re-instated

## 2020-07-16 NOTE — TELEPHONE ENCOUNTER
Called [facility] and spoke to Gale 2 - she informed me that the pharmacy is still working on verifying the prescription

## 2020-07-16 NOTE — TELEPHONE ENCOUNTER
Called [facility] and spoke to Mini - she informed me that pt's account has been deactivated  I requested to speak to a supervisor, I was transferred to Adelina Quijano - she informed me that pt's account has been deactivated due to [address] the pharmacy that Botox is not covered under pt's plan  I informed her that rep Megan Mcneill with [employer] called [facility] while I was on the phone yesterday, and that he verified that pt has active coverage and prior auth on file  M[place] sent the request to have pt's account re-activated  Adelina Quijano stated that, since [facility] informed the pharmacy on 7/13/20 that Botox is not covered, the pharmacy will not provide services to pt unless someone from [facility] call them and confirm that Botox is covered    I called [facility] and spoke to Dylan - I informed her of my prevous conversation with [employer]  I called [facility] and spoke to Breanna on a 3-way call with Star  Star informed Breanna that pt has active coverage and that Botox is covered and pt has a valid approval on file  I provided Breanna with prior auth information  She stated that she will send a request to have pt's account re-instated and pt's benefits re-verified ASAP  Usman Rios

## 2020-07-16 NOTE — TELEPHONE ENCOUNTER
Called [facility] and spoke to [facility] - she informed me that the account has been re-activated and the benefits are being verified

## 2020-07-17 NOTE — TELEPHONE ENCOUNTER
Called [facility] and spoke to [facility] - Botox delivery confirmed for Tuesday 7/21/20 via [id number] air morning delivery to [facility] location [address]  Please await Botox delivery  Please use stock for patient on 7/20/20 and replace Botox once it arrives  Thank you!     Marta

## 2020-07-22 NOTE — TELEPHONE ENCOUNTER
Patient's next appointment with [facility] is 10/16/20  Looks like she canceled her previous appointment with [facility] on 7/20/20

## 2020-07-31 NOTE — TELEPHONE ENCOUNTER
Tried to contact patient to reschedule appointment for Botox - looks like Botox never arrived to the office

## 2020-08-03 NOTE — TELEPHONE ENCOUNTER
[address]- spoke with Marlene Wyman- I advised him I was calling because we never received the patient's Botox order on 7/21/20  He states the patient's Botox order was stopped from being shipped out  He states the patient called in stating she is feeling sick and will not be able to come into the office for a while  Patient canceled shipment  Cesar Arguello,    Patient canceled her Botox order from being delivered due to feeling sick and not being able to come in for a while  Once Botox appointment has been rescheduled will be able to call and re-start order  Please let me know if you get in contact with the patient      Thanks,    Viola Abreu

## 2020-08-04 DIAGNOSIS — G43.709 CHRONIC MIGRAINE WITHOUT AURA WITHOUT STATUS MIGRAINOSUS, NOT INTRACTABLE: ICD-10-CM

## 2020-08-04 RX ORDER — SUMATRIPTAN 6 MG/.5ML
6 INJECTION, SOLUTION SUBCUTANEOUS ONCE
Qty: 2.5 ML | Refills: 0 | Status: SHIPPED | OUTPATIENT
Start: 2020-08-04 | End: 2020-09-14

## 2020-08-04 NOTE — TELEPHONE ENCOUNTER
Susan Holloway,    Please note that patient canceled her appointment 7/20- still haven't reached the patient to reschedule the appointment  Please contact patient to reschedule once she is ready for injections and let the Botox Coordinators know to re-order Botox from specialty pharmacy  Let me know if you need further assistance      Devante Mora

## 2020-08-10 ENCOUNTER — TELEPHONE (OUTPATIENT)
Dept: NEUROLOGY | Facility: CLINIC | Age: 40
End: 2020-08-10

## 2020-08-10 NOTE — TELEPHONE ENCOUNTER
I called Dax Walker this morning, trying to re-schedule her Botox appt  Left her a message to call me back, she did not answer  Please forward call to me at [phone]  Thank you

## 2020-08-20 ENCOUNTER — TELEPHONE (OUTPATIENT)
Dept: NEUROLOGY | Facility: CLINIC | Age: 40
End: 2020-08-20

## 2020-08-20 NOTE — TELEPHONE ENCOUNTER
General  08/19/2020  3:08 PM  Lety Lundberg MA  CARE COORDINATION  -    Note     BOTOX 200 UNITS (VISIT# 2) - AUTH# [id number]  4 VISITS, AV- 07/15/2020 TILL 07/15/2021    [facility]

## 2020-09-02 NOTE — TELEPHONE ENCOUNTER
Called and left a vm for [facility] [phone] to release the hold on the account

## 2020-09-02 NOTE — TELEPHONE ENCOUNTER
Called [facility] and spoke to Tesfaye - she informed me that the patient called the pharmacy and placed the medication on hold  Patient must call pharmacy to release the hold before pharmacy can process order

## 2020-09-08 NOTE — TELEPHONE ENCOUNTER
Called and spoke to patient - she informed me that she will call the pharmacy by the end of the day today

## 2020-09-14 DIAGNOSIS — G43.709 CHRONIC MIGRAINE WITHOUT AURA WITHOUT STATUS MIGRAINOSUS, NOT INTRACTABLE: ICD-10-CM

## 2020-09-14 RX ORDER — SUMATRIPTAN 6 MG/.5ML
6 INJECTION, SOLUTION SUBCUTANEOUS ONCE
Qty: 2.5 ML | Refills: 0 | Status: SHIPPED | OUTPATIENT
Start: 2020-09-14 | End: 2020-10-23

## 2020-09-14 NOTE — TELEPHONE ENCOUNTER
Called and spoke to patient - she informed me that she called the pharmacy and she was told that there is an issue with her account  Per patient the pharmacy was supposed to call her back  Pt stated that she has not heard from the patient as of yet    patient stated that she will call the pharmacy today to follow up

## 2020-09-15 NOTE — TELEPHONE ENCOUNTER
Called [facility] and spoke to GENARO - she informed me that pt's order has been reprocessed and it's ready to ship  However pharmacy needs patient's consent to ship    Called and left a vm for [facility] [phone] to provide consent to ship

## 2020-09-16 DIAGNOSIS — G43.709 CHRONIC MIGRAINE WITHOUT AURA WITHOUT STATUS MIGRAINOSUS, NOT INTRACTABLE: ICD-10-CM

## 2020-09-16 RX ORDER — SUMATRIPTAN 100 MG/1
TABLET, FILM COATED ORAL
Qty: 9 TABLET | Refills: 0 | Status: SHIPPED | OUTPATIENT
Start: 2020-09-16 | End: 2020-10-13

## 2020-09-16 NOTE — TELEPHONE ENCOUNTER
Called [facility] and spoke to [place] - I informed her that I needed to update the delivery address for pt's Botox delivery   Botox delivery confirmed for Tuesday 9/22/20 via [employer] priority overnight to [place] location      Please await Botox delivery      Thank you!     Yahaira Perez

## 2020-09-16 NOTE — TELEPHONE ENCOUNTER
Called and left a vm for [facility] [phone] to provide consent to ship

## 2020-09-16 NOTE — TELEPHONE ENCOUNTER
Called [facility] and spoke to Ashly Gillespie - she informed me that the pt has not called the pharmacy as of yet to provide consent to ship

## 2020-09-16 NOTE — TELEPHONE ENCOUNTER
Called [facility] and spoke to [place] - Botox delivery confirmed for Tuesday 9/22/20 via [employer] priority overnight to [facility] location  Please await Botox delivery  Thank you!     Marta

## 2020-09-16 NOTE — TELEPHONE ENCOUNTER
Received a voicemail from the patient- she states she was able to call the pharmacy and provide consent to ship  She states that everything should be set to go  She states she was unable to make any type of payment as she owed nothing   She states if there are any other problems with her order please give her a call back    Phone: [phone]

## 2020-09-22 NOTE — TELEPHONE ENCOUNTER
Botox number of units: 100 units  Botox quantity: 2 vials  Arrived at what location: [address]  Lot number: [id number], [id number]  Expiration Date: 3/2023, 3/2023

## 2020-10-13 DIAGNOSIS — G43.709 CHRONIC MIGRAINE WITHOUT AURA WITHOUT STATUS MIGRAINOSUS, NOT INTRACTABLE: ICD-10-CM

## 2020-10-13 RX ORDER — SUMATRIPTAN 100 MG/1
TABLET, FILM COATED ORAL
Qty: 9 TABLET | Refills: 0 | Status: SHIPPED | OUTPATIENT
Start: 2020-10-13 | End: 2020-11-16

## 2020-10-16 ENCOUNTER — PROCEDURE VISIT (OUTPATIENT)
Dept: NEUROLOGY | Facility: CLINIC | Age: 40
End: 2020-10-16
Payer: COMMERCIAL

## 2020-10-16 VITALS
DIASTOLIC BLOOD PRESSURE: 78 MMHG | WEIGHT: 172 LBS | BODY MASS INDEX: 30.48 KG/M2 | SYSTOLIC BLOOD PRESSURE: 104 MMHG | HEART RATE: 81 BPM | TEMPERATURE: 97.5 F | HEIGHT: 63 IN

## 2020-10-16 DIAGNOSIS — G43.709 CHRONIC MIGRAINE WITHOUT AURA WITHOUT STATUS MIGRAINOSUS, NOT INTRACTABLE: Primary | ICD-10-CM

## 2020-10-16 PROCEDURE — 64615 CHEMODENERV MUSC MIGRAINE: CPT | Performed by: PHYSICIAN ASSISTANT

## 2020-10-22 DIAGNOSIS — G43.709 CHRONIC MIGRAINE WITHOUT AURA WITHOUT STATUS MIGRAINOSUS, NOT INTRACTABLE: ICD-10-CM

## 2020-10-23 RX ORDER — SUMATRIPTAN 6 MG/.5ML
6 INJECTION, SOLUTION SUBCUTANEOUS ONCE
Qty: 2.5 ML | Refills: 0 | Status: SHIPPED | OUTPATIENT
Start: 2020-10-23 | End: 2020-11-29

## 2020-11-06 DIAGNOSIS — G43.709 CHRONIC MIGRAINE WITHOUT AURA WITHOUT STATUS MIGRAINOSUS, NOT INTRACTABLE: ICD-10-CM

## 2020-11-06 RX ORDER — TIZANIDINE HYDROCHLORIDE 2 MG/1
2 CAPSULE, GELATIN COATED ORAL AS NEEDED
Qty: 90 CAPSULE | Refills: 1 | Status: SHIPPED | OUTPATIENT
Start: 2020-11-06 | End: 2021-05-12

## 2020-11-14 DIAGNOSIS — G43.709 CHRONIC MIGRAINE WITHOUT AURA WITHOUT STATUS MIGRAINOSUS, NOT INTRACTABLE: ICD-10-CM

## 2020-11-16 RX ORDER — SUMATRIPTAN 100 MG/1
TABLET, FILM COATED ORAL
Qty: 9 TABLET | Refills: 0 | Status: SHIPPED | OUTPATIENT
Start: 2020-11-16 | End: 2020-12-23

## 2020-11-25 ENCOUNTER — TELEPHONE (OUTPATIENT)
Dept: NEUROLOGY | Facility: CLINIC | Age: 40
End: 2020-11-25

## 2020-11-29 DIAGNOSIS — G43.709 CHRONIC MIGRAINE WITHOUT AURA WITHOUT STATUS MIGRAINOSUS, NOT INTRACTABLE: ICD-10-CM

## 2020-11-29 RX ORDER — SUMATRIPTAN 6 MG/.5ML
6 INJECTION, SOLUTION SUBCUTANEOUS ONCE
Qty: 2.5 ML | Refills: 0 | Status: SHIPPED | OUTPATIENT
Start: 2020-11-29 | End: 2020-12-23

## 2020-12-07 ENCOUNTER — APPOINTMENT (EMERGENCY)
Dept: ULTRASOUND IMAGING | Facility: HOSPITAL | Age: 40
End: 2020-12-07
Payer: COMMERCIAL

## 2020-12-07 ENCOUNTER — ANESTHESIA EVENT (EMERGENCY)
Dept: PERIOP | Facility: HOSPITAL | Age: 40
End: 2020-12-07
Payer: COMMERCIAL

## 2020-12-07 ENCOUNTER — APPOINTMENT (EMERGENCY)
Dept: CT IMAGING | Facility: HOSPITAL | Age: 40
End: 2020-12-07
Payer: COMMERCIAL

## 2020-12-07 ENCOUNTER — ANESTHESIA (EMERGENCY)
Dept: PERIOP | Facility: HOSPITAL | Age: 40
End: 2020-12-07
Payer: COMMERCIAL

## 2020-12-07 ENCOUNTER — HOSPITAL ENCOUNTER (EMERGENCY)
Facility: HOSPITAL | Age: 40
Discharge: HOME | End: 2020-12-07
Attending: EMERGENCY MEDICINE | Admitting: EMERGENCY MEDICINE
Payer: COMMERCIAL

## 2020-12-07 VITALS
BODY MASS INDEX: 30.74 KG/M2 | HEART RATE: 76 BPM | SYSTOLIC BLOOD PRESSURE: 103 MMHG | OXYGEN SATURATION: 99 % | HEIGHT: 63 IN | RESPIRATION RATE: 16 BRPM | WEIGHT: 173.5 LBS | DIASTOLIC BLOOD PRESSURE: 56 MMHG | TEMPERATURE: 97.5 F

## 2020-12-07 VITALS — HEART RATE: 80 BPM

## 2020-12-07 DIAGNOSIS — N83.209 OVARIAN CYST: ICD-10-CM

## 2020-12-07 DIAGNOSIS — N83.519 OVARIAN TORSION: Primary | ICD-10-CM

## 2020-12-07 LAB
ALBUMIN SERPL BCP-MCNC: 4.1 G/DL (ref 3.5–5)
ALP SERPL-CCNC: 64 U/L (ref 46–116)
ALT SERPL W P-5'-P-CCNC: 19 U/L (ref 12–78)
ANION GAP SERPL CALCULATED.3IONS-SCNC: 11 MMOL/L (ref 4–13)
AST SERPL W P-5'-P-CCNC: 8 U/L (ref 5–45)
BACTERIA UR QL AUTO: ABNORMAL /HPF
BASOPHILS # BLD AUTO: 0.05 THOUSANDS/ΜL (ref 0–0.1)
BASOPHILS NFR BLD AUTO: 1 % (ref 0–1)
BILIRUB SERPL-MCNC: 0.55 MG/DL (ref 0.2–1)
BILIRUB UR QL STRIP: NEGATIVE
BUN SERPL-MCNC: 8 MG/DL (ref 5–25)
CALCIUM SERPL-MCNC: 9.1 MG/DL (ref 8.3–10.1)
CHLORIDE SERPL-SCNC: 103 MMOL/L (ref 100–108)
CLARITY UR: CLEAR
CO2 SERPL-SCNC: 23 MMOL/L (ref 21–32)
COLOR UR: ABNORMAL
CREAT SERPL-MCNC: 0.76 MG/DL (ref 0.6–1.3)
EOSINOPHIL # BLD AUTO: 0.1 THOUSAND/ΜL (ref 0–0.61)
EOSINOPHIL NFR BLD AUTO: 1 % (ref 0–6)
ERYTHROCYTE [DISTWIDTH] IN BLOOD BY AUTOMATED COUNT: 12.5 % (ref 11.6–15.1)
EXT PREG TEST URINE: NEGATIVE
EXT. CONTROL ED NAV: NORMAL
GFR SERPL CREATININE-BSD FRML MDRD: 100 ML/MIN/1.73SQ M
GLUCOSE SERPL-MCNC: 100 MG/DL (ref 65–140)
GLUCOSE UR STRIP-MCNC: NEGATIVE MG/DL
HCT VFR BLD AUTO: 39.5 % (ref 34.8–46.1)
HGB BLD-MCNC: 13.1 G/DL (ref 11.5–15.4)
HGB UR QL STRIP.AUTO: ABNORMAL
IMM GRANULOCYTES # BLD AUTO: 0.02 THOUSAND/UL (ref 0–0.2)
IMM GRANULOCYTES NFR BLD AUTO: 0 % (ref 0–2)
KETONES UR STRIP-MCNC: NEGATIVE MG/DL
LEUKOCYTE ESTERASE UR QL STRIP: NEGATIVE
LIPASE SERPL-CCNC: 95 U/L (ref 73–393)
LYMPHOCYTES # BLD AUTO: 1.69 THOUSANDS/ΜL (ref 0.6–4.47)
LYMPHOCYTES NFR BLD AUTO: 21 % (ref 14–44)
MCH RBC QN AUTO: 30.9 PG (ref 26.8–34.3)
MCHC RBC AUTO-ENTMCNC: 33.2 G/DL (ref 31.4–37.4)
MCV RBC AUTO: 93 FL (ref 82–98)
MONOCYTES # BLD AUTO: 0.58 THOUSAND/ΜL (ref 0.17–1.22)
MONOCYTES NFR BLD AUTO: 7 % (ref 4–12)
NEUTROPHILS # BLD AUTO: 5.7 THOUSANDS/ΜL (ref 1.85–7.62)
NEUTS SEG NFR BLD AUTO: 70 % (ref 43–75)
NITRITE UR QL STRIP: NEGATIVE
NON-SQ EPI CELLS URNS QL MICRO: ABNORMAL /HPF
NRBC BLD AUTO-RTO: 0 /100 WBCS
PH UR STRIP.AUTO: 5.5 [PH] (ref 4.5–8)
PLATELET # BLD AUTO: 276 THOUSANDS/UL (ref 149–390)
PMV BLD AUTO: 11.8 FL (ref 8.9–12.7)
POTASSIUM SERPL-SCNC: 3.9 MMOL/L (ref 3.5–5.3)
PROT SERPL-MCNC: 8.1 G/DL (ref 6.4–8.2)
PROT UR STRIP-MCNC: ABNORMAL MG/DL
RBC # BLD AUTO: 4.24 MILLION/UL (ref 3.81–5.12)
RBC #/AREA URNS AUTO: ABNORMAL /HPF
SODIUM SERPL-SCNC: 137 MMOL/L (ref 136–145)
SP GR UR STRIP.AUTO: >=1.03 (ref 1–1.03)
UROBILINOGEN UR QL STRIP.AUTO: 0.2 E.U./DL
WBC # BLD AUTO: 8.14 THOUSAND/UL (ref 4.31–10.16)
WBC #/AREA URNS AUTO: ABNORMAL /HPF

## 2020-12-07 PROCEDURE — 36415 COLL VENOUS BLD VENIPUNCTURE: CPT | Performed by: PHYSICIAN ASSISTANT

## 2020-12-07 PROCEDURE — 80053 COMPREHEN METABOLIC PANEL: CPT | Performed by: PHYSICIAN ASSISTANT

## 2020-12-07 PROCEDURE — 99285 EMERGENCY DEPT VISIT HI MDM: CPT | Performed by: PHYSICIAN ASSISTANT

## 2020-12-07 PROCEDURE — 96361 HYDRATE IV INFUSION ADD-ON: CPT

## 2020-12-07 PROCEDURE — 58661 LAPAROSCOPY REMOVE ADNEXA: CPT | Performed by: OBSTETRICS & GYNECOLOGY

## 2020-12-07 PROCEDURE — 99285 EMERGENCY DEPT VISIT HI MDM: CPT

## 2020-12-07 PROCEDURE — 83690 ASSAY OF LIPASE: CPT | Performed by: PHYSICIAN ASSISTANT

## 2020-12-07 PROCEDURE — 76830 TRANSVAGINAL US NON-OB: CPT

## 2020-12-07 PROCEDURE — G1004 CDSM NDSC: HCPCS

## 2020-12-07 PROCEDURE — 74177 CT ABD & PELVIS W/CONTRAST: CPT

## 2020-12-07 PROCEDURE — 88305 TISSUE EXAM BY PATHOLOGIST: CPT | Performed by: PATHOLOGY

## 2020-12-07 PROCEDURE — 96374 THER/PROPH/DIAG INJ IV PUSH: CPT

## 2020-12-07 PROCEDURE — 81001 URINALYSIS AUTO W/SCOPE: CPT

## 2020-12-07 PROCEDURE — 85025 COMPLETE CBC W/AUTO DIFF WBC: CPT | Performed by: PHYSICIAN ASSISTANT

## 2020-12-07 PROCEDURE — 81025 URINE PREGNANCY TEST: CPT | Performed by: EMERGENCY MEDICINE

## 2020-12-07 PROCEDURE — 99213 OFFICE O/P EST LOW 20 MIN: CPT | Performed by: OBSTETRICS & GYNECOLOGY

## 2020-12-07 PROCEDURE — 76856 US EXAM PELVIC COMPLETE: CPT

## 2020-12-07 RX ORDER — OXYCODONE HYDROCHLORIDE 5 MG/1
5 TABLET ORAL EVERY 4 HOURS PRN
Status: DISCONTINUED | OUTPATIENT
Start: 2020-12-07 | End: 2020-12-07 | Stop reason: HOSPADM

## 2020-12-07 RX ORDER — SODIUM CHLORIDE, SODIUM LACTATE, POTASSIUM CHLORIDE, CALCIUM CHLORIDE 600; 310; 30; 20 MG/100ML; MG/100ML; MG/100ML; MG/100ML
125 INJECTION, SOLUTION INTRAVENOUS CONTINUOUS
Status: CANCELLED | OUTPATIENT
Start: 2020-12-07

## 2020-12-07 RX ORDER — METOCLOPRAMIDE HYDROCHLORIDE 5 MG/ML
10 INJECTION INTRAMUSCULAR; INTRAVENOUS ONCE AS NEEDED
Status: DISCONTINUED | OUTPATIENT
Start: 2020-12-07 | End: 2020-12-07 | Stop reason: HOSPADM

## 2020-12-07 RX ORDER — HYDROMORPHONE HCL/PF 1 MG/ML
0.5 SYRINGE (ML) INJECTION
Status: DISCONTINUED | OUTPATIENT
Start: 2020-12-07 | End: 2020-12-07 | Stop reason: HOSPADM

## 2020-12-07 RX ORDER — KETOROLAC TROMETHAMINE 30 MG/ML
INJECTION, SOLUTION INTRAMUSCULAR; INTRAVENOUS AS NEEDED
Status: DISCONTINUED | OUTPATIENT
Start: 2020-12-07 | End: 2020-12-07

## 2020-12-07 RX ORDER — ONDANSETRON 2 MG/ML
4 INJECTION INTRAMUSCULAR; INTRAVENOUS EVERY 6 HOURS PRN
Status: DISCONTINUED | OUTPATIENT
Start: 2020-12-07 | End: 2020-12-07 | Stop reason: HOSPADM

## 2020-12-07 RX ORDER — NEOSTIGMINE METHYLSULFATE 1 MG/ML
INJECTION INTRAVENOUS AS NEEDED
Status: DISCONTINUED | OUTPATIENT
Start: 2020-12-07 | End: 2020-12-07

## 2020-12-07 RX ORDER — HYDROMORPHONE HCL/PF 1 MG/ML
SYRINGE (ML) INJECTION AS NEEDED
Status: DISCONTINUED | OUTPATIENT
Start: 2020-12-07 | End: 2020-12-07

## 2020-12-07 RX ORDER — LIDOCAINE HYDROCHLORIDE 10 MG/ML
INJECTION, SOLUTION EPIDURAL; INFILTRATION; INTRACAUDAL; PERINEURAL AS NEEDED
Status: DISCONTINUED | OUTPATIENT
Start: 2020-12-07 | End: 2020-12-07

## 2020-12-07 RX ORDER — IBUPROFEN 600 MG/1
600 TABLET ORAL EVERY 6 HOURS PRN
Qty: 30 TABLET | Refills: 0 | Status: SHIPPED | OUTPATIENT
Start: 2020-12-07 | End: 2022-01-11 | Stop reason: SDUPTHER

## 2020-12-07 RX ORDER — SENNOSIDES 8.6 MG
650 CAPSULE ORAL EVERY 6 HOURS PRN
Qty: 30 TABLET | Refills: 0 | Status: CANCELLED
Start: 2020-12-07

## 2020-12-07 RX ORDER — OXYCODONE HYDROCHLORIDE AND ACETAMINOPHEN 5; 325 MG/1; MG/1
1 TABLET ORAL EVERY 4 HOURS PRN
Qty: 10 TABLET | Refills: 0 | Status: SHIPPED | OUTPATIENT
Start: 2020-12-07 | End: 2020-12-17

## 2020-12-07 RX ORDER — OXYCODONE HYDROCHLORIDE 5 MG/1
10 TABLET ORAL EVERY 4 HOURS PRN
Status: DISCONTINUED | OUTPATIENT
Start: 2020-12-07 | End: 2020-12-07 | Stop reason: HOSPADM

## 2020-12-07 RX ORDER — ALBUTEROL SULFATE 2.5 MG/3ML
2.5 SOLUTION RESPIRATORY (INHALATION) ONCE AS NEEDED
Status: DISCONTINUED | OUTPATIENT
Start: 2020-12-07 | End: 2020-12-07 | Stop reason: HOSPADM

## 2020-12-07 RX ORDER — LABETALOL 20 MG/4 ML (5 MG/ML) INTRAVENOUS SYRINGE
10
Status: DISCONTINUED | OUTPATIENT
Start: 2020-12-07 | End: 2020-12-07 | Stop reason: HOSPADM

## 2020-12-07 RX ORDER — BUPIVACAINE HYDROCHLORIDE 2.5 MG/ML
INJECTION, SOLUTION EPIDURAL; INFILTRATION; INTRACAUDAL AS NEEDED
Status: DISCONTINUED | OUTPATIENT
Start: 2020-12-07 | End: 2020-12-07 | Stop reason: HOSPADM

## 2020-12-07 RX ORDER — MIDAZOLAM HYDROCHLORIDE 2 MG/2ML
INJECTION, SOLUTION INTRAMUSCULAR; INTRAVENOUS AS NEEDED
Status: DISCONTINUED | OUTPATIENT
Start: 2020-12-07 | End: 2020-12-07

## 2020-12-07 RX ORDER — DEXAMETHASONE SODIUM PHOSPHATE 4 MG/ML
INJECTION, SOLUTION INTRA-ARTICULAR; INTRALESIONAL; INTRAMUSCULAR; INTRAVENOUS; SOFT TISSUE AS NEEDED
Status: DISCONTINUED | OUTPATIENT
Start: 2020-12-07 | End: 2020-12-07

## 2020-12-07 RX ORDER — KETOROLAC TROMETHAMINE 30 MG/ML
15 INJECTION, SOLUTION INTRAMUSCULAR; INTRAVENOUS ONCE
Status: COMPLETED | OUTPATIENT
Start: 2020-12-07 | End: 2020-12-07

## 2020-12-07 RX ORDER — ONDANSETRON 2 MG/ML
INJECTION INTRAMUSCULAR; INTRAVENOUS AS NEEDED
Status: DISCONTINUED | OUTPATIENT
Start: 2020-12-07 | End: 2020-12-07

## 2020-12-07 RX ORDER — PROPOFOL 10 MG/ML
INJECTION, EMULSION INTRAVENOUS CONTINUOUS PRN
Status: DISCONTINUED | OUTPATIENT
Start: 2020-12-07 | End: 2020-12-07

## 2020-12-07 RX ORDER — PROPOFOL 10 MG/ML
INJECTION, EMULSION INTRAVENOUS AS NEEDED
Status: DISCONTINUED | OUTPATIENT
Start: 2020-12-07 | End: 2020-12-07

## 2020-12-07 RX ORDER — IBUPROFEN 600 MG/1
600 TABLET ORAL EVERY 6 HOURS PRN
Status: DISCONTINUED | OUTPATIENT
Start: 2020-12-07 | End: 2020-12-07 | Stop reason: HOSPADM

## 2020-12-07 RX ORDER — MAGNESIUM HYDROXIDE 1200 MG/15ML
LIQUID ORAL AS NEEDED
Status: DISCONTINUED | OUTPATIENT
Start: 2020-12-07 | End: 2020-12-07 | Stop reason: HOSPADM

## 2020-12-07 RX ORDER — ROCURONIUM BROMIDE 10 MG/ML
INJECTION, SOLUTION INTRAVENOUS AS NEEDED
Status: DISCONTINUED | OUTPATIENT
Start: 2020-12-07 | End: 2020-12-07

## 2020-12-07 RX ORDER — SODIUM CHLORIDE, SODIUM LACTATE, POTASSIUM CHLORIDE, CALCIUM CHLORIDE 600; 310; 30; 20 MG/100ML; MG/100ML; MG/100ML; MG/100ML
INJECTION, SOLUTION INTRAVENOUS CONTINUOUS PRN
Status: DISCONTINUED | OUTPATIENT
Start: 2020-12-07 | End: 2020-12-07

## 2020-12-07 RX ORDER — DIPHENHYDRAMINE HYDROCHLORIDE 50 MG/ML
12.5 INJECTION INTRAMUSCULAR; INTRAVENOUS ONCE AS NEEDED
Status: DISCONTINUED | OUTPATIENT
Start: 2020-12-07 | End: 2020-12-07 | Stop reason: HOSPADM

## 2020-12-07 RX ORDER — HYDRALAZINE HYDROCHLORIDE 20 MG/ML
5 INJECTION INTRAMUSCULAR; INTRAVENOUS
Status: DISCONTINUED | OUTPATIENT
Start: 2020-12-07 | End: 2020-12-07 | Stop reason: HOSPADM

## 2020-12-07 RX ORDER — FENTANYL CITRATE 50 UG/ML
INJECTION, SOLUTION INTRAMUSCULAR; INTRAVENOUS AS NEEDED
Status: DISCONTINUED | OUTPATIENT
Start: 2020-12-07 | End: 2020-12-07

## 2020-12-07 RX ORDER — FENTANYL CITRATE/PF 50 MCG/ML
25 SYRINGE (ML) INJECTION
Status: DISCONTINUED | OUTPATIENT
Start: 2020-12-07 | End: 2020-12-07 | Stop reason: HOSPADM

## 2020-12-07 RX ORDER — ACETAMINOPHEN 325 MG/1
650 TABLET ORAL EVERY 6 HOURS PRN
Status: DISCONTINUED | OUTPATIENT
Start: 2020-12-07 | End: 2020-12-07 | Stop reason: HOSPADM

## 2020-12-07 RX ORDER — GLYCOPYRROLATE 0.2 MG/ML
INJECTION INTRAMUSCULAR; INTRAVENOUS AS NEEDED
Status: DISCONTINUED | OUTPATIENT
Start: 2020-12-07 | End: 2020-12-07

## 2020-12-07 RX ORDER — SODIUM CHLORIDE, SODIUM LACTATE, POTASSIUM CHLORIDE, CALCIUM CHLORIDE 600; 310; 30; 20 MG/100ML; MG/100ML; MG/100ML; MG/100ML
100 INJECTION, SOLUTION INTRAVENOUS CONTINUOUS
Status: DISCONTINUED | OUTPATIENT
Start: 2020-12-07 | End: 2020-12-07 | Stop reason: HOSPADM

## 2020-12-07 RX ORDER — HYDROMORPHONE HCL/PF 1 MG/ML
0.2 SYRINGE (ML) INJECTION
Status: DISCONTINUED | OUTPATIENT
Start: 2020-12-07 | End: 2020-12-07 | Stop reason: HOSPADM

## 2020-12-07 RX ADMIN — MIDAZOLAM HYDROCHLORIDE 2 MG: 1 INJECTION, SOLUTION INTRAMUSCULAR; INTRAVENOUS at 17:23

## 2020-12-07 RX ADMIN — SODIUM CHLORIDE, SODIUM LACTATE, POTASSIUM CHLORIDE, AND CALCIUM CHLORIDE: .6; .31; .03; .02 INJECTION, SOLUTION INTRAVENOUS at 17:24

## 2020-12-07 RX ADMIN — IOHEXOL 100 ML: 350 INJECTION, SOLUTION INTRAVENOUS at 11:57

## 2020-12-07 RX ADMIN — SODIUM CHLORIDE 1000 ML: 0.9 INJECTION, SOLUTION INTRAVENOUS at 10:51

## 2020-12-07 RX ADMIN — ROCURONIUM BROMIDE 50 MG: 10 SOLUTION INTRAVENOUS at 17:26

## 2020-12-07 RX ADMIN — OXYCODONE HYDROCHLORIDE 5 MG: 5 TABLET ORAL at 19:42

## 2020-12-07 RX ADMIN — ONDANSETRON 4 MG: 2 INJECTION INTRAMUSCULAR; INTRAVENOUS at 17:27

## 2020-12-07 RX ADMIN — LIDOCAINE HYDROCHLORIDE 50 MG: 10 INJECTION, SOLUTION EPIDURAL; INFILTRATION; INTRACAUDAL at 17:26

## 2020-12-07 RX ADMIN — FENTANYL CITRATE 25 MCG: 50 INJECTION INTRAMUSCULAR; INTRAVENOUS at 19:18

## 2020-12-07 RX ADMIN — DEXAMETHASONE SODIUM PHOSPHATE 4 MG: 4 INJECTION, SOLUTION INTRAMUSCULAR; INTRAVENOUS at 17:27

## 2020-12-07 RX ADMIN — NEOSTIGMINE METHYLSULFATE 3 MG: 1 INJECTION INTRAVENOUS at 18:35

## 2020-12-07 RX ADMIN — HYDROMORPHONE HYDROCHLORIDE 0.5 MG: 1 INJECTION, SOLUTION INTRAMUSCULAR; INTRAVENOUS; SUBCUTANEOUS at 18:29

## 2020-12-07 RX ADMIN — PROPOFOL 40 MCG/KG/MIN: 10 INJECTION, EMULSION INTRAVENOUS at 17:27

## 2020-12-07 RX ADMIN — FENTANYL CITRATE 100 MCG: 50 INJECTION, SOLUTION INTRAMUSCULAR; INTRAVENOUS at 17:26

## 2020-12-07 RX ADMIN — GLYCOPYRROLATE 0.4 MG: 0.2 INJECTION, SOLUTION INTRAMUSCULAR; INTRAVENOUS at 18:35

## 2020-12-07 RX ADMIN — KETOROLAC TROMETHAMINE 15 MG: 30 INJECTION, SOLUTION INTRAMUSCULAR at 14:39

## 2020-12-07 RX ADMIN — PROPOFOL 160 MG: 10 INJECTION, EMULSION INTRAVENOUS at 17:26

## 2020-12-07 RX ADMIN — KETOROLAC TROMETHAMINE 30 MG: 30 INJECTION, SOLUTION INTRAMUSCULAR at 18:33

## 2020-12-22 ENCOUNTER — OFFICE VISIT (OUTPATIENT)
Dept: OBGYN CLINIC | Facility: CLINIC | Age: 40
End: 2020-12-22

## 2020-12-22 VITALS
WEIGHT: 178 LBS | SYSTOLIC BLOOD PRESSURE: 128 MMHG | HEIGHT: 64 IN | DIASTOLIC BLOOD PRESSURE: 68 MMHG | BODY MASS INDEX: 30.39 KG/M2

## 2020-12-22 DIAGNOSIS — Z09 POSTOPERATIVE EXAMINATION: Primary | ICD-10-CM

## 2020-12-22 DIAGNOSIS — G43.709 CHRONIC MIGRAINE WITHOUT AURA WITHOUT STATUS MIGRAINOSUS, NOT INTRACTABLE: ICD-10-CM

## 2020-12-22 PROBLEM — N92.4 EXCESSIVE BLEEDING IN PREMENOPAUSAL PERIOD: Status: RESOLVED | Noted: 2017-11-09 | Resolved: 2020-12-22

## 2020-12-22 PROBLEM — N83.519 OVARIAN TORSION: Status: RESOLVED | Noted: 2020-12-07 | Resolved: 2020-12-22

## 2020-12-22 PROCEDURE — 99024 POSTOP FOLLOW-UP VISIT: CPT | Performed by: OBSTETRICS & GYNECOLOGY

## 2020-12-23 RX ORDER — SUMATRIPTAN 6 MG/.5ML
6 INJECTION, SOLUTION SUBCUTANEOUS ONCE
Qty: 2 ML | Refills: 0 | Status: SHIPPED | OUTPATIENT
Start: 2020-12-23 | End: 2021-06-08

## 2020-12-23 RX ORDER — SUMATRIPTAN 100 MG/1
TABLET, FILM COATED ORAL
Qty: 9 TABLET | Refills: 0 | Status: SHIPPED | OUTPATIENT
Start: 2020-12-23 | End: 2021-05-12

## 2020-12-31 ENCOUNTER — TELEPHONE (OUTPATIENT)
Dept: NEUROLOGY | Facility: CLINIC | Age: 40
End: 2020-12-31

## 2021-01-04 ENCOUNTER — TELEPHONE (OUTPATIENT)
Dept: NEUROLOGY | Facility: CLINIC | Age: 41
End: 2021-01-04

## 2021-01-04 NOTE — TELEPHONE ENCOUNTER
LMOM to complete registration and review covid screening questions for upcoming appointment 1/5/2021 9:30AM [facility] at [facility], please transfer to [phone]  ADVISE OF NO SHOW FEE  Please make aware of mask and visitor policy

## 2021-01-05 ENCOUNTER — TELEPHONE (OUTPATIENT)
Dept: NEUROLOGY | Facility: CLINIC | Age: 41
End: 2021-01-05

## 2021-01-05 NOTE — TELEPHONE ENCOUNTER
Patient returned call and she is going to keep her 1/15/2021 8:15AM [facility] [place]  Visit was converted from botox appt to an OVL for botox prior authorization

## 2021-01-05 NOTE — TELEPHONE ENCOUNTER
LMOM for patient to return call to reschedule her appt 1/5/2021 9:30AM [address] at [facility] to 1/19/2021 9:30AM [address] at [facility] per DI due to provider out of the office today

## 2021-01-11 ENCOUNTER — OFFICE VISIT (OUTPATIENT)
Dept: FAMILY MEDICINE CLINIC | Facility: CLINIC | Age: 41
End: 2021-01-11
Payer: COMMERCIAL

## 2021-01-11 VITALS
SYSTOLIC BLOOD PRESSURE: 130 MMHG | HEIGHT: 64 IN | RESPIRATION RATE: 16 BRPM | HEART RATE: 98 BPM | OXYGEN SATURATION: 99 % | BODY MASS INDEX: 31.07 KG/M2 | DIASTOLIC BLOOD PRESSURE: 84 MMHG | WEIGHT: 182 LBS

## 2021-01-11 DIAGNOSIS — Z82.79 FAMILY HISTORY OF FIRST DEGREE RELATIVE WITH BICUSPID AORTIC VALVE: ICD-10-CM

## 2021-01-11 DIAGNOSIS — R73.01 ELEVATED FASTING BLOOD SUGAR: ICD-10-CM

## 2021-01-11 DIAGNOSIS — Z13.220 SCREENING, LIPID: ICD-10-CM

## 2021-01-11 DIAGNOSIS — Z13.29 SCREENING FOR THYROID DISORDER: ICD-10-CM

## 2021-01-11 DIAGNOSIS — R01.1 CARDIAC MURMUR: ICD-10-CM

## 2021-01-11 DIAGNOSIS — Z00.00 ANNUAL PHYSICAL EXAM: ICD-10-CM

## 2021-01-11 DIAGNOSIS — Z00.00 PREVENTATIVE HEALTH CARE: Primary | ICD-10-CM

## 2021-01-11 PROCEDURE — 3725F SCREEN DEPRESSION PERFORMED: CPT | Performed by: FAMILY MEDICINE

## 2021-01-11 PROCEDURE — 99385 PREV VISIT NEW AGE 18-39: CPT | Performed by: FAMILY MEDICINE

## 2021-01-11 NOTE — PROGRESS NOTES
ADULT ANNUAL [address]  [facility]    NAME: Eric Garcia  AGE: 47 y o  SEX: female  : 1980     DATE: 2021     Assessment and Plan:     Problem List Items Addressed This Visit        Other    Preventative health care - Primary     UTD  Declined influenza, tetanus vaccine         Relevant Orders    Lipid panel    Hemoglobin A1C    TSH, 3rd generation with Free T4 reflex    Family history of first degree relative with bicuspid aortic valve     Needs ECHO         Relevant Orders    Echo complete with contrast if indicated      Other Visit Diagnoses     Screening for thyroid disorder        Relevant Orders    TSH, 3rd generation with Free T4 reflex    Screening, lipid        Relevant Orders    Lipid panel    Elevated fasting blood sugar        Relevant Orders    Hemoglobin A1C    Cardiac murmur        Relevant Orders    Echo complete with contrast if indicated    BMI 31 0-31 9,adult            BMI Counseling: Body mass index is 31 73 kg/m²  The BMI is above normal  Nutrition recommendations include reducing portion sizes, decreasing overall calorie intake and 3-5 servings of fruits/vegetables daily  Exercise recommendations include moderate aerobic physical activity for 150 minutes/week  Immunizations and preventive care screenings were discussed with patient today  Appropriate education was printed on patient's after visit summary  Counseling:  Dental Health: discussed importance of regular tooth brushing, flossing, and dental visits  · Exercise: the importance of regular exercise/physical activity was discussed  Recommend exercise 3-5 times per week for at least 30 minutes  No follow-ups on file       Chief Complaint:     Chief Complaint   Patient presents with    Annual Exam    Establish Care      History of Present Illness:     Adult Annual Physical   Patient here for a comprehensive physical exam  The patient reports no problems  Diet and Physical Activity  · Diet/Nutrition: well balanced diet  · Exercise: moderate cardiovascular exercise  Depression Screening  PHQ-9 Depression Screening    PHQ-9:   Frequency of the following problems over the past two weeks:      Little interest or pleasure in doing things: 0 - not at all  Feeling down, depressed, or hopeless: 0 - not at all  PHQ-2 Score: 0       General Health  · Sleep: sleeps well  · Hearing: normal - bilateral   · Vision: no vision problems  · Dental: regular dental visits  /GYN Health  · Last menstrual period: REGULAR       Review of Systems:     Review of Systems   Constitutional: Negative  HENT: Negative  Eyes: Negative  Respiratory: Negative  Cardiovascular: Negative  Gastrointestinal: Negative  Endocrine: Negative  Genitourinary: Negative  Musculoskeletal: Negative  Skin: Negative  Neurological: Negative  Psychiatric/Behavioral: Negative         Past Medical History:     Past Medical History:   Diagnosis Date    Abnormal Pap smear of cervix     Back pain     Irregular heart beat     Palpitations    Menorrhagia     Migraines       Past Surgical History:     Past Surgical History:   Procedure Laterality Date    OOPHORECTOMY Left 12/7/2020    Procedure: SALPINGO-OOPHORECTOMY, LAPAROSCOPIC;  Surgeon: Emma Hobson DO;  [facility];  Service: Gynecology    OVARIAN CYST SURGERY      CT HYSTEROSCOPY,W/ENDO BX N/A 11/9/2017    Procedure: DILATATION AND CURETTAGE (D&C) WITH HYSTEROSCOPY;  Surgeon: Kay Dunlap DO;  [facility];  Service: Gynecology    CT HYSTEROSCOPY,W/ENDOMETRIAL ABLATION N/A 11/9/2017    Procedure: Jaguar Mcmanus;  Surgeon: Kay Dunlap DO;  [facility];  Service: Gynecology    CT LAP,DIAGNOSTIC ABDOMEN N/A 12/7/2020    Procedure: LAPAROSCOPY DIAGNOSTIC;  Surgeon: Emma Hobson DO;  [facility];  Service: Gynecology    [place] TOOTH EXTRACTION        Social History:     E-Cigarette/Vaping    E-Cigarette Use Never User      E-Cigarette/Vaping Substances    Nicotine No     THC No     CBD No     Flavoring No     Other No     Unknown No      Social History     Socioeconomic History    Marital status: /Civil Union     Spouse name: None    Number of children: None    Years of education: None    Highest education level: None   Occupational History    None   Social Needs    Financial resource strain: None    Food insecurity     Worry: None     Inability: None    Transportation needs     Medical: None     Non-medical: None   Tobacco Use    Smoking status: Never Smoker    Smokeless tobacco: Never Used   Substance and Sexual Activity    Alcohol use: No    Drug use: Never    Sexual activity: Yes     Partners: Male     Birth control/protection: None   Lifestyle    Physical activity     Days per week: None     Minutes per session: None    Stress: None   Relationships    Social connections     Talks on phone: None     Gets together: None     Attends Zoroastrian service: None     Active member of club or organization: None     Attends meetings of clubs or organizations: None     Relationship status: None    Intimate partner violence     Fear of current or ex partner: None     Emotionally abused: None     Physically abused: None     Forced sexual activity: None   Other Topics Concern    None   Social History Narrative    None      Family History:     Family History   Problem Relation Age of Onset    No Known Problems Mother     No Known Problems Father     No Known Problems Brother     No Known Problems Daughter     No Known Problems Son     Dementia Maternal Grandmother     Heart disease Maternal Grandmother     Diabetes Maternal Grandmother     No Known Problems Maternal Grandfather     No Known Problems Paternal Grandmother     Heart disease Paternal Grandfather       Current Medications:     Current Outpatient Medications   Medication Sig Dispense Refill    Coenzyme Q10 (COQ10) 100 MG CAPS 1 cap qd  30 capsule 2    gabapentin (NEURONTIN) 100 mg capsule TAKE 2 CAPSULES BY MOUTH EVERY DAY AT BEDTIME 180 capsule 0    ibuprofen (MOTRIN) 600 mg tablet Take 1 tablet (600 mg total) by mouth every 6 (six) hours as needed for mild pain or moderate pain 30 tablet 0    SUMAtriptan (IMITREX) 100 mg tablet TAKE ONE TAB AT MIGRAINE ONSET, REPEAT AFTER 2 HR IF NEEDED  NO MORE THAN 2/24 HOURS OR 3 PER WEEK  9 tablet 0    Syringe/Needle, Disp, (SYRINGE 3CC/90EZ5-5/4") 27G X 1-1/4" 3 ML MISC Use for Toradol intramuscular injections  3 each 2    TiZANidine (ZANAFLEX) 2 MG capsule TAKE 1 CAPSULE (2 MG TOTAL) BY MOUTH AS NEEDED FOR MUSCLE SPASMS 90 capsule 1    SUMAtriptan (IMITREX) 6 mg/0 5 mL INJECT 0 5 ML (6 MG TOTAL) UNDER THE SKIN ONCE FOR 1 DOSE 2 mL 0     No current facility-administered medications for this visit  Allergies:     No Known Allergies   Physical Exam:     /84   Pulse 98   Resp 16   Ht 5' 3 5" (1 613 m)   Wt 82 6 kg (182 lb)   SpO2 99%   BMI 31 73 kg/m²     Physical Exam  Vitals signs and nursing note reviewed  HENT:      Right Ear: External ear normal       Left Ear: External ear normal    Eyes:      Conjunctiva/sclera: Conjunctivae normal    Neck:      Musculoskeletal: Normal range of motion and neck supple  Cardiovascular:      Rate and Rhythm: Normal rate and regular rhythm  Heart sounds: Normal heart sounds  Pulmonary:      Effort: Pulmonary effort is normal       Breath sounds: Normal breath sounds  Abdominal:      General: Bowel sounds are normal       Palpations: Abdomen is soft  Musculoskeletal: Normal range of motion  Skin:     General: Skin is warm  Neurological:      Mental Status: She is alert and oriented to person, place, and time  Psychiatric:         Behavior: Behavior normal          Thought Content:  Thought content normal          Judgment: Judgment normal  Deward Hodgkins, MD   [place] [id number]

## 2021-01-11 NOTE — PATIENT INSTRUCTIONS

## 2021-01-12 ENCOUNTER — TELEPHONE (OUTPATIENT)
Dept: FAMILY MEDICINE CLINIC | Facility: CLINIC | Age: 41
End: 2021-01-12

## 2021-01-12 DIAGNOSIS — R01.0 BENIGN HEART MURMUR: Primary | ICD-10-CM

## 2021-01-12 DIAGNOSIS — R01.1 MURMUR: Primary | ICD-10-CM

## 2021-01-12 NOTE — TELEPHONE ENCOUNTER
PATIENT'S  CALLED IN, HE CHECKED WITH HIS INSURANCE COMPANY TO SEE IF THE LAB WORK WOULD BE COVERED    THEY TOLD HIM THAT ALL BUT THE LIPID PANEL WERE FINE  THE LIPID PANEL IS COMING UP AS NEEDING A PRIOR APPROVAL, BE IF IT IS CODED DIFFERENTLY THEN IT SHOULD BE FINE  THEY DID NOT GIVE HIM A CODE TO USE  PLEASE CALL HIM BACK

## 2021-01-15 ENCOUNTER — OFFICE VISIT (OUTPATIENT)
Dept: NEUROLOGY | Facility: CLINIC | Age: 41
End: 2021-01-15
Payer: COMMERCIAL

## 2021-01-15 VITALS
HEIGHT: 63 IN | SYSTOLIC BLOOD PRESSURE: 111 MMHG | HEART RATE: 93 BPM | BODY MASS INDEX: 32.07 KG/M2 | WEIGHT: 181 LBS | TEMPERATURE: 99.2 F | DIASTOLIC BLOOD PRESSURE: 75 MMHG

## 2021-01-15 DIAGNOSIS — G43.709 CHRONIC MIGRAINE WITHOUT AURA WITHOUT STATUS MIGRAINOSUS, NOT INTRACTABLE: Primary | ICD-10-CM

## 2021-01-15 DIAGNOSIS — G43.829 MENSTRUAL MIGRAINE WITHOUT STATUS MIGRAINOSUS, NOT INTRACTABLE: ICD-10-CM

## 2021-01-15 DIAGNOSIS — G43.709 CHRONIC MIGRAINE WITHOUT AURA WITHOUT STATUS MIGRAINOSUS, NOT INTRACTABLE: ICD-10-CM

## 2021-01-15 DIAGNOSIS — M79.18 MYOFASCIAL PAIN SYNDROME, CERVICAL: ICD-10-CM

## 2021-01-15 PROCEDURE — 99214 OFFICE O/P EST MOD 30 MIN: CPT | Performed by: PHYSICIAN ASSISTANT

## 2021-01-15 PROCEDURE — 3008F BODY MASS INDEX DOCD: CPT | Performed by: PHYSICIAN ASSISTANT

## 2021-01-15 PROCEDURE — 1036F TOBACCO NON-USER: CPT | Performed by: PHYSICIAN ASSISTANT

## 2021-01-15 RX ORDER — SUMATRIPTAN 100 MG/1
TABLET, FILM COATED ORAL
Qty: 9 TABLET | Refills: 0 | OUTPATIENT
Start: 2021-01-15

## 2021-01-15 RX ORDER — DIHYDROERGOTAMINE MESYLATE 4 MG/ML
SPRAY NASAL
Qty: 4 ML | Refills: 2 | Status: SHIPPED | OUTPATIENT
Start: 2021-01-15 | End: 2021-05-12 | Stop reason: SINTOL

## 2021-01-15 RX ORDER — KETOROLAC TROMETHAMINE 10 MG/1
10 TABLET, FILM COATED ORAL EVERY 6 HOURS PRN
Qty: 10 TABLET | Refills: 0 | Status: SHIPPED | OUTPATIENT
Start: 2021-01-15 | End: 2021-09-27 | Stop reason: ALTCHOICE

## 2021-01-15 NOTE — PROGRESS NOTES
Patient ID: Fani Gould is a 47 y o  female  Assessment/Plan:     Diagnoses and all orders for this visit:    Chronic migraine without aura without status migrainosus, not intractable  -     dihydroergotamine (MIGRANAL) 4 MG/ML nasal spray; One spray in one nostril, repeat aafter 30 minutes if needed  No more than 4 doses per month  Hold imitrex  -     ketorolac (TORADOL) 10 mg tablet; Take 1 tablet (10 mg total) by mouth every 6 (six) hours as needed (migraine) Max 2-3 per week  Hold advil    Menstrual migraine without status migrainosus, not intractable  -     dihydroergotamine (MIGRANAL) 4 MG/ML nasal spray; One spray in one nostril, repeat aafter 30 minutes if needed  No more than 4 doses per month  Hold imitrex    Myofascial pain syndrome, cervical       As noted below menstrual migraines are worse and are not responding to Imitrex injectable as they did before  Will take an Imitrex injectable holiday for few months, and substitute with DHE  She is to spray in 1 nostril, and repeat after 30 minutes if this is ineffective in the other nostril  She can do a total of 4 sprays in 1 day considering she does not have any side effects  Side effects such as tachycardia, Etc  Are reviewed with her  She can add Toradol to that  She will continue Imitrex p o  +/- Toradol for lower grade migraine throughout the month that is not a menstrual related migraine  She should use no more than 4-5 doses of Imitrex p o  With the DHE on board  She understands not to use DHE and Imitrex in the same day  Consider indocin if toradol fails  However she sometimes has difficulty eating during severe migriane  Could consider carafate  Continue botox q90 days  Continue gabapentin and tizanidine p r n  For neck pain  Hold ibuprofen with the Toradol  Re: neck pain, consider PT if needed  The patient should not hesitate to call me prior to her follow up with any questions or concerns      Subjective:    HPI Ms Victor M Zavala is a pleasant 46 yo female here for neurological f/u for chronic migraines and neck pain/ tension      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, self-employed  She has 2 children  She continues to report significant reduction of migraines with Botox injections  She denies side effects to Botox  When she stopped Botox for 3 or more months the migraines worsened significantly  Since starting botox, the patient reports greater than 7 days of migraine relief from baseline, correlated with headache diary, decreased abortive medication use and decreased ER visits  She continues to have severe migraine headaches which are associated with her menstrual cycle  They occur once per month and last 3-4 days  She sometimes has blurry vision prior to the headache starting, but no focal deficits  No visual obscurations  She is dysfunctional during the headache because of the pain  Was is been going on for quite some time  On new year's Day she went to urgent care due to menstrual migraine and was given a Toradol shot but it was ineffective  Prior to that she took sumatriptan injectable which typically helps but this even was not helpful  Lately she feels sumatriptan injectable has been ineffective  For low-grade headaches or low-grade migraines at other times in the month she takes Imitrex p o  Which helps  She sometimes adds Advil, gabapentin and/or Benadryl where needed  Prior note : We recommended "Cocktail- Imitrex + advil +/- 100 mg gabapentin + benadryl if going to sleep  If the above is ineffective, could try decadron or toradol  I asked the pt to consider Emgality for prevention  She will consider  Since imitrex 1/2 life is short, she will consider DHE for menstrual migraines   Sometimes imitrex wears off quickly "    Prior HPI:  Average pain level 6/10, up to 8-9/10  Over the past few months since last botox injection she has been getting more migraines and she thinks possibly associated with neck tension, 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      Headaches usually more severe in the morning, she gets a morning headache at least 3 times per week since last seen and she thinks it is because of her increased neck tension     Quality- pounding, dull, sharp  Location- either R or L temple, either R or L supraorbital, somtimes radiating into the mid frontal between the eyes  Associated with: nausea, emesis, photophobia and phonophobia, blurred vision, difficulty with focusing when looking at light i e  Computer, neck stiffness, sore neck, sometimes dizziness     Triggers: weather changes, menstrual cycle (biggest trigger), stress/ tension, bright lights/ fluorescent lighting     Meds taken for headaches:  Prevention: Gabapentin (100 mg qhs, higher dose causes s/e), Coq10, Magnesium (nausea sometimes), Riboflavin, Tizanidine, Cyclobenzaprine, Verapamil, venlafaxine-never tried worried about side effects, Botox, tizanidine- not helpful  Abortive: Imitrex - works intermittently, Aleve, Replax- effective, Fioricet, Tramadol, Promethazine, compazine, frova, olanzapine, maxalt, Toradol, decadron- ineffective, imitrex inj  Other tx: PT for neck, chiropractor- triggered migraines    States she is very sensitive to meds, some cause nausea      Neck tension and HAs are sometimes better towards end of day  She can sometimes get rid of a headache at the onset when she goes into a certain yoga position where she curls/ flexes the entire spine while the laying on the ground and flexes the neck with the forehead on the ground  Sometimes the migraines evolves anyway      States cervical and trap mm pain worsens with using arm weights in the gym, 10 lbs or more  Reports left shoulder pain  Denies sensory changes, weakness, falls, imbalance, incontinence      Did PT in the past which was helpful      The following portions of the patient's history were reviewed and updated as appropriate: She  has a past medical history of Abnormal Pap smear of cervix, Back pain, Irregular heart beat, Menorrhagia, and Migraines  She   Patient Active Problem List    Diagnosis Date Noted    Menstrual migraine without status migrainosus, not intractable 01/15/2021    Murmur 01/12/2021    Preventative health care 01/11/2021    Family history of first degree relative with bicuspid aortic valve 01/11/2021    Myofascial pain syndrome, cervical 06/27/2019    Chronic migraine without aura without status migrainosus, not intractable 03/19/2018    Headache 01/09/2014     She  has a past surgical history that includes [place] tooth extraction; Ovarian cyst surgery; pr hysteroscopy,w/endometrial ablation (N/A, 11/9/2017); pr hysteroscopy,w/endo bx (N/A, 11/9/2017); pr lap,diagnostic abdomen (N/A, 12/7/2020); and Oophorectomy (Left, 12/7/2020)  Her family history includes Dementia in her maternal grandmother; Diabetes in her maternal grandmother; Heart disease in her maternal grandmother and paternal grandfather; No Known Problems in her brother, daughter, father, maternal grandfather, mother, paternal grandmother, and son  She  reports that she has never smoked  She has never used smokeless tobacco  She reports that she does not drink alcohol or use drugs  Current Outpatient Medications   Medication Sig Dispense Refill    Coenzyme Q10 (COQ10) 100 MG CAPS 1 cap qd  30 capsule 2    gabapentin (NEURONTIN) 100 mg capsule TAKE 2 CAPSULES BY MOUTH EVERY DAY AT BEDTIME 180 capsule 0    ibuprofen (MOTRIN) 600 mg tablet Take 1 tablet (600 mg total) by mouth every 6 (six) hours as needed for mild pain or moderate pain 30 tablet 0    SUMAtriptan (IMITREX) 100 mg tablet TAKE ONE TAB AT MIGRAINE ONSET, REPEAT AFTER 2 HR IF NEEDED  NO MORE THAN 2/24 HOURS OR 3 PER WEEK  9 tablet 0    Syringe/Needle, Disp, (SYRINGE 3CC/01DO4-5/4") 27G X 1-1/4" 3 ML MISC Use for Toradol intramuscular injections   3 each 2    TiZANidine (ZANAFLEX) 2 MG capsule TAKE 1 CAPSULE (2 MG TOTAL) BY MOUTH AS NEEDED FOR MUSCLE SPASMS 90 capsule 1    dihydroergotamine (MIGRANAL) 4 MG/ML nasal spray One spray in one nostril, repeat aafter 30 minutes if needed  No more than 4 doses per month  Hold imitrex 4 mL 2    ketorolac (TORADOL) 10 mg tablet Take 1 tablet (10 mg total) by mouth every 6 (six) hours as needed (migraine) Max 2-3 per week  Hold advil 10 tablet 0    SUMAtriptan (IMITREX) 6 mg/0 5 mL INJECT 0 5 ML (6 MG TOTAL) UNDER THE SKIN ONCE FOR 1 DOSE 2 mL 0     No current facility-administered medications for this visit  She has No Known Allergies            Objective:    Blood pressure 111/75, pulse 93, temperature 99 2 °F (37 3 °C), temperature source Tympanic, height 5' 3" (1 6 m), weight 82 1 kg (181 lb), not currently breastfeeding  Body mass index is 32 06 kg/m²  Physical Exam    Neurological Exam  Vital signs reviewed  Well developed, well nourished  Head: Normocephalic, atraumatic  CN 5-26: intact and symmetric, including EOMs which are normal b/l and PERRL  Fundi b/l are normal to crude ophthalmological examination  MSK: 5/5 t/o  ROM normal x all 4 extr  Reflexes: 2+ and symmetric in all 4 extr  Coordination: Nml x4 extr  Gait: Steady normal gait  ROS:    Review of Systems   Constitutional: Negative  Negative for appetite change and fever  HENT: Negative  Negative for hearing loss, tinnitus, trouble swallowing and voice change  Eyes: Negative  Negative for photophobia and pain  Respiratory: Negative  Negative for shortness of breath  Cardiovascular: Negative  Negative for palpitations  Gastrointestinal: Negative  Negative for nausea and vomiting  Endocrine: Negative  Negative for cold intolerance  Genitourinary: Negative  Negative for dysuria, frequency and urgency  Musculoskeletal: Negative  Negative for myalgias and neck pain  Skin: Negative  Negative for rash     Neurological: Positive for headaches  Negative for dizziness, tremors, seizures, syncope, facial asymmetry, speech difficulty, weakness, light-headedness and numbness  Patient stated that she has headaches 4 or 5 a month  Hematological: Negative  Does not bruise/bleed easily  Psychiatric/Behavioral: Negative  Negative for confusion, hallucinations and sleep disturbance  The following portions of the patient's history were reviewed and updated as appropriate: allergies, current medications/ medication history, past family history, past medical history, past social history, past surgical history and problem list     Review of systems was reviewed and otherwise unremarkable from a neurological perspective  I have spent 25 minutes with the patient today in which greater than 50% of this time was spent in counseling/coordination of care regarding diagnoses, test results, impression, plan and potential medication side effects

## 2021-01-15 NOTE — PATIENT INSTRUCTIONS
DHE- do not take with imitrex  Hold imitrex indefinitely  Add toradol if DHE ineffective after 30-60 minutes

## 2021-01-21 ENCOUNTER — TELEPHONE (OUTPATIENT)
Dept: NEUROLOGY | Facility: CLINIC | Age: 41
End: 2021-01-21

## 2021-01-21 NOTE — TELEPHONE ENCOUNTER
LMOM for patient to call office back to schedule an appointment with [address] for [place]

## 2021-01-22 ENCOUNTER — TELEPHONE (OUTPATIENT)
Dept: NEUROLOGY | Facility: CLINIC | Age: 41
End: 2021-01-22

## 2021-01-22 NOTE — TELEPHONE ENCOUNTER
Called placed to patient, patient requesting a sooner appt for her Botox with [address] due to migraines  Patient states her Botox can be shipped overnight  Explained to patient I can inquire about getting her Botox delivered sooner, so she can have a sooner appt  Vicki please advise

## 2021-01-22 NOTE — TELEPHONE ENCOUNTER
Botox is scheduled for delivery Tuesday 1/26/2021 to the [facility] location via [employer]  Bryant Ignacio, please await botox delivery and document in this encounter once received  Please advise if botox is not received by 2pm     Thanks!   Rupert Ferrell

## 2021-01-22 NOTE — TELEPHONE ENCOUNTER
Called patient to schedule her Botox appt  Patient scheduled for 2/3/21 at the \[facility]\"" office with [address]

## 2021-01-22 NOTE — TELEPHONE ENCOUNTER
Type Date User Summary Attachment   General 01/22/2021  2:19 PM Nancy Moyer care coordination -   Note    Botox- authorization # [id number] valid dates 01/18/2021 - 01/18/2022   Please use [facility]      Thanks  Cecilia Saldivar

## 2021-01-22 NOTE — TELEPHONE ENCOUNTER
Patient scheduled 2/3/2021 with [facility] in the [address] [facility] location

## 2021-01-22 NOTE — TELEPHONE ENCOUNTER
Patient called to speak with [facility] regarding rescheduling botox appt from 1/15/21  Tried to reschedule, appt not avail until FEB  Tried to transfer patient to [facility] to speak with [facility] directly for an earlier appt as they tried to resched 3 days after orig appt but auth was not received, informed patient she was not inat this time would be by 930 (per [facility]) asked patient to call [place] office   Patient was agreeable

## 2021-01-22 NOTE — TELEPHONE ENCOUNTER
Botox is scheduled for delivery Tuesday 1/26/2021, this is the absolute soonest botox can be delivered as [facility] does not deliver on Monday  Please advise once appointment has been scheduled so referral can be attached  Thanks!   Mariano Esquivel

## 2021-01-26 NOTE — TELEPHONE ENCOUNTER
Botox number of units: 100 units   Botox quantity: 2 Vials   Arrived at what location: [place]   Lot number: [id number]  Expiration Date: 10/01/23  Appt notes indicate correct medication: yes    Documented and stored

## 2021-02-02 ENCOUNTER — TELEPHONE (OUTPATIENT)
Dept: NEUROLOGY | Facility: CLINIC | Age: 41
End: 2021-02-02

## 2021-02-02 NOTE — TELEPHONE ENCOUNTER
YURIDIA to confirm Botox appt on 2/3 with [address]  Advised patient office is open normal business hours  Call the office if she needs to r/s

## 2021-02-03 ENCOUNTER — PROCEDURE VISIT (OUTPATIENT)
Dept: NEUROLOGY | Facility: CLINIC | Age: 41
End: 2021-02-03
Payer: COMMERCIAL

## 2021-02-03 VITALS — SYSTOLIC BLOOD PRESSURE: 126 MMHG | DIASTOLIC BLOOD PRESSURE: 77 MMHG | HEART RATE: 95 BPM | TEMPERATURE: 98.2 F

## 2021-02-03 DIAGNOSIS — G43.709 CHRONIC MIGRAINE WITHOUT AURA: Primary | ICD-10-CM

## 2021-02-03 PROCEDURE — 64615 CHEMODENERV MUSC MIGRAINE: CPT | Performed by: PHYSICIAN ASSISTANT

## 2021-02-03 RX ORDER — INDOMETHACIN 25 MG/1
CAPSULE ORAL
Qty: 30 CAPSULE | Refills: 0 | Status: SHIPPED | OUTPATIENT
Start: 2021-02-03 | End: 2021-03-26

## 2021-02-03 NOTE — PROGRESS NOTES
Universal Protocol   Consent: Verbal consent obtained  Written consent obtained    Risks and benefits: risks, benefits and alternatives were discussed  Consent given by: patient        Chemodenervation     Date/Time 2/3/2021 2:07 PM     Performed by  Eldon Eason PA-C     Authorized by Eldon Eason PA-C        Pre-procedure details      Prepped With: Alcohol     Procedure details     Position:  Upright   Botox     Botox Type:  Type A    Brand:  Botox    mL's of Botulinum Toxin:  175    Final Concentration per CC:  100 units    Needle Gauge:  30 G 2 5 inch   Procedures     Botox Procedures: chronic headache      Indications: migraines     Injection Location      Head / Face:  L , R , L frontalis, R frontalis, R inferior cervical paraspinal, L inferior cervical paraspinal, L medial occipitalis, R medial occipitalis, L lateral occipitalis, R lateral occipitalis, procerus, L temporalis, R temporalis, R superior trapezius and L superior trapezius    L  injection amount:  5 unit(s)    R  injection amount:  5 unit(s)    L lateral frontalis:  5 unit(s)    R lateral frontalis:  5 unit(s)    L medial frontalis:  5 unit(s)    R medial frontalis:  5 unit(s)    L temporalis injection amount:  20 unit(s)    R temporalis injection amount:  20 unit(s)    Procerus injection amount:  5 unit(s)    L lateral occipitalis injection amount:  5 unit(s)    R lateral occipitalis injection amount:  5 unit(s)    L medial occipitalis injection amount:  10 unit(s)    R medial occipitalis injection amount:  10 unit(s)    L inferior cervical paraspinal injection amount:  10 unit(s)    R inferior cervical paraspinal injection amount:  10 unit(s)    L superior trapezius injection amount:  0 unit(s)    R superior trapezius injection amount:  0 unit(s)   Total Units     Total units used:  175    Total units discarded:  25   Post-procedure details      Chemodenervation:  Chronic migraine    Facial Nerve Location[de-identified]  Bilateral facial nerve    Patient tolerance of procedure: Tolerated well, no immediate complications   Comments      Extra units medically necessary:  -10 L temporalis  -10 R temporalis  -30 t/o the scalp (b/)  Avoided traps  Unfortunately copay for DHE is too high  She will trial indocin instead of toradol, with food  Trial Nurtec instead of sumatriptan  Maybe can go back to imitrex injection in the future  S e of all above reviewed  Blood pressure 126/77, pulse 95, temperature 98 2 °F (36 8 °C), temperature source Oral, not currently breastfeeding

## 2021-02-24 DIAGNOSIS — G43.701 CHRONIC MIGRAINE WITHOUT AURA WITH STATUS MIGRAINOSUS, NOT INTRACTABLE: ICD-10-CM

## 2021-02-25 RX ORDER — GABAPENTIN 100 MG/1
CAPSULE ORAL
Qty: 180 CAPSULE | Refills: 0 | Status: SHIPPED | OUTPATIENT
Start: 2021-02-25 | End: 2021-05-12

## 2021-03-18 ENCOUNTER — ANNUAL EXAM (OUTPATIENT)
Dept: OBGYN CLINIC | Facility: CLINIC | Age: 41
End: 2021-03-18
Payer: COMMERCIAL

## 2021-03-18 VITALS
WEIGHT: 172.4 LBS | SYSTOLIC BLOOD PRESSURE: 120 MMHG | DIASTOLIC BLOOD PRESSURE: 80 MMHG | BODY MASS INDEX: 29.43 KG/M2 | HEIGHT: 64 IN

## 2021-03-18 DIAGNOSIS — Z12.4 SCREENING FOR MALIGNANT NEOPLASM OF THE CERVIX: ICD-10-CM

## 2021-03-18 DIAGNOSIS — Z76.89 ENCOUNTER FOR MENSTRUAL REGULATION: ICD-10-CM

## 2021-03-18 DIAGNOSIS — Z11.51 SPECIAL SCREENING EXAMINATION FOR HUMAN PAPILLOMAVIRUS (HPV): ICD-10-CM

## 2021-03-18 DIAGNOSIS — Z01.419 WELL WOMAN EXAM WITH ROUTINE GYNECOLOGICAL EXAM: Primary | ICD-10-CM

## 2021-03-18 PROCEDURE — G0145 SCR C/V CYTO,THINLAYER,RESCR: HCPCS | Performed by: PATHOLOGY

## 2021-03-18 PROCEDURE — 3008F BODY MASS INDEX DOCD: CPT | Performed by: OBSTETRICS & GYNECOLOGY

## 2021-03-18 PROCEDURE — 99395 PREV VISIT EST AGE 18-39: CPT | Performed by: OBSTETRICS & GYNECOLOGY

## 2021-03-18 PROCEDURE — G0124 SCREEN C/V THIN LAYER BY MD: HCPCS | Performed by: PATHOLOGY

## 2021-03-18 PROCEDURE — 87624 HPV HI-RISK TYP POOLED RSLT: CPT | Performed by: OBSTETRICS & GYNECOLOGY

## 2021-03-18 PROCEDURE — 1036F TOBACCO NON-USER: CPT | Performed by: OBSTETRICS & GYNECOLOGY

## 2021-03-18 RX ORDER — ONABOTULINUMTOXINA 100 [USP'U]/1
INJECTION, POWDER, LYOPHILIZED, FOR SOLUTION INTRADERMAL; INTRAMUSCULAR
COMMUNITY
Start: 2021-01-22 | End: 2021-09-07

## 2021-03-18 RX ORDER — NORETHINDRONE ACETATE AND ETHINYL ESTRADIOL AND FERROUS FUMARATE 1MG-20(24)
1 KIT ORAL DAILY
Qty: 84 TABLET | Refills: 4 | Status: SHIPPED | OUTPATIENT
Start: 2021-03-18 | End: 2022-05-04 | Stop reason: ALTCHOICE

## 2021-03-19 ENCOUNTER — TELEPHONE (OUTPATIENT)
Dept: NEUROLOGY | Facility: CLINIC | Age: 41
End: 2021-03-19

## 2021-03-19 NOTE — TELEPHONE ENCOUNTER
Type Date User Summary Attachment   General 03/18/2021 10:50 AM Josef Batres care coordination  -   Note    Botox- authorization #: [id number]- 2nd visit- valid from 1/18/2021 until 1/18/2022   Please use [facility]      Thank you,     Susu Arora

## 2021-03-23 ENCOUNTER — TELEPHONE (OUTPATIENT)
Dept: FAMILY MEDICINE CLINIC | Facility: CLINIC | Age: 41
End: 2021-03-23

## 2021-03-23 DIAGNOSIS — E78.2 MIXED HYPERLIPIDEMIA: Primary | ICD-10-CM

## 2021-03-23 LAB
HPV HR 12 DNA CVX QL NAA+PROBE: NEGATIVE
HPV16 DNA CVX QL NAA+PROBE: NEGATIVE
HPV18 DNA CVX QL NAA+PROBE: NEGATIVE

## 2021-03-26 DIAGNOSIS — G43.709 CHRONIC MIGRAINE WITHOUT AURA: ICD-10-CM

## 2021-03-26 LAB
LAB AP GYN PRIMARY INTERPRETATION: ABNORMAL
Lab: ABNORMAL
PATH INTERP SPEC-IMP: ABNORMAL

## 2021-03-26 RX ORDER — INDOMETHACIN 25 MG/1
CAPSULE ORAL
Qty: 30 CAPSULE | Refills: 0 | Status: SHIPPED | OUTPATIENT
Start: 2021-03-26 | End: 2021-05-12

## 2021-04-01 NOTE — TELEPHONE ENCOUNTER
[facility] spoke with Oliver Dave, advised I was calling to schek the status of paten's botox order  Oliver Dave states that patient's order is ready to scheduled for delivery however patient's consent for shipment is needed  Attempted to contact patient with Zaynab Holden on the line but was unsuccessful, left message  If patient calls back please advise that his botox medication is ready to be scheduled for delivery from [facility] [address](also know as [facility])  Patient needs to call Zaynab Holden @ [phone] to provide consent for shipment and review any co-pay if applicable  Will attempt to contact patient tomorrow

## 2021-04-02 NOTE — TELEPHONE ENCOUNTER
Called, spoke with patient, advised that her botox is ready to scheduled for delivery from [facility]  Asked patient to call Doug Marcelo @ [phone] to provide her consent for shipment and review co-pay  Patient verbalized understanding and states she will call Doug Marcelo today to provide consent  Will call Doug Marcelo on Monday to finalize delivery

## 2021-04-05 NOTE — TELEPHONE ENCOUNTER
[address] spoke with Abbe Shaikh, advised I was calling to check the status of patient's botox order  [address] Shaikh states that patient's order is ready to be scheduled for delivery however patient's consent is needed, attempted to contact patient with Dawna Meadows on the line  Connected patient with Dawna Meadows and patient was able to provide her consent for shipment  Botox is scheduled for delivery Wednesday 4/7/2021 to the \[facility]\"" location via [address]    Venkat Reaves, please await patient's botox delivery and document once received, please advise if medication is not received by 2pm     Thanks  Humaira Win

## 2021-04-07 NOTE — TELEPHONE ENCOUNTER
Botox number of units: 100 units  Botox quantity: 2 vials  Arrived at what location: [address] [facility]  Lot number: [id number]   Expiration Date: 11/01/2023  Appt notes indicate correct medication: yes    Botox is logged and stored

## 2021-04-16 ENCOUNTER — APPOINTMENT (EMERGENCY)
Dept: RADIOLOGY | Facility: HOSPITAL | Age: 41
End: 2021-04-16
Payer: COMMERCIAL

## 2021-04-16 ENCOUNTER — HOSPITAL ENCOUNTER (EMERGENCY)
Facility: HOSPITAL | Age: 41
Discharge: HOME | End: 2021-04-17
Attending: EMERGENCY MEDICINE | Admitting: EMERGENCY MEDICINE
Payer: COMMERCIAL

## 2021-04-16 VITALS
HEART RATE: 85 BPM | RESPIRATION RATE: 18 BRPM | HEIGHT: 64 IN | TEMPERATURE: 98.5 F | DIASTOLIC BLOOD PRESSURE: 73 MMHG | OXYGEN SATURATION: 98 % | SYSTOLIC BLOOD PRESSURE: 115 MMHG | WEIGHT: 170 LBS | BODY MASS INDEX: 29.02 KG/M2

## 2021-04-16 DIAGNOSIS — R07.9 CHEST PAIN: Primary | ICD-10-CM

## 2021-04-16 DIAGNOSIS — Z87.19 HISTORY OF GASTROESOPHAGEAL REFLUX (GERD): ICD-10-CM

## 2021-04-16 LAB
ANION GAP SERPL CALCULATED.3IONS-SCNC: 10 MMOL/L (ref 4–13)
BASOPHILS # BLD AUTO: 0.05 THOUSANDS/ΜL (ref 0–0.1)
BASOPHILS NFR BLD AUTO: 1 % (ref 0–1)
BUN SERPL-MCNC: 13 MG/DL (ref 5–25)
CALCIUM SERPL-MCNC: 9.4 MG/DL (ref 8.3–10.1)
CHLORIDE SERPL-SCNC: 104 MMOL/L (ref 100–108)
CO2 SERPL-SCNC: 26 MMOL/L (ref 21–32)
CREAT SERPL-MCNC: 0.64 MG/DL (ref 0.6–1.3)
EOSINOPHIL # BLD AUTO: 0.11 THOUSAND/ΜL (ref 0–0.61)
EOSINOPHIL NFR BLD AUTO: 1 % (ref 0–6)
ERYTHROCYTE [DISTWIDTH] IN BLOOD BY AUTOMATED COUNT: 12.8 % (ref 11.6–15.1)
EXT PREG TEST URINE: NEGATIVE
EXT. CONTROL ED NAV: NORMAL
GFR SERPL CREATININE-BSD FRML MDRD: 114 ML/MIN/1.73SQ M
GLUCOSE SERPL-MCNC: 79 MG/DL (ref 65–140)
HCT VFR BLD AUTO: 35.7 % (ref 34.8–46.1)
HGB BLD-MCNC: 11.8 G/DL (ref 11.5–15.4)
IMM GRANULOCYTES # BLD AUTO: 0.04 THOUSAND/UL (ref 0–0.2)
IMM GRANULOCYTES NFR BLD AUTO: 0 % (ref 0–2)
LIPASE SERPL-CCNC: 213 U/L (ref 73–393)
LYMPHOCYTES # BLD AUTO: 2.71 THOUSANDS/ΜL (ref 0.6–4.47)
LYMPHOCYTES NFR BLD AUTO: 30 % (ref 14–44)
MCH RBC QN AUTO: 30.6 PG (ref 26.8–34.3)
MCHC RBC AUTO-ENTMCNC: 33.1 G/DL (ref 31.4–37.4)
MCV RBC AUTO: 93 FL (ref 82–98)
MONOCYTES # BLD AUTO: 0.82 THOUSAND/ΜL (ref 0.17–1.22)
MONOCYTES NFR BLD AUTO: 9 % (ref 4–12)
NEUTROPHILS # BLD AUTO: 5.46 THOUSANDS/ΜL (ref 1.85–7.62)
NEUTS SEG NFR BLD AUTO: 59 % (ref 43–75)
NRBC BLD AUTO-RTO: 0 /100 WBCS
PLATELET # BLD AUTO: 239 THOUSANDS/UL (ref 149–390)
PMV BLD AUTO: 12.2 FL (ref 8.9–12.7)
POTASSIUM SERPL-SCNC: 4.1 MMOL/L (ref 3.5–5.3)
RBC # BLD AUTO: 3.85 MILLION/UL (ref 3.81–5.12)
SODIUM SERPL-SCNC: 140 MMOL/L (ref 136–145)
TROPONIN I SERPL-MCNC: <0.02 NG/ML
WBC # BLD AUTO: 9.19 THOUSAND/UL (ref 4.31–10.16)

## 2021-04-16 PROCEDURE — 99285 EMERGENCY DEPT VISIT HI MDM: CPT | Performed by: PHYSICIAN ASSISTANT

## 2021-04-16 PROCEDURE — 84484 ASSAY OF TROPONIN QUANT: CPT | Performed by: PHYSICIAN ASSISTANT

## 2021-04-16 PROCEDURE — 85025 COMPLETE CBC W/AUTO DIFF WBC: CPT | Performed by: EMERGENCY MEDICINE

## 2021-04-16 PROCEDURE — 93005 ELECTROCARDIOGRAM TRACING: CPT

## 2021-04-16 PROCEDURE — 84484 ASSAY OF TROPONIN QUANT: CPT | Performed by: EMERGENCY MEDICINE

## 2021-04-16 PROCEDURE — 81025 URINE PREGNANCY TEST: CPT | Performed by: EMERGENCY MEDICINE

## 2021-04-16 PROCEDURE — 99285 EMERGENCY DEPT VISIT HI MDM: CPT

## 2021-04-16 PROCEDURE — 83690 ASSAY OF LIPASE: CPT | Performed by: PHYSICIAN ASSISTANT

## 2021-04-16 PROCEDURE — 36415 COLL VENOUS BLD VENIPUNCTURE: CPT | Performed by: EMERGENCY MEDICINE

## 2021-04-16 PROCEDURE — 80048 BASIC METABOLIC PNL TOTAL CA: CPT | Performed by: EMERGENCY MEDICINE

## 2021-04-16 PROCEDURE — 71045 X-RAY EXAM CHEST 1 VIEW: CPT

## 2021-04-17 LAB
ATRIAL RATE: 101 BPM
ATRIAL RATE: 85 BPM
ATRIAL RATE: 88 BPM
P AXIS: 44 DEGREES
P AXIS: 46 DEGREES
P AXIS: 47 DEGREES
PR INTERVAL: 114 MS
PR INTERVAL: 132 MS
PR INTERVAL: 150 MS
QRS AXIS: -4 DEGREES
QRS AXIS: 1 DEGREES
QRS AXIS: 6 DEGREES
QRSD INTERVAL: 82 MS
QRSD INTERVAL: 82 MS
QRSD INTERVAL: 88 MS
QT INTERVAL: 322 MS
QT INTERVAL: 340 MS
QT INTERVAL: 346 MS
QTC INTERVAL: 404 MS
QTC INTERVAL: 409 MS
QTC INTERVAL: 419 MS
T WAVE AXIS: 10 DEGREES
T WAVE AXIS: 11 DEGREES
T WAVE AXIS: 5 DEGREES
TROPONIN I SERPL-MCNC: <0.02 NG/ML
VENTRICULAR RATE: 82 BPM
VENTRICULAR RATE: 91 BPM
VENTRICULAR RATE: 97 BPM

## 2021-04-17 PROCEDURE — 93010 ELECTROCARDIOGRAM REPORT: CPT | Performed by: INTERNAL MEDICINE

## 2021-04-17 RX ORDER — FAMOTIDINE 20 MG/1
20 TABLET, FILM COATED ORAL 2 TIMES DAILY
Qty: 30 TABLET | Refills: 0 | Status: SHIPPED | OUTPATIENT
Start: 2021-04-17 | End: 2022-05-04 | Stop reason: ALTCHOICE

## 2021-04-17 NOTE — ED PROVIDER NOTES
History  Chief Complaint   Patient presents with    Chest Pain     Pt reports thinking her heartburn was acting up, but felt so intense that she got concerned  States lasted only 5 min, then went away  Occurred around 1700  Describes pain as squeezing around her breast into her upper back, states felt like a muscle spasm  States only soreness now  Denies SOB at this time  Patient is a 72-year-old female with history of migraines and no significant past surgical history that presents emergency department with centralized nonradiating aching/squeezing chest pain for 2 minutes 4 hours ago  Patient denies associated symptomatology  Patient states that she had taken her indomethacin for a migraine with centralized chest pain occurring 2 hours after ingestion of indomethacin  Patient states that current chest pain feels like earlier bouts of GERD that she had in the past   Patient denies chest pain at this time  Patient denies history of DVT, PE, and thrombophlebitis  Patient denies palliative and provocative factors  Patient is not effective treatment  Patient's fevers, chills, nausea, vomiting, diarrhea, constipation urinary symptoms  Patient's recent fall or recent trauma  Patient denies sick contacts or recent travel  Patient denies shortness of breath, and abdominal pain        History provided by:  Patient   used: No    Chest Pain  Pain location:  Substernal area  Pain quality: aching    Pain radiates to:  Does not radiate  Pain radiates to the back: no    Pain severity:  Mild  Onset quality:  Gradual  Duration:  2 minutes  Timing:  Sporadic  Progression:  Resolved  Chronicity:  New  Relieved by:  Nothing  Worsened by:  Nothing tried  Ineffective treatments:  None tried  Associated symptoms: no abdominal pain, no anxiety, no back pain, no claudication, no cough, no dizziness, no fatigue, no fever, no headache, no nausea, no numbness, no orthopnea, no palpitations, no shortness of breath, not vomiting and no weakness    Risk factors: no birth control, no coronary artery disease and no prior DVT/PE        Prior to Admission Medications   Prescriptions Last Dose Informant Patient Reported? Taking? Botox 100 units   Yes No   Coenzyme Q10 (COQ10) 100 MG CAPS   No No   Si cap qd  Rimegepant Sulfate (NURTEC) 75 MG TBDP   No No   Si tab at migraine onset  No more than one dose per 24 hours  Hold triptan  SUMAtriptan (IMITREX) 100 mg tablet   No No   Sig: TAKE ONE TAB AT MIGRAINE ONSET, REPEAT AFTER 2 HR IF NEEDED  NO MORE THAN 2/24 HOURS OR 3 PER WEEK  SUMAtriptan (IMITREX) 6 mg/0 5 mL   No No   Sig: INJECT 0 5 ML (6 MG TOTAL) UNDER THE SKIN ONCE FOR 1 DOSE   Syringe/Needle, Disp, (SYRINGE 3CC/00KV8-8/4") 27G X 1-4" 3 ML MISC   No No   Sig: Use for Toradol intramuscular injections  TiZANidine (ZANAFLEX) 2 MG capsule   No No   Sig: TAKE 1 CAPSULE (2 MG TOTAL) BY MOUTH AS NEEDED FOR MUSCLE SPASMS   dihydroergotamine (MIGRANAL) 4 MG/ML nasal spray   No No   Sig: One spray in one nostril, repeat aafter 30 minutes if needed  No more than 4 doses per month  Hold imitrex   Patient not taking: Reported on 3/18/2021   gabapentin (NEURONTIN) 100 mg capsule   No No   Sig: TAKE 2 CAPSULES BY MOUTH EVERY DAY AT BEDTIME   ibuprofen (MOTRIN) 600 mg tablet   No No   Sig: Take 1 tablet (600 mg total) by mouth every 6 (six) hours as needed for mild pain or moderate pain   indomethacin (INDOCIN) 25 mg capsule   No No   Sig: TAKE 1-2 TABLETS EVERY 8 HOURS AS NEEDED FOR HEADACHE (WITH IMITREX IF NEEDED) WITH FOOD    ketorolac (TORADOL) 10 mg tablet   No No   Sig: Take 1 tablet (10 mg total) by mouth every 6 (six) hours as needed (migraine) Max 2-3 per week   Hold advil   norethindrone-ethinyl estradiol-ferrous fumarate (LOESTIN 24 FE) 1-20 MG-MCG(24) per tablet   No No   Sig: Take 1 tablet by mouth daily      Facility-Administered Medications: None       Past Medical History:   Diagnosis Date    Abnormal Pap smear of cervix     In her early 21's    Back pain     Irregular heart beat     Palpitations    Menorrhagia     Migraines        Past Surgical History:   Procedure Laterality Date    OOPHORECTOMY Left 12/7/2020    Procedure: SALPINGO-OOPHORECTOMY, LAPAROSCOPIC;  Surgeon: Monty Watt DO;  [facility];  Service: Gynecology    OVARIAN CYST SURGERY      VA HYSTEROSCOPY,W/ENDO BX N/A 11/9/2017    Procedure: DILATATION AND CURETTAGE (D&C) WITH HYSTEROSCOPY;  Surgeon: David Hernandez DO;  [facility];  Service: Gynecology    VA HYSTEROSCOPY,W/ENDOMETRIAL ABLATION N/A 11/9/2017    Procedure: ABLATION ENDOMETRIAL   [place];  Surgeon: David Hernandez DO;  [facility];  Service: Gynecology    VA LAP,DIAGNOSTIC ABDOMEN N/A 12/7/2020    Procedure: LAPAROSCOPY DIAGNOSTIC;  Surgeon: Monty Watt DO;  [facility];  Service: Gynecology    WISDOM TOOTH EXTRACTION         Family History   Problem Relation Age of Onset    No Known Problems Mother     No Known Problems Father     No Known Problems Brother     No Known Problems Daughter     No Known Problems Son     Dementia Maternal Grandmother     Heart disease Maternal Grandmother     Diabetes Maternal Grandmother     No Known Problems Maternal Grandfather     No Known Problems Paternal Grandmother     Heart disease Paternal Grandfather      I have reviewed and agree with the history as documented  E-Cigarette/Vaping    E-Cigarette Use Never User      E-Cigarette/Vaping Substances    Nicotine No     THC No     CBD No     Flavoring No     Other No     Unknown No      Social History     Tobacco Use    Smoking status: Never Smoker    Smokeless tobacco: Never Used   Substance Use Topics    Alcohol use: No    Drug use: Never       Review of Systems   Constitutional: Negative for activity change, appetite change, chills, fatigue and fever     HENT: Negative for congestion, postnasal drip, rhinorrhea, sinus pressure, sinus pain, sore throat and tinnitus  Eyes: Negative for photophobia and visual disturbance  Respiratory: Negative for cough, chest tightness and shortness of breath  Cardiovascular: Positive for chest pain  Negative for palpitations, orthopnea and claudication  Gastrointestinal: Negative for abdominal pain, constipation, diarrhea, nausea and vomiting  Genitourinary: Negative for difficulty urinating, dysuria, flank pain, frequency and urgency  Musculoskeletal: Negative for back pain, gait problem, neck pain and neck stiffness  Skin: Negative for pallor and rash  Allergic/Immunologic: Negative for environmental allergies and food allergies  Neurological: Negative for dizziness, weakness, numbness and headaches  Psychiatric/Behavioral: Negative for confusion  All other systems reviewed and are negative  Physical Exam  Physical Exam  Vitals signs and nursing note reviewed  Constitutional:       General: She is awake  Appearance: Normal appearance  She is well-developed  She is not ill-appearing, toxic-appearing or diaphoretic  Comments: /89 (BP Location: Left arm)   Pulse 105   Temp 98 5 °F (36 9 °C) (Oral)   Resp 20   Ht 5' 4" (1 626 m)   Wt 77 1 kg (170 lb)   LMP 03/18/2021 (Approximate)   SpO2 100%   BMI 29 18 kg/m²      HENT:      Head: Normocephalic and atraumatic  Right Ear: Hearing and external ear normal  No decreased hearing noted  No drainage, swelling or tenderness  No mastoid tenderness  Left Ear: Hearing and external ear normal  No decreased hearing noted  No drainage, swelling or tenderness  No mastoid tenderness  Nose: Nose normal       Mouth/Throat:      Lips: Pink  Mouth: Mucous membranes are moist       Pharynx: Oropharynx is clear  Uvula midline  Eyes:      General: Lids are normal  Vision grossly intact  Right eye: No discharge  Left eye: No discharge        Extraocular Movements: Extraocular movements intact  Conjunctiva/sclera: Conjunctivae normal       Pupils: Pupils are equal, round, and reactive to light  Neck:      Musculoskeletal: Full passive range of motion without pain, normal range of motion and neck supple  Normal range of motion  No neck rigidity, spinous process tenderness or muscular tenderness  Vascular: No JVD  Trachea: Trachea and phonation normal  No tracheal tenderness or tracheal deviation  Cardiovascular:      Rate and Rhythm: Normal rate and regular rhythm  Pulses: Normal pulses  Radial pulses are 2+ on the right side and 2+ on the left side  Posterior tibial pulses are 2+ on the right side and 2+ on the left side  Heart sounds: Normal heart sounds  Pulmonary:      Effort: Pulmonary effort is normal       Breath sounds: Normal breath sounds  No stridor  No decreased breath sounds, wheezing, rhonchi or rales  Chest:      Chest wall: No tenderness  Abdominal:      General: Abdomen is flat  Bowel sounds are normal  There is no distension  Palpations: Abdomen is soft  Abdomen is not rigid  Tenderness: There is no abdominal tenderness  There is no right CVA tenderness, left CVA tenderness, guarding or rebound  Comments: No abdominal pain on deep palpation all 4 quadrants   Musculoskeletal: Normal range of motion  Lymphadenopathy:      Head:      Right side of head: No submental, submandibular, tonsillar, preauricular, posterior auricular or occipital adenopathy  Left side of head: No submental, submandibular, tonsillar, preauricular, posterior auricular or occipital adenopathy  Cervical: No cervical adenopathy  Right cervical: No superficial, deep or posterior cervical adenopathy  Left cervical: No superficial, deep or posterior cervical adenopathy  Skin:     General: Skin is warm  Capillary Refill: Capillary refill takes less than 2 seconds     Neurological:      General: No focal deficit present  Mental Status: She is alert and oriented to person, place, and time  GCS: GCS eye subscore is 4  GCS verbal subscore is 5  GCS motor subscore is 6  Sensory: No sensory deficit  Deep Tendon Reflexes: Reflexes are normal and symmetric  Reflex Scores:       Patellar reflexes are 2+ on the right side and 2+ on the left side  Psychiatric:         Mood and Affect: Mood normal          Speech: Speech normal          Behavior: Behavior normal  Behavior is cooperative  Thought Content:  Thought content normal          Judgment: Judgment normal          Vital Signs  ED Triage Vitals [04/16/21 2045]   Temperature Pulse Respirations Blood Pressure SpO2   98 5 °F (36 9 °C) 105 20 136/89 100 %      Temp Source Heart Rate Source Patient Position - Orthostatic VS BP Location FiO2 (%)   Oral Monitor Sitting Left arm --      Pain Score       1           Vitals:    04/16/21 2045 04/16/21 2343   BP: 136/89 115/73   Pulse: 105 85   Patient Position - Orthostatic VS: Sitting Lying         Visual Acuity      ED Medications  Medications - No data to display    Diagnostic Studies  Results Reviewed     Procedure Component Value Units Date/Time    Troponin I [[id number]]  (Normal) Collected: 04/16/21 2346    Lab Status: Final result Specimen: Blood from Arm, Right Updated: 04/17/21 0010     Troponin I <0 02 ng/mL     Lipase [[id number]]  (Normal) Collected: 04/16/21 2051    Lab Status: Final result Specimen: Blood from Arm, Right Updated: 04/16/21 2345     Lipase 213 u/L     POCT pregnancy, urine [[id number]]  (Normal) Resulted: 04/16/21 2231    Lab Status: Final result Updated: 04/16/21 2231     EXT PREG TEST UR (Ref: Negative) NEGATIVE     Control VALID    Troponin I [[id number]]  (Normal) Collected: 04/16/21 2051    Lab Status: Final result Specimen: Blood from Arm, Right Updated: 04/16/21 2120     Troponin I <0 02 ng/mL     Basic metabolic panel [[id number]] Collected: 04/16/21 2051    Lab Status: Final result Specimen: Blood from Arm, Right Updated: 04/16/21 2110     Sodium 140 mmol/L      Potassium 4 1 mmol/L      Chloride 104 mmol/L      CO2 26 mmol/L      ANION GAP 10 mmol/L      BUN 13 mg/dL      Creatinine 0 64 mg/dL      Glucose 79 mg/dL      Calcium 9 4 mg/dL      eGFR 114 ml/min/1 73sq m     Narrative:      [place] guidelines for Chronic Kidney Disease (CKD):     Stage 1 with normal or high GFR (GFR > 90 mL/min/1 73 square meters)    Stage 2 Mild CKD (GFR = 60-89 mL/min/1 73 square meters)    Stage 3A Moderate CKD (GFR = 45-59 mL/min/1 73 square meters)    Stage 3B Moderate CKD (GFR = 30-44 mL/min/1 73 square meters)    Stage 4 Severe CKD (GFR = 15-29 mL/min/1 73 square meters)    Stage 5 End Stage CKD (GFR <15 mL/min/1 73 square meters)  Note: GFR calculation is accurate only with a steady state creatinine    CBC and differential [[id number]] Collected: 04/16/21 2051    Lab Status: Final result Specimen: Blood from Arm, Right Updated: 04/16/21 2103     WBC 9 19 Thousand/uL      RBC 3 85 Million/uL      Hemoglobin 11 8 g/dL      Hematocrit 35 7 %      MCV 93 fL      MCH 30 6 pg      MCHC 33 1 g/dL      RDW 12 8 %      MPV 12 2 fL      Platelets 543 Thousands/uL      nRBC 0 /100 WBCs      Neutrophils Relative 59 %      Immat GRANS % 0 %      Lymphocytes Relative 30 %      Monocytes Relative 9 %      Eosinophils Relative 1 %      Basophils Relative 1 %      Neutrophils Absolute 5 46 Thousands/µL      Immature Grans Absolute 0 04 Thousand/uL      Lymphocytes Absolute 2 71 Thousands/µL      Monocytes Absolute 0 82 Thousand/µL      Eosinophils Absolute 0 11 Thousand/µL      Basophils Absolute 0 05 Thousands/µL                  XR chest 1 view portable    (Results Pending)              Procedures  ECG 12 Lead Documentation Only    Date/Time: 4/16/2021 9:51 PM  Performed by: Hannah Hanson PA-C  Authorized by: Hannah Hanson PA-C     Indications / Diagnosis: Centralized chest pain  ECG reviewed by me, the ED Provider: yes    Patient location:  ED  Previous ECG:     Previous ECG:  Unavailable    Comparison to cardiac monitor: Yes    Interpretation:     Interpretation: normal    Rate:     ECG rate:  97    ECG rate assessment: normal    Rhythm:     Rhythm: sinus rhythm    Ectopy:     Ectopy: none    QRS:     QRS axis:  Normal    QRS intervals:  Normal  Conduction:     Conduction: normal    ST segments:     ST segments:  Normal  T waves:     T waves: normal    ECG 12 Lead Documentation Only    Date/Time: 4/16/2021 11:49 PM  Performed by: Jony Newell PA-C  Authorized by: Jony Newell PA-C     Indications / Diagnosis:  Chest pain  ECG reviewed by me, the ED Provider: yes    Patient location:  ED  Previous ECG:     Previous ECG:  Compared to current    Comparison ECG info: When compared to ECG April 16, 2021    Similarity:  No change    Comparison to cardiac monitor: Yes    Interpretation:     Interpretation: normal    Rate:     ECG rate assessment: normal    Rhythm:     Rhythm: sinus rhythm    Ectopy:     Ectopy: none    QRS:     QRS axis:  Normal    QRS intervals:  Normal  Conduction:     Conduction: normal    ST segments:     ST segments:  Normal             ED Course             HEART Risk Score      Most Recent Value   Heart Score Risk Calculator   History  0 Filed at: 04/17/2021 0036   ECG  0 Filed at: 04/17/2021 0036   Age  0 Filed at: 04/17/2021 0036   Risk Factors  0 Filed at: 04/17/2021 0036   Troponin  0 Filed at: 04/17/2021 0036   HEART Score  0 Filed at: 04/17/2021 0036                      SBIRT 22yo+      Most Recent Value   SBIRT (23 yo +)   In order to provide better care to our patients, we are screening all of our patients for alcohol and drug use  Would it be okay to ask you these screening questions?   Unable to answer at this time Filed at: 04/16/2021 2047            Vicki Currie' Criteria for DVT      Most Recent Value   Venancio' Criteria for DVT   Active cancer Treatment or palliation within 6 months  0 Filed at: 04/16/2021 2331   Bedridden recently >3 days or major surgery within 12 weeks  0 Filed at: 04/16/2021 2331   Calf swelling >3 cm compared to the other leg  0 Filed at: 04/16/2021 2331   Entire leg swollen  0 Filed at: 04/16/2021 2331   Collateral (nonvaricose) superficial veins present  0 Filed at: 04/16/2021 2331   Localized tenderness along the deep venous system  0 Filed at: 04/16/2021 2331   Pitting edema, confined to symptomatic leg  0 Filed at: 04/16/2021 2331   Paralysis, paresis, or recent plaster immobilization of the lower extremity  0 Filed at: 04/16/2021 2331   Previously documented DVT  0 Filed at: 04/16/2021 2331   Alternative diagnosis to DVT as likely or more likely  0 Filed at: 04/16/2021 2331   Wells DVT Critera Score  0 Filed at: 04/16/2021 2331              MDM  Number of Diagnoses or Management Options  Chest pain: new and does not require workup  History of gastroesophageal reflux (GERD): new and does not require workup     Amount and/or Complexity of Data Reviewed  Clinical lab tests: ordered and reviewed  Tests in the radiology section of CPT®: ordered and reviewed  Review and summarize past medical records: yes    Risk of Complications, Morbidity, and/or Mortality  Presenting problems: moderate  Diagnostic procedures: moderate  Management options: low    Patient Progress  Patient progress: stable    Patient is a 81-year-old female with history of migraines and no significant past surgical history that presents emergency department with centralized nonradiating aching/squeezing chest pain for 2 minutes 4 hours ago  Patient denies associated symptomatology     Patient hemodynamically stable and afebrile  Hemodynamically stable and afebrile; benign physical exam  No leukocytosis, no banding with systemic infection unlikely  negative lipase with acute pancreatitis unlikely  Initial read on chest x-ray no acute cardiopulmonary disease on initial read  Initial troponin negative, delta troponin negative  Initial ECG normal sinus rhythm, delta ECG normal sinus rhythm   Wells 0, Heart 0  Negative urine pregnacy  Prescribed pepcid and counseled patient on medication administration and side effects  Suspect GERD symptoms given patient verbalized history of symptoms similar to current with possible gastric ulcer; pain felt after indomethacin delivery  Follow-up with cardiology for ordered exercise stress test  Follow-up with PCP  Follow up with emergency department symptoms persist or exacerbate  Patient demonstrates verbal understanding of all clinical laboratory and imaging findings, discharge instructions, follow-up, and treatment plan    Disposition  Final diagnoses:   Chest pain   History of gastroesophageal reflux (GERD)     Time reflects when diagnosis was documented in both MDM as applicable and the Disposition within this note     Time User Action Codes Description Comment    4/17/2021  1:11 AM [place] Rank Add [R07 9] Chest pain     4/17/2021  1:11 AM [place] Rank Add [Z83 [id number]] Family history of GERD     4/17/2021  1:11 AM [place] Rank Remove [Z83 [id number]] Family history of GERD     4/17/2021  1:11 AM [place] Rank Add [Z87 [id number]] History of gastroesophageal reflux (GERD)       ED Disposition     ED Disposition Condition Date/Time Comment    Discharge Stable Sat Apr 17, 2021  1:10 AM Lainey Maynard discharge to home/self care              Follow-up Information     Follow up With Specialties Details Why Contact Info Additional Information    Jayson Cockayne, MD Family Medicine Call in 1 week for further evaluation of symptoms [address]  [facility] [phone]       [facility] Emergency Medicine Go to  As needed [address] [facility] [address] [facility] Emergency Department, [address], [place], [place], [address] [facility]  For exercise stress test [address] [id number] [address] [facility] [address] [facility], [facility] [place], [place], [address]          Discharge Medication List as of 4/17/2021  1:12 AM      START taking these medications    Details   famotidine (PEPCID) 20 mg tablet Take 1 tablet (20 mg total) by mouth 2 (two) times a day, Starting Sat 4/17/2021, Normal         CONTINUE these medications which have NOT CHANGED    Details   Botox 100 units Starting Fri 1/22/2021, Historical Med      Coenzyme Q10 (COQ10) 100 MG CAPS 1 cap qd  , Normal      dihydroergotamine (MIGRANAL) 4 MG/ML nasal spray One spray in one nostril, repeat aafter 30 minutes if needed  No more than 4 doses per month  Hold imitrex, Normal      gabapentin (NEURONTIN) 100 mg capsule TAKE 2 CAPSULES BY MOUTH EVERY DAY AT BEDTIME, Normal      ibuprofen (MOTRIN) 600 mg tablet Take 1 tablet (600 mg total) by mouth every 6 (six) hours as needed for mild pain or moderate pain, Starting Mon 12/7/2020, Normal      indomethacin (INDOCIN) 25 mg capsule TAKE 1-2 TABLETS EVERY 8 HOURS AS NEEDED FOR HEADACHE (WITH IMITREX IF NEEDED) WITH FOOD , Normal      ketorolac (TORADOL) 10 mg tablet Take 1 tablet (10 mg total) by mouth every 6 (six) hours as needed (migraine) Max 2-3 per week  Hold advil, Starting Fri 1/15/2021, Normal      norethindrone-ethinyl estradiol-ferrous fumarate (LOESTIN 24 FE) 1-20 MG-MCG(24) per tablet Take 1 tablet by mouth daily, Starting Thu 3/18/2021, Normal      Rimegepant Sulfate (NURTEC) 75 MG TBDP 1 tab at migraine onset  No more than one dose per 24 hours  Hold triptan , Normal      SUMAtriptan (IMITREX) 100 mg tablet TAKE ONE TAB AT MIGRAINE ONSET, REPEAT AFTER 2 HR IF NEEDED   NO MORE THAN 2/24 HOURS OR 3 PER WEEK , Normal      SUMAtriptan (IMITREX) 6 mg/0 5 mL INJECT 0 5 ML (6 MG TOTAL) UNDER THE SKIN ONCE FOR 1 DOSE, Starting Wed 12/23/2020, Normal      Syringe/Needle, Disp, (SYRINGE 3CC/98QL4-9/4") 27G X 1-1/4" 3 ML MISC Use for Toradol intramuscular injections  , Normal      TiZANidine (ZANAFLEX) 2 MG capsule TAKE 1 CAPSULE (2 MG TOTAL) BY MOUTH AS NEEDED FOR MUSCLE SPASMS, Starting Fri 11/6/2020, Normal           No discharge procedures on file      PDMP Review       Value Time User    PDMP Reviewed  Yes 12/7/2020  7:00 PM Garfield Shearer DO          ED Provider  Electronically Signed by           April Brown PA-C  04/17/21 [id number]

## 2021-04-17 NOTE — DISCHARGE INSTRUCTIONS
Take Pepcid as indicated  Follow-up with cardiology for exercise stress test  Follow-up with PCP  Follow up emergency department symptoms persist or exacerbate

## 2021-05-04 ENCOUNTER — PROCEDURE VISIT (OUTPATIENT)
Dept: NEUROLOGY | Facility: CLINIC | Age: 41
End: 2021-05-04
Payer: COMMERCIAL

## 2021-05-04 VITALS — SYSTOLIC BLOOD PRESSURE: 110 MMHG | TEMPERATURE: 98.2 F | HEART RATE: 88 BPM | DIASTOLIC BLOOD PRESSURE: 61 MMHG

## 2021-05-04 DIAGNOSIS — G43.709 CHRONIC MIGRAINE WITHOUT AURA WITHOUT STATUS MIGRAINOSUS, NOT INTRACTABLE: ICD-10-CM

## 2021-05-04 DIAGNOSIS — G43.709 CHRONIC MIGRAINE WITHOUT AURA: Primary | ICD-10-CM

## 2021-05-04 PROCEDURE — 64615 CHEMODENERV MUSC MIGRAINE: CPT | Performed by: PHYSICIAN ASSISTANT

## 2021-05-04 NOTE — PROGRESS NOTES
Universal Protocol   Consent: Verbal consent obtained  Written consent obtained    Risks and benefits: risks, benefits and alternatives were discussed  Consent given by: patient  Patient understanding: patient states understanding of the procedure being performed  Patient consent: the patient's understanding of the procedure matches consent given  Procedure consent: procedure consent matches procedure scheduled        Chemodenervation     Date/Time 5/4/2021 9:36 AM     Performed by  Jessica Hernandez PA-C     Authorized by Jessica Hernandez PA-C        Pre-procedure details      Prepped With: Alcohol     Procedure details     Position:  Upright   Botox     Botox Type:  Type A    Brand:  Botox    mL's of Botulinum Toxin:  180    Final Concentration per CC:  100 units    Needle Gauge:  30 G 2 5 inch   Procedures     Botox Procedures: chronic headache      Indications: migraines     Injection Location      Head / Face:  L superior trapezius, R superior trapezius, L superior cervical paraspinal, R superior cervical paraspinal, L , R , procerus, L temporalis, R temporalis, R frontalis, L frontalis, R medial occipitalis and L medial occipitalis    L  injection amount:  5 unit(s)    R  injection amount:  5 unit(s)    L lateral frontalis:  5 unit(s)    R lateral frontalis:  5 unit(s)    L medial frontalis:  5 unit(s)    R medial frontalis:  5 unit(s)    L temporalis injection amount:  20 unit(s)    R temporalis injection amount:  20 unit(s)    Procerus injection amount:  5 unit(s)    L medial occipitalis injection amount:  15 unit(s)    R medial occipitalis injection amount:  15 unit(s)    L superior cervical paraspinal injection amount:  10 unit(s)    R superior cervical paraspinal injection amount:  10 unit(s)    L superior trapezius injection amount:  0 unit(s)    R superior trapezius injection amount:  0 unit(s)   Total Units     Total units used:  180    Total units discarded:  20 Post-procedure details      Chemodenervation:  Chronic migraine    Facial Nerve Location[de-identified]  Bilateral facial nerve    Patient tolerance of procedure: Tolerated well, no immediate complications   Comments      Extra units medically necessary:  -2 5 under left eye/ bridge of nose  -2 5 under right eye/ bridge of nose  -10 L temporalis  -10 R temporalis  -30 t/o the scalp (b/l)  Avoided traps  Continue indocin prn migraine  Discussed to take with food, avoid with other NSAIDs and toradol  Max/ average 10 tabs per month  Denies n/v/ abd pain if taken with food  Blood pressure 110/61, pulse 88, temperature 98 2 °F (36 8 °C), temperature source Oral, not currently breastfeeding

## 2021-05-12 DIAGNOSIS — G43.709 CHRONIC MIGRAINE WITHOUT AURA WITHOUT STATUS MIGRAINOSUS, NOT INTRACTABLE: ICD-10-CM

## 2021-05-12 DIAGNOSIS — G43.709 CHRONIC MIGRAINE WITHOUT AURA: ICD-10-CM

## 2021-05-12 DIAGNOSIS — G43.701 CHRONIC MIGRAINE WITHOUT AURA WITH STATUS MIGRAINOSUS, NOT INTRACTABLE: ICD-10-CM

## 2021-05-12 RX ORDER — TIZANIDINE HYDROCHLORIDE 2 MG/1
2 CAPSULE, GELATIN COATED ORAL AS NEEDED
Qty: 90 CAPSULE | Refills: 1 | Status: SHIPPED | OUTPATIENT
Start: 2021-05-12 | End: 2021-09-27

## 2021-05-12 RX ORDER — GABAPENTIN 100 MG/1
CAPSULE ORAL
Qty: 180 CAPSULE | Refills: 2 | Status: SHIPPED | OUTPATIENT
Start: 2021-05-12 | End: 2022-06-26

## 2021-05-12 RX ORDER — INDOMETHACIN 25 MG/1
CAPSULE ORAL
Qty: 30 CAPSULE | Refills: 0 | Status: SHIPPED | OUTPATIENT
Start: 2021-05-12 | End: 2021-09-27

## 2021-05-12 RX ORDER — SUMATRIPTAN 100 MG/1
TABLET, FILM COATED ORAL
Qty: 9 TABLET | Refills: 0 | Status: SHIPPED | OUTPATIENT
Start: 2021-05-12 | End: 2021-06-14

## 2021-06-07 DIAGNOSIS — G43.709 CHRONIC MIGRAINE WITHOUT AURA WITHOUT STATUS MIGRAINOSUS, NOT INTRACTABLE: ICD-10-CM

## 2021-06-08 RX ORDER — SUMATRIPTAN 6 MG/.5ML
INJECTION, SOLUTION SUBCUTANEOUS
Qty: 2.5 ML | Refills: 0 | Status: SHIPPED | OUTPATIENT
Start: 2021-06-08 | End: 2021-07-28

## 2021-06-08 NOTE — TELEPHONE ENCOUNTER
Is patient still taking the injection? Looks like oral was just requested and refilled by [address] on 5/12/21  Tried to look through notes, looks like she has been tried on many different abortives, but seems to be taking oral sumatriptan, not injectable    Thanks

## 2021-06-08 NOTE — TELEPHONE ENCOUNTER
Called patient  She states she uses either the injectable or PO depending on how severe her symptoms are   She states she typically uses PO but when she is vomiting she will use injection

## 2021-06-12 DIAGNOSIS — G43.709 CHRONIC MIGRAINE WITHOUT AURA WITHOUT STATUS MIGRAINOSUS, NOT INTRACTABLE: ICD-10-CM

## 2021-06-14 ENCOUNTER — TELEPHONE (OUTPATIENT)
Dept: NEUROLOGY | Facility: CLINIC | Age: 41
End: 2021-06-14

## 2021-06-14 RX ORDER — SUMATRIPTAN 100 MG/1
TABLET, FILM COATED ORAL
Qty: 9 TABLET | Refills: 2 | Status: SHIPPED | OUTPATIENT
Start: 2021-06-14 | End: 2021-10-24

## 2021-06-14 NOTE — TELEPHONE ENCOUNTER
Patient is scheduled 8/3/2021 with [facility] in the [facility] location

## 2021-06-14 NOTE — TELEPHONE ENCOUNTER
Type Date User Summary Attachment   General 06/11/2021  4:30 PM Rafael Ochoa care coordination -   Note    Botox - authorization # [id number] - 3rd visit - valid dates 1/18/2021 - 1/18/2022   Please use [facility]      Thanks  Stan Paulino

## 2021-06-30 NOTE — TELEPHONE ENCOUNTER
Type Date User Summary Attachment   General 06/30/2021  9:57 AM Jonathan Head care coordination -   Note    CORRECTED AUTHORIZATION INFORMATION  Botox - authorization # [id number] - 3rd visit - valid dates 1/18/2021 - 1/18/2022   Please use [facility]

## 2021-06-30 NOTE — TELEPHONE ENCOUNTER
Authorization need through pharmacy benefits [employer], submitted authorization through [facility] Portal   Pending authorization # [id number]

## 2021-07-01 NOTE — TELEPHONE ENCOUNTER
Received approval notice from [employer] via [employer] portal, was provided with the following authorization information:  Botox 200 units approved  Authorization # [id number]  Valid dates 6/30/2021 - 9/30/2021  Authorization letter will be faxed

## 2021-07-01 NOTE — TELEPHONE ENCOUNTER
[address] spoke with Christel Anand, advised I was calling to initiate a refill for patient's botox medication  Christel Anand was able to run a live test claim over the phone, order is ready to schedule for delivery however patient's consent for shipment is not yet on file  Christel Anand put a request in to have his outbound team reach out to patient to obtain consent, will also reach out to patient on our end as well    FIRST ATTEMPT  Called patient  Left message asking her to call back  If patient calls back please advise that her Botox medication is ready to schedule for delivery from [facility]  Patient needs to call Len Gillis @ [phone] to provide her consent for shipment     Will attempt patient again tomorrow

## 2021-07-02 NOTE — TELEPHONE ENCOUNTER
Called, spoke with patient, she states she spoke with Ana M yesterday and provided consent for shipment  Will call Victorino Petit on Tuesday to finalize delivery

## 2021-07-06 NOTE — TELEPHONE ENCOUNTER
[address] spoke with Cuong Guerrero advised I was calling to coordinate delivery of patient's botox medication  Cuong Guerrero confirmed consent is on file and order is ready to schedule for delivery  Botox is scheduled for delivery Wednesday 7/7/2021 to the [facility] location via [employer]    Please await patient's botox delivery and document once received, please advise if medication is not received by 2pm     Thanks  Antonia Sims

## 2021-07-07 NOTE — TELEPHONE ENCOUNTER
Botox number of units: 100  Botox quantity: 2  Arrived at what location: [facility]  Botox at [facility]: yes  NDC number: [id number]  Lot number: [id number]  Expiration Date: 10/31/2023  Appt notes indicate correct medication: yes    Botox logged and stored

## 2021-07-28 DIAGNOSIS — G43.709 CHRONIC MIGRAINE WITHOUT AURA WITHOUT STATUS MIGRAINOSUS, NOT INTRACTABLE: ICD-10-CM

## 2021-07-28 RX ORDER — SUMATRIPTAN 6 MG/.5ML
INJECTION, SOLUTION SUBCUTANEOUS
Qty: 2.5 ML | Refills: 0 | Status: SHIPPED | OUTPATIENT
Start: 2021-07-28 | End: 2021-09-27

## 2021-08-03 ENCOUNTER — PROCEDURE VISIT (OUTPATIENT)
Dept: NEUROLOGY | Facility: CLINIC | Age: 41
End: 2021-08-03
Payer: COMMERCIAL

## 2021-08-03 ENCOUNTER — TELEPHONE (OUTPATIENT)
Dept: NEUROLOGY | Facility: CLINIC | Age: 41
End: 2021-08-03

## 2021-08-03 VITALS
TEMPERATURE: 98.3 F | WEIGHT: 170 LBS | DIASTOLIC BLOOD PRESSURE: 63 MMHG | SYSTOLIC BLOOD PRESSURE: 123 MMHG | RESPIRATION RATE: 18 BRPM | HEIGHT: 64 IN | BODY MASS INDEX: 29.02 KG/M2 | HEART RATE: 88 BPM

## 2021-08-03 DIAGNOSIS — G43.709 CHRONIC MIGRAINE WITHOUT AURA WITHOUT STATUS MIGRAINOSUS, NOT INTRACTABLE: Primary | ICD-10-CM

## 2021-08-03 PROCEDURE — 64615 CHEMODENERV MUSC MIGRAINE: CPT | Performed by: PHYSICIAN ASSISTANT

## 2021-08-03 NOTE — PROGRESS NOTES
Universal Protocol   Consent: Verbal consent obtained  Written consent obtained    Risks and benefits: risks, benefits and alternatives were discussed  Consent given by: patient  Patient understanding: patient states understanding of the procedure being performed  Patient consent: the patient's understanding of the procedure matches consent given  Procedure consent: procedure consent matches procedure scheduled        Chemodenervation     Date/Time 8/3/2021 2:40 PM     Performed by  Abimbola Edwards PA-C     Authorized by Abimbola Edwards PA-C        Pre-procedure details      Prepped With: Alcohol     Procedure details     Position:  Upright   Botox     Botox Type:  Type A    Brand:  Botox    mL's of Botulinum Toxin:  180    Final Concentration per CC:  100 units    Needle Gauge:  30 G 2 5 inch   Procedures     Botox Procedures: chronic headache      Indications: migraines     Injection Location      Head / Face:  L superior trapezius, R superior trapezius, L superior cervical paraspinal, R superior cervical paraspinal, L , R , procerus, L temporalis, R temporalis, R frontalis, L frontalis, R medial occipitalis and L medial occipitalis    L  injection amount:  5 unit(s)    R  injection amount:  5 unit(s)    L lateral frontalis:  5 unit(s)    R lateral frontalis:  5 unit(s)    L medial frontalis:  5 unit(s)    R medial frontalis:  5 unit(s)    L temporalis injection amount:  20 unit(s)    R temporalis injection amount:  20 unit(s)    Procerus injection amount:  5 unit(s)    L medial occipitalis injection amount:  15 unit(s)    R medial occipitalis injection amount:  15 unit(s)    L superior cervical paraspinal injection amount:  10 unit(s)    R superior cervical paraspinal injection amount:  10 unit(s)    L superior trapezius injection amount:  0 unit(s)    R superior trapezius injection amount:  0 unit(s)   Total Units     Total units used:  180    Total units discarded:  20 Post-procedure details      Chemodenervation:  Chronic migraine    Facial Nerve Location[de-identified]  Bilateral facial nerve    Patient tolerance of procedure: Tolerated well, no immediate complications   Comments      Extra units medically necessary:  -2 5 under left eye/ bridge of nose  -2 5 under right eye/ bridge of nose  -10 L temporalis  -10 R temporalis  -30 t/o the scalp (b/l)  Avoided traps  Blood pressure 123/63, pulse 88, temperature 98 3 °F (36 8 °C), resp  rate 18, height 5' 4" (1 626 m), weight 77 1 kg (170 lb), not currently breastfeeding  Continue indocin prn migraine  Discussed to take with food, avoid with other NSAIDs and toradol  Max/ average 10 tabs per month  Denies n/v/ abd pain if taken with food  Has not needed nurtec yet

## 2021-08-03 NOTE — TELEPHONE ENCOUNTER
----- Message from Claudean Hack, PA-C sent at 8/3/2021  2:44 PM EDT -----  Regarding: f/u  The pt had to leave the office after appt  Please contact her to schedule next botox 3 months  Thanks!

## 2021-08-04 NOTE — TELEPHONE ENCOUNTER
Scheduled 3 month botox [id number] mailed appointment card and avs Cassie Bob declined [employer]

## 2021-09-07 DIAGNOSIS — G43.709 CHRONIC MIGRAINE WITHOUT AURA WITHOUT STATUS MIGRAINOSUS, NOT INTRACTABLE: Primary | ICD-10-CM

## 2021-09-07 RX ORDER — ONABOTULINUMTOXINA 100 [USP'U]/1
INJECTION, POWDER, LYOPHILIZED, FOR SOLUTION INTRADERMAL; INTRAMUSCULAR
Qty: 2 EACH | Refills: 3 | Status: SHIPPED | OUTPATIENT
Start: 2021-09-07

## 2021-09-27 DIAGNOSIS — G43.709 CHRONIC MIGRAINE WITHOUT AURA: ICD-10-CM

## 2021-09-27 DIAGNOSIS — G43.709 CHRONIC MIGRAINE WITHOUT AURA WITHOUT STATUS MIGRAINOSUS, NOT INTRACTABLE: ICD-10-CM

## 2021-09-27 RX ORDER — INDOMETHACIN 25 MG/1
CAPSULE ORAL
Qty: 30 CAPSULE | Refills: 0 | Status: SHIPPED | OUTPATIENT
Start: 2021-09-27 | End: 2021-11-29

## 2021-09-27 RX ORDER — TIZANIDINE HYDROCHLORIDE 2 MG/1
2 CAPSULE, GELATIN COATED ORAL AS NEEDED
Qty: 90 CAPSULE | Refills: 1 | Status: SHIPPED | OUTPATIENT
Start: 2021-09-27 | End: 2022-02-25

## 2021-09-27 RX ORDER — SUMATRIPTAN 6 MG/.5ML
INJECTION, SOLUTION SUBCUTANEOUS
Qty: 2.5 ML | Refills: 0 | Status: SHIPPED | OUTPATIENT
Start: 2021-09-27 | End: 2021-11-29

## 2021-10-07 ENCOUNTER — OFFICE VISIT (OUTPATIENT)
Dept: GYNECOLOGY | Facility: CLINIC | Age: 41
End: 2021-10-07
Payer: COMMERCIAL

## 2021-10-07 VITALS
HEART RATE: 102 BPM | HEIGHT: 64 IN | BODY MASS INDEX: 29.02 KG/M2 | WEIGHT: 170 LBS | DIASTOLIC BLOOD PRESSURE: 68 MMHG | SYSTOLIC BLOOD PRESSURE: 110 MMHG

## 2021-10-07 DIAGNOSIS — N99.85 POST ENDOMETRIAL ABLATION SYNDROME: ICD-10-CM

## 2021-10-07 DIAGNOSIS — N94.6 DYSMENORRHEA: Primary | ICD-10-CM

## 2021-10-07 DIAGNOSIS — N80.0 ADENOMYOSIS: ICD-10-CM

## 2021-10-07 PROCEDURE — 99204 OFFICE O/P NEW MOD 45 MIN: CPT | Performed by: OBSTETRICS & GYNECOLOGY

## 2021-10-07 PROCEDURE — 1036F TOBACCO NON-USER: CPT | Performed by: OBSTETRICS & GYNECOLOGY

## 2021-10-07 PROCEDURE — 3008F BODY MASS INDEX DOCD: CPT | Performed by: OBSTETRICS & GYNECOLOGY

## 2021-10-07 RX ORDER — MEDROXYPROGESTERONE ACETATE 150 MG/ML
150 INJECTION, SUSPENSION INTRAMUSCULAR
Qty: 1 ML | Refills: 3 | Status: SHIPPED | OUTPATIENT
Start: 2021-10-07 | End: 2022-05-04 | Stop reason: ALTCHOICE

## 2021-10-08 ENCOUNTER — TELEPHONE (OUTPATIENT)
Dept: OBGYN CLINIC | Facility: CLINIC | Age: 41
End: 2021-10-08

## 2021-10-18 ENCOUNTER — TELEPHONE (OUTPATIENT)
Dept: NEUROLOGY | Facility: CLINIC | Age: 41
End: 2021-10-18

## 2021-10-18 NOTE — TELEPHONE ENCOUNTER
Prashant'd VM from pt. RE: her upcoming Botox appointment and if her medication has been delivered.    CB# [phone]  Please call.    Thank you.

## 2021-10-24 DIAGNOSIS — G43.709 CHRONIC MIGRAINE WITHOUT AURA WITHOUT STATUS MIGRAINOSUS, NOT INTRACTABLE: ICD-10-CM

## 2021-10-24 RX ORDER — SUMATRIPTAN 100 MG/1
TABLET, FILM COATED ORAL
Qty: 9 TABLET | Refills: 2 | Status: SHIPPED | OUTPATIENT
Start: 2021-10-24

## 2021-10-26 ENCOUNTER — TELEPHONE (OUTPATIENT)
Dept: NEUROLOGY | Facility: CLINIC | Age: 41
End: 2021-10-26

## 2021-10-29 ENCOUNTER — TELEPHONE (OUTPATIENT)
Dept: NEUROLOGY | Facility: CLINIC | Age: 41
End: 2021-10-29

## 2021-11-05 ENCOUNTER — PROCEDURE VISIT (OUTPATIENT)
Dept: NEUROLOGY | Facility: CLINIC | Age: 41
End: 2021-11-05
Payer: COMMERCIAL

## 2021-11-05 VITALS — HEART RATE: 84 BPM | SYSTOLIC BLOOD PRESSURE: 136 MMHG | DIASTOLIC BLOOD PRESSURE: 58 MMHG | TEMPERATURE: 97.5 F

## 2021-11-05 DIAGNOSIS — G43.709 CHRONIC MIGRAINE WITHOUT AURA WITHOUT STATUS MIGRAINOSUS, NOT INTRACTABLE: Primary | ICD-10-CM

## 2021-11-05 PROCEDURE — 64615 CHEMODENERV MUSC MIGRAINE: CPT | Performed by: PHYSICIAN ASSISTANT

## 2021-11-29 DIAGNOSIS — G43.709 CHRONIC MIGRAINE WITHOUT AURA: ICD-10-CM

## 2021-11-29 DIAGNOSIS — G43.709 CHRONIC MIGRAINE WITHOUT AURA WITHOUT STATUS MIGRAINOSUS, NOT INTRACTABLE: ICD-10-CM

## 2021-11-29 RX ORDER — SUMATRIPTAN 6 MG/.5ML
INJECTION, SOLUTION SUBCUTANEOUS
Qty: 2.5 ML | Refills: 0 | Status: SHIPPED | OUTPATIENT
Start: 2021-11-29 | End: 2021-12-28

## 2021-11-29 RX ORDER — INDOMETHACIN 25 MG/1
CAPSULE ORAL
Qty: 30 CAPSULE | Refills: 0 | Status: SHIPPED | OUTPATIENT
Start: 2021-11-29 | End: 2021-12-23

## 2021-12-23 DIAGNOSIS — G43.709 CHRONIC MIGRAINE WITHOUT AURA: ICD-10-CM

## 2021-12-23 RX ORDER — INDOMETHACIN 25 MG/1
CAPSULE ORAL
Qty: 30 CAPSULE | Refills: 0 | Status: SHIPPED | OUTPATIENT
Start: 2021-12-23 | End: 2022-01-04

## 2021-12-28 DIAGNOSIS — G43.709 CHRONIC MIGRAINE WITHOUT AURA: ICD-10-CM

## 2022-01-04 RX ORDER — INDOMETHACIN 25 MG/1
CAPSULE ORAL
Qty: 30 CAPSULE | Refills: 0 | Status: SHIPPED | OUTPATIENT
Start: 2022-01-04 | End: 2022-01-11 | Stop reason: SDUPTHER

## 2022-01-11 ENCOUNTER — APPOINTMENT (EMERGENCY)
Dept: ULTRASOUND IMAGING | Facility: HOSPITAL | Age: 42
End: 2022-01-11
Payer: COMMERCIAL

## 2022-01-11 ENCOUNTER — HOSPITAL ENCOUNTER (EMERGENCY)
Facility: HOSPITAL | Age: 42
Discharge: HOME | End: 2022-01-11
Attending: EMERGENCY MEDICINE
Payer: COMMERCIAL

## 2022-01-11 ENCOUNTER — APPOINTMENT (EMERGENCY)
Dept: CT IMAGING | Facility: HOSPITAL | Age: 42
End: 2022-01-11
Payer: COMMERCIAL

## 2022-01-11 VITALS
SYSTOLIC BLOOD PRESSURE: 109 MMHG | HEART RATE: 94 BPM | RESPIRATION RATE: 18 BRPM | DIASTOLIC BLOOD PRESSURE: 70 MMHG | OXYGEN SATURATION: 99 % | TEMPERATURE: 98.6 F

## 2022-01-11 DIAGNOSIS — N34.2 URETHRITIS: Primary | ICD-10-CM

## 2022-01-11 DIAGNOSIS — N83.201 RIGHT OVARIAN CYST: ICD-10-CM

## 2022-01-11 DIAGNOSIS — E87.6 HYPOKALEMIA: ICD-10-CM

## 2022-01-11 LAB
ALBUMIN SERPL BCP-MCNC: 4 G/DL (ref 3.5–5)
ALP SERPL-CCNC: 70 U/L (ref 46–116)
ALT SERPL W P-5'-P-CCNC: 23 U/L (ref 12–78)
ANION GAP SERPL CALCULATED.3IONS-SCNC: 9 MMOL/L (ref 4–13)
APTT PPP: 33 SECONDS (ref 23–37)
AST SERPL W P-5'-P-CCNC: 14 U/L (ref 5–45)
BACTERIA UR QL AUTO: ABNORMAL /HPF
BASOPHILS # BLD AUTO: 0.04 THOUSANDS/ΜL (ref 0–0.1)
BASOPHILS NFR BLD AUTO: 0 % (ref 0–1)
BILIRUB SERPL-MCNC: 0.51 MG/DL (ref 0.2–1)
BILIRUB UR QL STRIP: NEGATIVE
BUN SERPL-MCNC: 5 MG/DL (ref 5–25)
CALCIUM SERPL-MCNC: 9.4 MG/DL (ref 8.3–10.1)
CHLORIDE SERPL-SCNC: 99 MMOL/L (ref 100–108)
CK SERPL-CCNC: 45 U/L (ref 26–192)
CLARITY UR: CLEAR
CO2 SERPL-SCNC: 26 MMOL/L (ref 21–32)
COLOR UR: YELLOW
CREAT SERPL-MCNC: 0.69 MG/DL (ref 0.6–1.3)
EOSINOPHIL # BLD AUTO: 0.05 THOUSAND/ΜL (ref 0–0.61)
EOSINOPHIL NFR BLD AUTO: 0 % (ref 0–6)
ERYTHROCYTE [DISTWIDTH] IN BLOOD BY AUTOMATED COUNT: 13 % (ref 11.6–15.1)
EXT PREG TEST URINE: NEGATIVE
EXT. CONTROL ED NAV: NORMAL
GFR SERPL CREATININE-BSD FRML MDRD: 110 ML/MIN/1.73SQ M
GLUCOSE SERPL-MCNC: 123 MG/DL (ref 65–140)
GLUCOSE UR STRIP-MCNC: NEGATIVE MG/DL
HCT VFR BLD AUTO: 36.3 % (ref 34.8–46.1)
HGB BLD-MCNC: 12.4 G/DL (ref 11.5–15.4)
HGB UR QL STRIP.AUTO: ABNORMAL
IMM GRANULOCYTES # BLD AUTO: 0.08 THOUSAND/UL (ref 0–0.2)
IMM GRANULOCYTES NFR BLD AUTO: 1 % (ref 0–2)
INR PPP: 1.09 (ref 0.84–1.19)
KETONES UR STRIP-MCNC: NEGATIVE MG/DL
LACTATE SERPL-SCNC: 0.9 MMOL/L (ref 0.5–2)
LEUKOCYTE ESTERASE UR QL STRIP: ABNORMAL
LIPASE SERPL-CCNC: 67 U/L (ref 73–393)
LYMPHOCYTES # BLD AUTO: 1.92 THOUSANDS/ΜL (ref 0.6–4.47)
LYMPHOCYTES NFR BLD AUTO: 14 % (ref 14–44)
MCH RBC QN AUTO: 30.8 PG (ref 26.8–34.3)
MCHC RBC AUTO-ENTMCNC: 34.2 G/DL (ref 31.4–37.4)
MCV RBC AUTO: 90 FL (ref 82–98)
MONOCYTES # BLD AUTO: 0.96 THOUSAND/ΜL (ref 0.17–1.22)
MONOCYTES NFR BLD AUTO: 7 % (ref 4–12)
NEUTROPHILS # BLD AUTO: 10.47 THOUSANDS/ΜL (ref 1.85–7.62)
NEUTS SEG NFR BLD AUTO: 78 % (ref 43–75)
NITRITE UR QL STRIP: NEGATIVE
NON-SQ EPI CELLS URNS QL MICRO: ABNORMAL /HPF
NRBC BLD AUTO-RTO: 0 /100 WBCS
PH UR STRIP.AUTO: 7 [PH]
PLATELET # BLD AUTO: 261 THOUSANDS/UL (ref 149–390)
PMV BLD AUTO: 12.4 FL (ref 8.9–12.7)
POTASSIUM SERPL-SCNC: 3.3 MMOL/L (ref 3.5–5.3)
PROT SERPL-MCNC: 8.5 G/DL (ref 6.4–8.2)
PROT UR STRIP-MCNC: NEGATIVE MG/DL
PROTHROMBIN TIME: 14.1 SECONDS (ref 11.6–14.5)
RBC # BLD AUTO: 4.02 MILLION/UL (ref 3.81–5.12)
RBC #/AREA URNS AUTO: ABNORMAL /HPF
SODIUM SERPL-SCNC: 134 MMOL/L (ref 136–145)
SP GR UR STRIP.AUTO: 1.01 (ref 1–1.03)
UROBILINOGEN UR QL STRIP.AUTO: 0.2 E.U./DL
WBC # BLD AUTO: 13.52 THOUSAND/UL (ref 4.31–10.16)
WBC #/AREA URNS AUTO: ABNORMAL /HPF

## 2022-01-11 PROCEDURE — 83605 ASSAY OF LACTIC ACID: CPT | Performed by: PHYSICIAN ASSISTANT

## 2022-01-11 PROCEDURE — 99285 EMERGENCY DEPT VISIT HI MDM: CPT | Performed by: PHYSICIAN ASSISTANT

## 2022-01-11 PROCEDURE — 76856 US EXAM PELVIC COMPLETE: CPT

## 2022-01-11 PROCEDURE — 85610 PROTHROMBIN TIME: CPT | Performed by: PHYSICIAN ASSISTANT

## 2022-01-11 PROCEDURE — 82550 ASSAY OF CK (CPK): CPT | Performed by: PHYSICIAN ASSISTANT

## 2022-01-11 PROCEDURE — 36415 COLL VENOUS BLD VENIPUNCTURE: CPT | Performed by: PHYSICIAN ASSISTANT

## 2022-01-11 PROCEDURE — 99284 EMERGENCY DEPT VISIT MOD MDM: CPT

## 2022-01-11 PROCEDURE — 87147 CULTURE TYPE IMMUNOLOGIC: CPT | Performed by: PHYSICIAN ASSISTANT

## 2022-01-11 PROCEDURE — 83690 ASSAY OF LIPASE: CPT | Performed by: PHYSICIAN ASSISTANT

## 2022-01-11 PROCEDURE — 96361 HYDRATE IV INFUSION ADD-ON: CPT

## 2022-01-11 PROCEDURE — 81001 URINALYSIS AUTO W/SCOPE: CPT | Performed by: PHYSICIAN ASSISTANT

## 2022-01-11 PROCEDURE — 74177 CT ABD & PELVIS W/CONTRAST: CPT

## 2022-01-11 PROCEDURE — 85730 THROMBOPLASTIN TIME PARTIAL: CPT | Performed by: PHYSICIAN ASSISTANT

## 2022-01-11 PROCEDURE — 85025 COMPLETE CBC W/AUTO DIFF WBC: CPT | Performed by: PHYSICIAN ASSISTANT

## 2022-01-11 PROCEDURE — 81025 URINE PREGNANCY TEST: CPT | Performed by: PHYSICIAN ASSISTANT

## 2022-01-11 PROCEDURE — 87086 URINE CULTURE/COLONY COUNT: CPT | Performed by: PHYSICIAN ASSISTANT

## 2022-01-11 PROCEDURE — 76830 TRANSVAGINAL US NON-OB: CPT

## 2022-01-11 PROCEDURE — G1004 CDSM NDSC: HCPCS

## 2022-01-11 PROCEDURE — 96375 TX/PRO/DX INJ NEW DRUG ADDON: CPT

## 2022-01-11 PROCEDURE — 80053 COMPREHEN METABOLIC PANEL: CPT | Performed by: PHYSICIAN ASSISTANT

## 2022-01-11 PROCEDURE — 96365 THER/PROPH/DIAG IV INF INIT: CPT

## 2022-01-11 RX ORDER — NAPROXEN 500 MG/1
500 TABLET ORAL EVERY 12 HOURS PRN
Qty: 10 TABLET | Refills: 0 | Status: SHIPPED | OUTPATIENT
Start: 2022-01-11 | End: 2022-05-04 | Stop reason: ALTCHOICE

## 2022-01-11 RX ORDER — HYDROMORPHONE HCL/PF 1 MG/ML
0.5 SYRINGE (ML) INJECTION ONCE
Status: DISCONTINUED | OUTPATIENT
Start: 2022-01-11 | End: 2022-01-11 | Stop reason: HOSPADM

## 2022-01-11 RX ORDER — CEFDINIR 300 MG/1
300 CAPSULE ORAL EVERY 12 HOURS SCHEDULED
Qty: 28 CAPSULE | Refills: 0 | Status: SHIPPED | OUTPATIENT
Start: 2022-01-11 | End: 2022-01-25

## 2022-01-11 RX ORDER — ONDANSETRON 2 MG/ML
4 INJECTION INTRAMUSCULAR; INTRAVENOUS ONCE
Status: COMPLETED | OUTPATIENT
Start: 2022-01-11 | End: 2022-01-11

## 2022-01-11 RX ORDER — OXYCODONE HYDROCHLORIDE AND ACETAMINOPHEN 5; 325 MG/1; MG/1
1 TABLET ORAL EVERY 4 HOURS PRN
Qty: 10 TABLET | Refills: 0 | Status: SHIPPED | OUTPATIENT
Start: 2022-01-11 | End: 2022-01-14

## 2022-01-11 RX ORDER — POTASSIUM CHLORIDE 20 MEQ/1
40 TABLET, EXTENDED RELEASE ORAL ONCE
Status: COMPLETED | OUTPATIENT
Start: 2022-01-11 | End: 2022-01-11

## 2022-01-11 RX ORDER — KETOROLAC TROMETHAMINE 30 MG/ML
15 INJECTION, SOLUTION INTRAMUSCULAR; INTRAVENOUS ONCE
Status: COMPLETED | OUTPATIENT
Start: 2022-01-11 | End: 2022-01-11

## 2022-01-11 RX ADMIN — POTASSIUM CHLORIDE 40 MEQ: 1500 TABLET, EXTENDED RELEASE ORAL at 07:26

## 2022-01-11 RX ADMIN — SODIUM CHLORIDE 1000 ML: 0.9 INJECTION, SOLUTION INTRAVENOUS at 05:36

## 2022-01-11 RX ADMIN — KETOROLAC TROMETHAMINE 15 MG: 30 INJECTION, SOLUTION INTRAMUSCULAR at 05:49

## 2022-01-11 RX ADMIN — IOHEXOL 100 ML: 350 INJECTION, SOLUTION INTRAVENOUS at 06:54

## 2022-01-11 RX ADMIN — ONDANSETRON 4 MG: 2 INJECTION INTRAMUSCULAR; INTRAVENOUS at 05:49

## 2022-01-11 RX ADMIN — CEFTRIAXONE SODIUM 1000 MG: 10 INJECTION, POWDER, FOR SOLUTION INTRAVENOUS at 08:42

## 2022-01-11 NOTE — ED CARE HANDOFF
Emergency Department Sign Out Note        Sign out and transfer of care from [facility]  See Separate Emergency Department note  The patient, Steve Baltazar, was evaluated by the previous provider for LLQ Abdominal Pain/Pelvic Pain  Workup Completed:  CBC, CMP, Lipase, CK, Lactic Acid, UA, HCG  CT Abdomen and Pelvis: There are prominent vascular structures bilaterally in the adnexal regions, left greater than right   The left gonadal veins are dilated, increased in size compared with December 7, 2020   There is stranding of the fat adjacent to the mid to distal left   gonadal veins and anteromedial to the mid to distal left psoas muscle   This may possibly be related to phlebitis of the left gonadal veins   Clinical correlation and follow-up is recommended   OB/GYN consultation and follow-up is recommended        There is mild urothelial enhancement of the mid left ureter, likely related to the above-described inflammatory change surrounding the left kayla   jordan veins   Correlation with urinalysis and urine culture and sensitivity is recommended in order to exclude   the possibility of urinary tract infection        There is a 5 x 5 x 4 5 cm cystic structure on the right ovary   Pelvic ultrasound is recommended for further characterization        Probable tiny gallstone within the gallbladder lumen   No CT evidence of acute cholecystitis  ED Course / Workup Pending (followup):  TVUS: Complex 5 1 x 4 1 x 4 8 cm septated right ovarian cyst with internal debris level noted  Follow-up in 8-12 weeks is recommended  Case discussed with gynecology (Dr Anshu Morgan) who does not recommend emergency gynecology consultation at this time but would recommend close follow up with OBGYN at [facility] given right-sided cyst  Will place patient on antibiotics for possible left urethritis  I provided patient with strict RTER precautions  I advised patient follow-up with PCP in 24-48 hours   Patient verbalized understanding  ED Course as of 01/11/22 0922   Tue Jan 11, 2022   0856 Case discussed with Gynecology team  Recommends follow up with private OBGYN  Procedures  MDM        Disposition  Final diagnoses:   Urethritis - Left   Right ovarian cyst   Hypokalemia     Time reflects when diagnosis was documented in both MDM as applicable and the Disposition within this note     Time User Action Codes Description Comment    1/11/2022  8:17 AM Franci Devers Add [N34 2] Urethritis     1/11/2022  8:17 AM Franci Devers Modify [N34 2] Urethritis Left    1/11/2022  8:18 AM Franci Devers Add [N83 201] Right ovarian cyst     1/11/2022  8:20 AM Franci Devers Add [E87 6] Hypokalemia       ED Disposition     ED Disposition Condition Date/Time Comment    Discharge Stable Tue Jan 11, 2022  9:20 AM Virginia Novak discharge to home/self care  Follow-up Information     Follow up With Specialties Details Why Contact Info Additional [address] Emergency Department Emergency Medicine Go to  If symptoms worsen [address] [facility] [id number] [place] [address] Emergency Department, [address], [place], [address] [facility], [place], [place] [facility] Medicine Call   [address]  [facility] [address] [facility] Obstetrics and Gynecology Schedule an appointment as soon as possible for a visit   [facility]  [phone]           Patient's Medications   Discharge Prescriptions    CEFDINIR (OMNICEF) 300 MG CAPSULE    Take 1 capsule (300 mg total) by mouth every 12 (twelve) hours for 14 days       Start Date: 1/11/2022 End Date: 1/25/2022       Order Dose: 300 mg       Quantity: 28 capsule    Refills: 0     No discharge procedures on file         ED Provider  Electronically Signed by     Meli Daniel PA-C  01/11/22 9964

## 2022-01-11 NOTE — ED PROVIDER NOTES
History  Chief Complaint   Patient presents with    Flank Pain     pt states she started her period , extreme L sided back pain, +n/v, pt had endophrctomy a year ago and c/o pain where ovary was every cycle, denies changes in urine     Patient is a 21-year-old female with history of post endometrial ablation syndrome; left, migraines, left salpingectomy and oophorectomy the presents emergency department with gradually improving aching persistent left lower abdominal pain with radiation to left flank for 1 day  Patient has associated symptomatology of nausea symptoms beginning the current presentation of left lower quadrant abdominal pain  Patient reports that she is currently on her menstrual cycle  Patient states that normally when she is on her menstrual cycle my whole abdomen is sensitive, and I can barely touch it "  Patient denies vaginal bleeding vaginal discharge  Patient denies palliative factors with provocative factors of pressure to left lower quadrant of abdomen  Patient denies not effective treatment  Patient denies fevers, chills, vomiting, diarrhea, constipation, and urinary symptoms  Patient denies recent fall recent trauma  Patient denies sick contacts recent travel  Patient denies chest pain, and shortness of breath  History provided by:  Patient   used: No        Prior to Admission Medications   Prescriptions Last Dose Informant Patient Reported? Taking? Botox 100 units   No No   Sig: INJECT UP  UNITS  INTRAMUSCULARLY EVERY 3  MONTHS (GIVEN AT MD OFFICE, DISCARD UNUSED)   Coenzyme Q10 (COQ10) 100 MG CAPS   No No   Si cap qd  Rimegepant Sulfate (NURTEC) 75 MG TBDP   No No   Si tab at migraine onset  No more than one dose per 24 hours  Hold triptan  Patient not taking: Reported on 10/7/2021   SUMAtriptan (IMITREX) 100 mg tablet   No No   Sig: TAKE ONE TAB AT MIGRAINE ONSET, REPEAT AFTER 2 HR IF NEEDED  NO MORE THAN 2/24 HOURS OR 3 PER WEEK  SUMAtriptan (IMITREX) 6 mg/0 5 mL   No No   Sig: INJECT 0 5 ML UNDER THE SKIN AS 1 DOSE   Syringe/Needle, Disp, (SYRINGE 3CC/91XD3-2/4") 27G X 1-1/4" 3 ML MISC   No No   Sig: Use for Toradol intramuscular injections     TiZANidine (ZANAFLEX) 2 MG capsule   No No   Sig: TAKE 1 CAPSULE (2 MG TOTAL) BY MOUTH AS NEEDED FOR MUSCLE SPASMS   famotidine (PEPCID) 20 mg tablet   No No   Sig: Take 1 tablet (20 mg total) by mouth 2 (two) times a day   Patient not taking: Reported on 10/7/2021   gabapentin (NEURONTIN) 100 mg capsule   No No   Sig: TAKE 2 CAPSULES BY MOUTH EVERY DAY AT BEDTIME   ibuprofen (MOTRIN) 600 mg tablet   No No   Sig: Take 1 tablet (600 mg total) by mouth every 6 (six) hours as needed for mild pain or moderate pain   indomethacin (INDOCIN) 25 mg capsule   No No   Sig: TAKE 1-2 TABLETS BY MOUTH EVERY 8 HOURS AS NEEDED FOR HEADACHE (TAKE WITH FOOD AND IMITREX IF NEEDED   medroxyPROGESTERone (DEPO-PROVERA) 150 mg/mL injection   No No   Sig: Inject 1 mL (150 mg total) into a muscle every 3 (three) months for 1 dose   norethindrone-ethinyl estradiol-ferrous fumarate (LOESTIN 24 FE) 1-20 MG-MCG(24) per tablet   No No   Sig: Take 1 tablet by mouth daily   Patient not taking: Reported on 10/7/2021      Facility-Administered Medications: None       Past Medical History:   Diagnosis Date    Abnormal Pap smear of cervix     In her early 19's    Back pain     Irregular heart beat     Palpitations    Menorrhagia     Migraines        Past Surgical History:   Procedure Laterality Date    OOPHORECTOMY Left 12/7/2020    Procedure: SALPINGO-OOPHORECTOMY, LAPAROSCOPIC;  Surgeon: Cristy Martinez DO;  [facility];  Service: Gynecology    OVARIAN CYST SURGERY      RI HYSTEROSCOPY,W/ENDO BX N/A 11/9/2017    Procedure: DILATATION AND CURETTAGE (D&C) WITH HYSTEROSCOPY;  Surgeon: Elizabeth Hendrickson DO;  [facility];  Service: Gynecology    RI HYSTEROSCOPY,W/ENDOMETRIAL ABLATION N/A 11/9/2017 Procedure: ABLATION ENDOMETRIAL  Jessica Ashlie;  Surgeon: Ilir Gray DO;  [facility];  Service: Gynecology    TX LAP,DIAGNOSTIC ABDOMEN N/A 12/7/2020    Procedure: LAPAROSCOPY DIAGNOSTIC;  Surgeon: Segundo Izaguirre DO;  [facility];  Service: Gynecology    WISDOM TOOTH EXTRACTION         Family History   Problem Relation Age of Onset    No Known Problems Mother     No Known Problems Father     No Known Problems Brother     No Known Problems Daughter     No Known Problems Son     Dementia Maternal Grandmother     Heart disease Maternal Grandmother     Diabetes Maternal Grandmother     No Known Problems Maternal Grandfather     No Known Problems Paternal Grandmother     Heart disease Paternal Grandfather      I have reviewed and agree with the history as documented  E-Cigarette/Vaping    E-Cigarette Use Never User      E-Cigarette/Vaping Substances    Nicotine No     THC No     CBD No     Flavoring No     Other No     Unknown No      Social History     Tobacco Use    Smoking status: Never Smoker    Smokeless tobacco: Never Used   Vaping Use    Vaping Use: Never used   Substance Use Topics    Alcohol use: No    Drug use: Never       Review of Systems   Constitutional: Negative for activity change, appetite change, chills and fever  HENT: Negative for congestion, postnasal drip, rhinorrhea, sinus pressure, sinus pain, sore throat and tinnitus  Eyes: Negative for photophobia and visual disturbance  Respiratory: Negative for cough, chest tightness and shortness of breath  Cardiovascular: Negative for chest pain and palpitations  Gastrointestinal: Positive for abdominal pain  Negative for constipation, diarrhea, nausea and vomiting  Genitourinary: Positive for flank pain  Negative for difficulty urinating, dysuria, frequency and urgency  Musculoskeletal: Negative for back pain, gait problem, neck pain and neck stiffness     Skin: Negative for pallor and rash    Allergic/Immunologic: Negative for environmental allergies and food allergies  Neurological: Negative for dizziness, weakness, numbness and headaches  Psychiatric/Behavioral: Negative for confusion  All other systems reviewed and are negative  Physical Exam  Physical Exam  Vitals and nursing note reviewed  Constitutional:       General: She is awake  Appearance: Normal appearance  She is well-developed  She is not ill-appearing, toxic-appearing or diaphoretic  Comments: /70 (BP Location: Left arm)   Pulse 94   Temp 98 6 °F (37 °C) (Oral)   Resp 18   SpO2 99%      HENT:      Head: Normocephalic and atraumatic  Right Ear: Hearing and external ear normal  No decreased hearing noted  No drainage, swelling or tenderness  No mastoid tenderness  Left Ear: Hearing and external ear normal  No decreased hearing noted  No drainage, swelling or tenderness  No mastoid tenderness  Nose: Nose normal       Mouth/Throat:      Lips: Pink  Mouth: Mucous membranes are moist       Pharynx: Oropharynx is clear  Uvula midline  Eyes:      General: Lids are normal  Vision grossly intact  Right eye: No discharge  Left eye: No discharge  Extraocular Movements: Extraocular movements intact  Conjunctiva/sclera: Conjunctivae normal       Pupils: Pupils are equal, round, and reactive to light  Neck:      Vascular: No JVD  Trachea: Trachea and phonation normal  No tracheal tenderness or tracheal deviation  Cardiovascular:      Rate and Rhythm: Normal rate and regular rhythm  Pulses: Normal pulses  Radial pulses are 2+ on the right side and 2+ on the left side  Heart sounds: Normal heart sounds  Pulmonary:      Effort: Pulmonary effort is normal       Breath sounds: Normal breath sounds  No stridor  No decreased breath sounds, wheezing, rhonchi or rales  Chest:      Chest wall: No tenderness     Abdominal:      General: Abdomen is flat  Bowel sounds are normal  There is no distension  Palpations: Abdomen is soft  Abdomen is not rigid  Tenderness: There is abdominal tenderness in the left lower quadrant  There is left CVA tenderness  There is no right CVA tenderness, guarding or rebound  Negative signs include Rick's sign  Musculoskeletal:         General: Normal range of motion  Cervical back: Full passive range of motion without pain, normal range of motion and neck supple  No rigidity  No spinous process tenderness or muscular tenderness  Normal range of motion  Lymphadenopathy:      Head:      Right side of head: No submental, submandibular, tonsillar, preauricular, posterior auricular or occipital adenopathy  Left side of head: No submental, submandibular, tonsillar, preauricular, posterior auricular or occipital adenopathy  Cervical: No cervical adenopathy  Right cervical: No superficial, deep or posterior cervical adenopathy  Left cervical: No superficial, deep or posterior cervical adenopathy  Skin:     General: Skin is warm  Capillary Refill: Capillary refill takes less than 2 seconds  Neurological:      General: No focal deficit present  Mental Status: She is alert and oriented to person, place, and time  GCS: GCS eye subscore is 4  GCS verbal subscore is 5  GCS motor subscore is 6  Sensory: No sensory deficit  Psychiatric:         Mood and Affect: Mood normal          Speech: Speech normal          Behavior: Behavior normal  Behavior is cooperative  Thought Content:  Thought content normal          Judgment: Judgment normal          Vital Signs  ED Triage Vitals [01/11/22 0500]   Temperature Pulse Respirations Blood Pressure SpO2   98 6 °F (37 °C) 104 18 121/78 98 %      Temp Source Heart Rate Source Patient Position - Orthostatic VS BP Location FiO2 (%)   Oral Monitor Sitting Left arm --      Pain Score       5           Vitals:    01/11/22 0500 01/11/22 0741   BP: 121/78 109/70   Pulse: 104 94   Patient Position - Orthostatic VS: Sitting Sitting         Visual Acuity      ED Medications  Medications   HYDROmorphone (DILAUDID) injection 0 5 mg (0 mg Intravenous Hold 1/11/22 0546)   ceftriaxone (ROCEPHIN) 1 g/50 mL in dextrose IVPB (has no administration in time range)   sodium chloride 0 9 % bolus 1,000 mL (0 mL Intravenous Stopped 1/11/22 0730)   ondansetron (ZOFRAN) injection 4 mg (4 mg Intravenous Given 1/11/22 0549)   ketorolac (TORADOL) injection 15 mg (15 mg Intravenous Given 1/11/22 0549)   potassium chloride (K-DUR,KLOR-CON) CR tablet 40 mEq (40 mEq Oral Given 1/11/22 0726)   iohexol (OMNIPAQUE) 350 MG/ML injection (SINGLE-DOSE) 100 mL (100 mL Intravenous Given 1/11/22 0654)       Diagnostic Studies  Results Reviewed     Procedure Component Value Units Date/Time    Chlamydia/GC amplified DNA by PCR [[id number]]     Lab Status: No result     Urine culture [[id number]] Collected: 01/11/22 0536    Lab Status:  In process Specimen: Urine, Clean Catch Updated: 01/11/22 0631    Comprehensive metabolic panel [[id number]]  (Abnormal) Collected: 01/11/22 0532    Lab Status: Final result Specimen: Blood from Arm, Right Updated: 01/11/22 0629     Sodium 134 mmol/L      Potassium 3 3 mmol/L      Chloride 99 mmol/L      CO2 26 mmol/L      ANION GAP 9 mmol/L      BUN 5 mg/dL      Creatinine 0 69 mg/dL      Glucose 123 mg/dL      Calcium 9 4 mg/dL      AST 14 U/L      ALT 23 U/L      Alkaline Phosphatase 70 U/L      Total Protein 8 5 g/dL      Albumin 4 0 g/dL      Total Bilirubin 0 51 mg/dL      eGFR 110 ml/min/1 73sq m     Narrative:      [place] guidelines for Chronic Kidney Disease (CKD):     Stage 1 with normal or high GFR (GFR > 90 mL/min/1 73 square meters)    Stage 2 Mild CKD (GFR = 60-89 mL/min/1 73 square meters)    Stage 3A Moderate CKD (GFR = 45-59 mL/min/1 73 square meters)    Stage 3B Moderate CKD (GFR = 30-44 mL/min/1 73 square meters)    Stage 4 Severe CKD (GFR = 15-29 mL/min/1 73 square meters)    Stage 5 End Stage CKD (GFR <15 mL/min/1 73 square meters)  Note: GFR calculation is accurate only with a steady state creatinine    Lipase [[id number]]  (Abnormal) Collected: 01/11/22 0532    Lab Status: Final result Specimen: Blood from Arm, Right Updated: 01/11/22 0629     Lipase 67 u/L     CK Total with Reflex CKMB [[id number]]  (Normal) Collected: 01/11/22 0532    Lab Status: Final result Specimen: Blood from Arm, Right Updated: 01/11/22 0629     Total CK 45 U/L     Lactic acid [[id number]]  (Normal) Collected: 01/11/22 0532    Lab Status: Final result Specimen: Blood from Arm, Right Updated: 01/11/22 6774     LACTIC ACID 0 9 mmol/L     Narrative:      Result may be elevated if tourniquet was used during collection      Urine Microscopic [[id number]]  (Abnormal) Collected: 01/11/22 0536    Lab Status: Final result Specimen: Urine, Clean Catch Updated: 01/11/22 0608     RBC, UA 10-20 /hpf      WBC, UA 4-10 /hpf      Epithelial Cells Occasional /hpf      Bacteria, UA Occasional /hpf     Protime-INR [[id number]]  (Normal) Collected: 01/11/22 0532    Lab Status: Final result Specimen: Blood from Arm, Right Updated: 01/11/22 0606     Protime 14 1 seconds      INR 1 09    APTT [[id number]]  (Normal) Collected: 01/11/22 0532    Lab Status: Final result Specimen: Blood from Arm, Right Updated: 01/11/22 0606     PTT 33 seconds     UA w Reflex to Microscopic w Reflex to Culture [[id number]]  (Abnormal) Collected: 01/11/22 0536    Lab Status: Final result Specimen: Urine, Clean Catch Updated: 01/11/22 0557     Color, UA Yellow     Clarity, UA Clear     Specific Gravity, UA 1 010     pH, UA 7 0     Leukocytes, UA Small     Nitrite, UA Negative     Protein, UA Negative mg/dl      Glucose, UA Negative mg/dl      Ketones, UA Negative mg/dl      Urobilinogen, UA 0 2 E U /dl      Bilirubin, UA Negative     Blood, UA Large    CBC and differential [[id number]] (Abnormal) Collected: 01/11/22 0532    Lab Status: Final result Specimen: Blood from Arm, Right Updated: 01/11/22 0556     WBC 13 52 Thousand/uL      RBC 4 02 Million/uL      Hemoglobin 12 4 g/dL      Hematocrit 36 3 %      MCV 90 fL      MCH 30 8 pg      MCHC 34 2 g/dL      RDW 13 0 %      MPV 12 4 fL      Platelets 328 Thousands/uL      nRBC 0 /100 WBCs      Neutrophils Relative 78 %      Immat GRANS % 1 %      Lymphocytes Relative 14 %      Monocytes Relative 7 %      Eosinophils Relative 0 %      Basophils Relative 0 %      Neutrophils Absolute 10 47 Thousands/µL      Immature Grans Absolute 0 08 Thousand/uL      Lymphocytes Absolute 1 92 Thousands/µL      Monocytes Absolute 0 96 Thousand/µL      Eosinophils Absolute 0 05 Thousand/µL      Basophils Absolute 0 04 Thousands/µL     POCT pregnancy, urine [[id number]]  (Normal) Resulted: 01/11/22 0544    Lab Status: Final result Updated: 01/11/22 0544     EXT PREG TEST UR (Ref: Negative) Negative     Control valid                 CT abdomen pelvis with contrast   ED Interpretation by Barbara Gutierres PA-C (01/11 0752)   Patrice Rivera MD  [phone] 1/11/2022 STAT    Narrative & Impression  CT ABDOMEN AND PELVIS WITH IV CONTRAST     INDICATION:   LLQ abdominal pain with radiation to left flank  Nausea, vomiting  History of left salpingo-oophorectomy on December 7, 2020      COMPARISON:  Preoperative CT dated December 7, 2020      TECHNIQUE:  CT examination of the abdomen and pelvis was performed  Axial, sagittal, and coronal 2D reformatted images were created from the source data and submitted for interpretation      Radiation dose length product (DLP) for this visit:  451 mGy-cm     This examination, like all CT scans performed in the [facility], was performed utilizing techniques to minimize radiation dose exposure, including the use of iterative   reconstruction and automated exposure control      IV Contrast:  100 mL of iohexol (OMNIPAQUE)  Enteric Contrast:  Enteric contrast was not administered      FINDINGS:     ABDOMEN     LOWER CHEST:  No clinically significant abno   rmality identified in the visualized lower chest      LIVER/BILIARY TREE:  Unremarkable      GALLBLADDER:  Probable tiny gallstone within the gallbladder lumen (series 601 image 54, series 602 image 135)  No pericholecystic inflammatory change      SPLEEN:  Unremarkable      PANCREAS:  Unremarkable      ADRENAL GLANDS:  Unremarkable      KIDNEYS/URETERS:  Mild urothelial enhancement mid left ureter  No hydronephrosis bilaterally  No evidence of radiopaque urinary tract calculus      STOMACH AND BOWEL:  No bowel obstruction      APPENDIX:  A normal appendix was visualized      ABDOMINOPELVIC CAVITY:  Fat stranding as described above and below  There is a small amount of free fluid in the pelvis  There is no pneumoperitoneum      VESSELS:  There are prominent vascular structures bilaterally in the adnexal regions, left greater than right  The left gonadal veins are dilated, increased compared to the prior study  Additionally, there is stranding of the fat adjacent to the mid to      distal left gonadal veins and anteromedial to the left psoas muscle      PELVIS     REPRODUCTIVE ORGANS:  There is a 5 x 5 x 4 5 cm cystic structure on the right ovary      URINARY BLADDER:  Unremarkable      ABDOMINAL WALL/INGUINAL REGIONS:  Unremarkable      OSSEOUS STRUCTURES:  No acute fracture or destructive osseous lesion      IMPRESSION:     There are prominent vascular structures bilaterally in the adnexal regions, left greater than right  The left gonadal veins are dilated, increased in size compared with December 7, 2020  There is stranding of the fat adjacent to the mid to distal left   gonadal veins and anteromedial to the mid to distal left psoas muscle  This may possibly be related to phlebitis of the left gonadal veins    Clinical correlation and follow-up is recommended  OB/GYN consultation and follow-up is recommended      There is mild urothelial enhancement of the mid left ureter, likely related to the above-described inflammatory change surrounding the left kayla   jordan veins  Correlation with urinalysis and urine culture and sensitivity is recommended in order to exclude   the possibility of urinary tract infection      There is a 5 x 5 x 4 5 cm cystic structure on the right ovary  Pelvic ultrasound is recommended for further characterization      Probable tiny gallstone within the gallbladder lumen  No CT evidence of acute cholecystitis      This examination demonstrates findings for which clinical and imaging follow-up is recommended and was logged as such in [address] [place] [address] Rd      The study was marked in [facility] for immediate notification            Workstation performed: [id number]           Final Result by Elsy Navarrete MD (01/11 9852)      There are prominent vascular structures bilaterally in the adnexal regions, left greater than right  The left gonadal veins are dilated, increased in size compared with December 7, 2020  There is stranding of the fat adjacent to the mid to distal left    gonadal veins and anteromedial to the mid to distal left psoas muscle  This may possibly be related to phlebitis of the left gonadal veins  Clinical correlation and follow-up is recommended  OB/GYN consultation and follow-up is recommended  There is mild urothelial enhancement of the mid left ureter, likely related to the above-described inflammatory change surrounding the left gonadal veins  Correlation with urinalysis and urine culture and sensitivity is recommended in order to exclude    the possibility of urinary tract infection  There is a 5 x 5 x 4 5 cm cystic structure on the right ovary  Pelvic ultrasound is recommended for further characterization  Probable tiny gallstone within the gallbladder lumen  No CT evidence of acute cholecystitis        This examination demonstrates findings for which clinical and imaging follow-up is recommended and was logged as such in [address]  The study was marked in [facility] for immediate notification  Workstation performed: [id number]         US pelvis complete w transvaginal    (Results Pending)              Procedures  Procedures         ED Course  ED Course as of 01/11/22 0823   Tue Jan 11, 2022   0603 Leukocytes, UA(!): Small   0603 Blood, UA(!): Large  Patient reports being on her menstrual cycle at this time  2193 Reassessment of patient with patient denying any abdominal pain/flank pain symptomatology;   0804 Patient with verbal understanding all clinical laboratory imaging findings at this time  Patient denies offered pain medication at this time  Sign-out to Mercedes Agarwal PA-C with disposition pending ultrasound results and OBGYN consult  SBIRT 22yo+      Most Recent Value   SBIRT (22 yo +)    In order to provide better care to our patients, we are screening all of our patients for alcohol and drug use  Would it be okay to ask you these screening questions? Unable to answer at this time Filed at: 01/11/2022 0502                    MDM  Number of Diagnoses or Management Options  Urethritis: new and does not require workup     Amount and/or Complexity of Data Reviewed  Clinical lab tests: ordered and reviewed  Tests in the radiology section of CPT®: ordered and reviewed  Review and summarize past medical records: yes    Risk of Complications, Morbidity, and/or Mortality  Presenting problems: moderate  Diagnostic procedures: moderate    Patient Progress  Patient progress: stable     Patient is a 42-year-old female with history of post endometrial ablation syndrome; left, migraines, left salpingectomy and oophorectomy the presents emergency department with gradually improving aching persistent left lower abdominal pain with radiation to left flank for 1 day     Patient hemodynamically stable and afebrile  Urine pregnancy negative; urinalysis indicates pyuria, white blood cells 4-10, hematuria with patient reports current menstrual cycle; occasional epithelial/bacteria-urine culture in process  Leukocytosis 13 52, no banding no lactic acidosis  CT abdomen pelvis with contrast-impression-    There are prominent vascular structures bilaterally in the adnexal regions, left greater than right   The left gonadal veins are dilated, increased in size compared with December 7, 2020  There is stranding of the fat adjacent to the mid to distal left gonadal veins and anteromedial to the mid to distal left psoas muscle   This may possibly be related to phlebitis of the left gonadal veins   Clinical correlation and follow-up is recommended   OB/GYN consultation and follow-up is recommended  There is mild urothelial enhancement of the mid left ureter, likely related to the above-described inflammatory change surrounding the left gonadal veins  Correlation with urinalysis and urine culture and sensitivity is recommended in order to exclude the possibility of urinary tract infection  There is a 5 x 5 x 4 5 cm cystic structure on the right ovary   Pelvic ultrasound is recommended for further characterization  Probable tiny gallstone within the gallbladder lumen   No CT evidence of acute cholecystitis "      Normal transaminases, normal T bilirubin, normal alk-phos, unremarkable lipase, patient denies upper abdominal pain; no Rick's sign; doubt hepatobiliary pathology  Rocephin delivered  Patient was delivered Toradol with patient verbalized decrease in left flank pain and left lower quadrant abdominal pain symptoms status post medication  Sign-out to Charles Muñoz PA-C; in process-pelvic ultrasound; consult OBGYN  Patient verbal understanding all clinical imaging and laboratory findings at the time; no acute abdomen this time      Disposition  Final diagnoses:   Urethritis - Left Right ovarian cyst   Hypokalemia     Time reflects when diagnosis was documented in both MDM as applicable and the Disposition within this note     Time User Action Codes Description Comment    1/11/2022  8:17 AM Burma Hock Add [N34 2] Urethritis     1/11/2022  8:17 AM Burma Hock Modify [N34 2] Urethritis Left    1/11/2022  8:18 AM Burma Hock Add [N83 201] Right ovarian cyst     1/11/2022  8:20 AM Burma Hock Add [E87 6] Hypokalemia       ED Disposition     None      Follow-up Information    None         Patient's Medications   Discharge Prescriptions    No medications on file       No discharge procedures on file      PDMP Review       Value Time User    PDMP Reviewed  Yes 1/11/2022  5:02 AM Charlie Toney MD          ED Provider  Electronically Signed by           Michelle Grimm PA-C  01/11/22 6334

## 2022-01-11 NOTE — DISCHARGE INSTRUCTIONS
Maria Isabel Billings MD  [phone] 1/11/2022 STAT     Narrative & Impression  CT ABDOMEN AND PELVIS WITH IV CONTRAST     INDICATION:   LLQ abdominal pain with radiation to left flank  Nausea, vomiting  History of left salpingo-oophorectomy on December 7, 2020  COMPARISON:  Preoperative CT dated December 7, 2020  TECHNIQUE:  CT examination of the abdomen and pelvis was performed  Axial, sagittal, and coronal 2D reformatted images were created from the source data and submitted for interpretation  Radiation dose length product (DLP) for this visit:  451 mGy-cm   This examination, like all CT scans performed in the [facility], was performed utilizing techniques to minimize radiation dose exposure, including the use of iterative   reconstruction and automated exposure control  IV Contrast:  100 mL of iohexol (OMNIPAQUE)  Enteric Contrast:  Enteric contrast was not administered  FINDINGS:     ABDOMEN     LOWER CHEST:  No clinically significant abnormality identified in the visualized lower chest      LIVER/BILIARY TREE:  Unremarkable  GALLBLADDER:  Probable tiny gallstone within the gallbladder lumen (series 601 image 54, series 602 image 135)  No pericholecystic inflammatory change  SPLEEN:  Unremarkable  PANCREAS:  Unremarkable  ADRENAL GLANDS:  Unremarkable  KIDNEYS/URETERS:  Mild urothelial enhancement mid left ureter  No hydronephrosis bilaterally  No evidence of radiopaque urinary tract calculus  STOMACH AND BOWEL:  No bowel obstruction  APPENDIX:  A normal appendix was visualized  ABDOMINOPELVIC CAVITY:  Fat stranding as described above and below  There is a small amount of free fluid in the pelvis  There is no pneumoperitoneum  VESSELS:  There are prominent vascular structures bilaterally in the adnexal regions, left greater than right  The left gonadal veins are dilated, increased compared to the prior study    Additionally, there is stranding of the fat adjacent to the mid to   distal left gonadal veins and anteromedial to the left psoas muscle  PELVIS     REPRODUCTIVE ORGANS:  There is a 5 x 5 x 4 5 cm cystic structure on the right ovary  URINARY BLADDER:  Unremarkable  ABDOMINAL WALL/INGUINAL REGIONS:  Unremarkable  OSSEOUS STRUCTURES:  No acute fracture or destructive osseous lesion  IMPRESSION:     There are prominent vascular structures bilaterally in the adnexal regions, left greater than right  The left gonadal veins are dilated, increased in size compared with December 7, 2020  There is stranding of the fat adjacent to the mid to distal left   gonadal veins and anteromedial to the mid to distal left psoas muscle  This may possibly be related to phlebitis of the left gonadal veins  Clinical correlation and follow-up is recommended  OB/GYN consultation and follow-up is recommended  There is mild urothelial enhancement of the mid left ureter, likely related to the above-described inflammatory change surrounding the left gonadal veins  Correlation with urinalysis and urine culture and sensitivity is recommended in order to exclude   the possibility of urinary tract infection  There is a 5 x 5 x 4 5 cm cystic structure on the right ovary  Pelvic ultrasound is recommended for further characterization  Probable tiny gallstone within the gallbladder lumen  No CT evidence of acute cholecystitis  This examination demonstrates findings for which clinical and imaging follow-up is recommended and was logged as such in [address] [place] [address] Rd  The study was marked in [facility] for immediate notification             Workstation performed: [id number]

## 2022-01-11 NOTE — ED NOTES
Lab did not have enough urine in the specimen for the Chlamydia test  Pt reported she does not believe she has any stds and did not want to provide more urine        Abdulaziz Hector RN  01/11/22 [id number]

## 2022-01-12 ENCOUNTER — TELEPHONE (OUTPATIENT)
Dept: OBGYN CLINIC | Facility: CLINIC | Age: 42
End: 2022-01-12

## 2022-01-12 LAB
BACTERIA UR CULT: ABNORMAL
BACTERIA UR CULT: ABNORMAL

## 2022-01-12 NOTE — TELEPHONE ENCOUNTER
The radiology report recommends repeat US in 8-12 weeks, however, she should have an appointment with a physician to review

## 2022-01-12 NOTE — TELEPHONE ENCOUNTER
Patient called  She is a patient of Dr Antonio Rojas  She went to the ED yesterday for flank pain and had a CT-scan and pelvis U/S done that showed a 5cm cyst on the right ovary  She states she wasn't given much guidance on what to do next  She has a hx of a left ovarian cyst that required her left ovary to be removed  She doesn't want to loose her right ovary as well  Should this patient just be offered an apt with anyone?    She was told Dr Antonio Rojas doesn't have any upcoming apts available

## 2022-01-17 ENCOUNTER — OFFICE VISIT (OUTPATIENT)
Dept: OBGYN CLINIC | Facility: CLINIC | Age: 42
End: 2022-01-17
Payer: COMMERCIAL

## 2022-01-17 VITALS
BODY MASS INDEX: 26.91 KG/M2 | WEIGHT: 157.6 LBS | SYSTOLIC BLOOD PRESSURE: 128 MMHG | HEIGHT: 64 IN | DIASTOLIC BLOOD PRESSURE: 72 MMHG

## 2022-01-17 DIAGNOSIS — N83.201 RIGHT OVARIAN CYST: Primary | ICD-10-CM

## 2022-01-17 PROCEDURE — 1036F TOBACCO NON-USER: CPT | Performed by: OBSTETRICS & GYNECOLOGY

## 2022-01-17 PROCEDURE — 99214 OFFICE O/P EST MOD 30 MIN: CPT | Performed by: OBSTETRICS & GYNECOLOGY

## 2022-01-17 PROCEDURE — 3008F BODY MASS INDEX DOCD: CPT | Performed by: OBSTETRICS & GYNECOLOGY

## 2022-01-17 RX ORDER — CHLORHEXIDINE GLUCONATE 0.12 MG/ML
RINSE ORAL
COMMUNITY
Start: 2021-12-23

## 2022-01-17 RX ORDER — SODIUM FLUORIDE 0.9 MG/ML
MOUTHWASH DENTAL
COMMUNITY
Start: 2022-01-07

## 2022-01-17 NOTE — PROGRESS NOTES
Assessment/Plan    Diagnoses and all orders for this visit:    Right ovarian cyst  -     Cancel: US abdomen and pelvis with transvaginal; Future  -     US pelvis complete w transvaginal; Future    Other orders  -     SODIUM FLUORIDE, DENTAL RINSE, 0 2 % SOLN  -     chlorhexidine (PERIDEX) 0 12 % solution    -plan to repeat pelvic u/s in 2 weeks  -we discussed differential diagnosis of benign vs  Malignant ovarian mass  -advised patient if she develops any worsening symptoms/ acute pain consistent with torsion to go to the ER  -if ovarian mass persists or enlarges at 2 month point, will schedule for cystectomy vs  Oophorectomy  -we briefly discussed need for HRT if she requires oophorectomy  -to follow up after pelvic u/s     Imaging findings for pelvic congestion?  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  Had an endometrial ablation 2017, but continues to have cycles  Does notice worsening discomfort around her cycle  Concerned with possible pelvic congestion on CT scan         Patient Active Problem List   Diagnosis    Headache    Chronic migraine without aura without status migrainosus, not intractable    Myofascial pain syndrome, cervical    Preventative health care    Family history of first degree relative with bicuspid aortic valve    Murmur    Menstrual migraine without status migrainosus, not intractable    Chronic migraine without aura         Gynecologic History  Patient's last menstrual period was 2022 (exact date)    Last Pap: 2021- ASCUS, HPV negative     Obstetric History  OB History    Para Term  AB Living   2 2 2   0 2   SAB IAB Ectopic Multiple Live Births           2      # Outcome Date GA Lbr Javier/2nd Weight Sex Delivery Anes PTL Lv   2 Term 09    M Vag-Spont   IVAN   1 Term 07    F Vag-Spont   IVAN      Obstetric Comments   Menarche 17         Past Medical History:   Diagnosis Date    Abnormal Pap smear of cervix     In her early 19s    Back pain     Irregular heart beat     Palpitations    Menorrhagia     Migraines        Past Surgical History:   Procedure Laterality Date    OOPHORECTOMY Left 2020    Procedure: SALPINGO-OOPHORECTOMY, LAPAROSCOPIC;  Surgeon: Saarh Bravo DO;  [facility];  Service: Gynecology    OVARIAN CYST SURGERY      KY HYSTEROSCOPY,W/ENDO BX N/A 2017    Procedure: DILATATION AND CURETTAGE (D&C) WITH HYSTEROSCOPY;  Surgeon: Blaine Quinn DO;  [facility];  Service: Gynecology    [place] HYSTEROSCOPY,W/ENDOMETRIAL ABLATION N/A 2017    Procedure: ABLATION ENDOMETRIAL  Ventura Robinsons;  Surgeon: Blaine Quinn DO;  [facility];  Service: Gynecology    KY LAP,DIAGNOSTIC ABDOMEN N/A 2020    Procedure: LAPAROSCOPY DIAGNOSTIC;  Surgeon: Sarah Bravo DO;  [facility];  Service: Gynecology    WISDOM TOOTH EXTRACTION           Family History   Problem Relation Age of Onset    No Known Problems Mother     No Known Problems Father     No Known Problems Brother     No Known Problems Daughter     No Known Problems Son     Dementia Maternal Grandmother     Heart disease Maternal Grandmother     Diabetes Maternal Grandmother     No Known Problems Maternal Grandfather     No Known Problems Paternal Grandmother     Heart disease Paternal Grandfather        Social History Socioeconomic History    Marital status: /Civil Union     Spouse name: Not on file    Number of children: Not on file    Years of education: Not on file    Highest education level: Not on file   Occupational History    Not on file   Tobacco Use    Smoking status: Never Smoker    Smokeless tobacco: Never Used   Vaping Use    Vaping Use: Never used   Substance and Sexual Activity    Alcohol use: No    Drug use: Never    Sexual activity: Yes     Partners: Male     Birth control/protection: None   Other Topics Concern    Not on file   Social History Narrative    Not on file     Social Determinants of Health     Financial Resource Strain: Not on file   Food Insecurity: Not on file   Transportation Needs: Not on file   Physical Activity: Not on file   Stress: Not on file   Social Connections: Not on file   Intimate Partner Violence: Not on file   Housing Stability: Not on file       Allergies  Patient has no known allergies  Medications    Current Outpatient Medications:     Botox 100 units, INJECT UP  UNITS  INTRAMUSCULARLY EVERY 3  MONTHS (GIVEN AT MD OFFICE, DISCARD UNUSED), Disp: 2 each, Rfl: 3    cefdinir (OMNICEF) 300 mg capsule, Take 1 capsule (300 mg total) by mouth every 12 (twelve) hours for 14 days, Disp: 28 capsule, Rfl: 0    chlorhexidine (PERIDEX) 0 12 % solution, , Disp: , Rfl:     Coenzyme Q10 (COQ10) 100 MG CAPS, 1 cap qd  , Disp: 30 capsule, Rfl: 2    gabapentin (NEURONTIN) 100 mg capsule, TAKE 2 CAPSULES BY MOUTH EVERY DAY AT BEDTIME, Disp: 180 capsule, Rfl: 2    SODIUM FLUORIDE, DENTAL RINSE, 0 2 % SOLN, , Disp: , Rfl:     SUMAtriptan (IMITREX) 100 mg tablet, TAKE ONE TAB AT MIGRAINE ONSET, REPEAT AFTER 2 HR IF NEEDED   NO MORE THAN 2/24 HOURS OR 3 PER WEEK , Disp: 9 tablet, Rfl: 2    SUMAtriptan (IMITREX) 6 mg/0 5 mL, INJECT 0 5 ML UNDER THE SKIN AS 1 DOSE, Disp: 2 5 mL, Rfl: 0    Syringe/Needle, Disp, (SYRINGE 3CC/02EA8-4/4") 27G X 1-1/4" 3 ML MISC, Use for Toradol intramuscular injections  , Disp: 3 each, Rfl: 2    TiZANidine (ZANAFLEX) 2 MG capsule, TAKE 1 CAPSULE (2 MG TOTAL) BY MOUTH AS NEEDED FOR MUSCLE SPASMS, Disp: 90 capsule, Rfl: 1    famotidine (PEPCID) 20 mg tablet, Take 1 tablet (20 mg total) by mouth 2 (two) times a day (Patient not taking: Reported on 10/7/2021), Disp: 30 tablet, Rfl: 0    medroxyPROGESTERone (DEPO-PROVERA) 150 mg/mL injection, Inject 1 mL (150 mg total) into a muscle every 3 (three) months for 1 dose, Disp: 1 mL, Rfl: 3    naproxen (NAPROSYN) 500 mg tablet, Take 1 tablet (500 mg total) by mouth every 12 (twelve) hours as needed for mild pain for up to 5 days, Disp: 10 tablet, Rfl: 0    norethindrone-ethinyl estradiol-ferrous fumarate (LOESTIN 24 FE) 1-20 MG-MCG(24) per tablet, Take 1 tablet by mouth daily (Patient not taking: Reported on 10/7/2021), Disp: 84 tablet, Rfl: 4    Rimegepant Sulfate (NURTEC) 75 MG TBDP, 1 tab at migraine onset  No more than one dose per 24 hours  Hold triptan  (Patient not taking: Reported on 10/7/2021), Disp: 10 tablet, Rfl: 0      Review of Systems  Review of Systems   Constitutional: Negative for activity change, chills, fever and unexpected weight change  HENT: Negative for congestion, ear pain, hearing loss and sore throat  Respiratory: Negative for cough, chest tightness and shortness of breath  Cardiovascular: Negative for chest pain and leg swelling  Gastrointestinal: Negative for abdominal pain, constipation, diarrhea, nausea and vomiting  Genitourinary: Positive for pelvic pain  Negative for difficulty urinating, dysuria, frequency, menstrual problem, vaginal discharge and vaginal pain  Per HPI   Skin: Negative for color change and rash  Neurological: Negative for dizziness, numbness and headaches  Psychiatric/Behavioral: Negative for agitation and confusion            Objective     /72   Ht 5' 4" (1 626 m)   Wt 71 5 kg (157 lb 9 6 oz)   LMP 01/09/2022 (Exact Date)   BMI 27 05 kg/m²       Physical Exam  Constitutional:       General: She is not in acute distress  Appearance: Normal appearance  HENT:      Head: Normocephalic and atraumatic  Eyes:      Conjunctiva/sclera: Conjunctivae normal       Pupils: Pupils are equal, round, and reactive to light  Pulmonary:      Effort: Pulmonary effort is normal    Musculoskeletal:         General: Normal range of motion  Cervical back: Normal range of motion  Neurological:      General: No focal deficit present  Mental Status: She is alert and oriented to person, place, and time  Psychiatric:         Mood and Affect: Mood normal          Behavior: Behavior normal          Thought Content: Thought content normal    Vitals and nursing note reviewed  US pelvis complete w transvaginal  Narrative: PELVIC ULTRASOUND, COMPLETE    INDICATION:  46years old  Left lower quadrant abdominal pain; left flank pain       COMPARISON: Ultrasound 12/7/2022, CT same day  TECHNIQUE:   Transabdominal pelvic ultrasound was performed in sagittal and transverse planes with a curvilinear transducer  Additional transvaginal imaging was performed to better evaluate the endometrium and ovaries  Imaging included volumetric   sweeps as well as traditional still imaging technique  FINDINGS:    UTERUS:  The uterus is retroverted in position, measuring 10 4 x 5 3 x 5 6 cm  Contour and echotexture appear normal   The cervix shows no suspicious abnormality  ENDOMETRIUM:    Normal caliber of 8 mm  Homogeneous and normal in appearance  OVARIES/ADNEXA:  Right ovary:  5 5 x 5 4 x 5 6 cm  Complex 5 1 x 4 1 x 4 8 cm septated right ovarian cyst with internal debris level is noted  Doppler flow within normal limits  Left ovary:  Surgically absent  No suspicious adnexal mass or loculated collections  There is no free fluid    Impression:    Complex 5 1 x 4 1 x 4 8 cm septated right ovarian cyst with internal debris level noted  Follow-up in 8-12 weeks is recommended  Workstation performed: [id number]  CT abdomen pelvis with contrast  Narrative: CT ABDOMEN AND PELVIS WITH IV CONTRAST    INDICATION:   LLQ abdominal pain with radiation to left flank  Nausea, vomiting  History of left salpingo-oophorectomy on December 7, 2020  COMPARISON:  Preoperative CT dated December 7, 2020  TECHNIQUE:  CT examination of the abdomen and pelvis was performed  Axial, sagittal, and coronal 2D reformatted images were created from the source data and submitted for interpretation  Radiation dose length product (DLP) for this visit:  451 mGy-cm   This examination, like all CT scans performed in the [facility], was performed utilizing techniques to minimize radiation dose exposure, including the use of iterative   reconstruction and automated exposure control  IV Contrast:  100 mL of iohexol (OMNIPAQUE)  Enteric Contrast:  Enteric contrast was not administered  FINDINGS:    ABDOMEN    LOWER CHEST:  No clinically significant abnormality identified in the visualized lower chest     LIVER/BILIARY TREE:  Unremarkable  GALLBLADDER:  Probable tiny gallstone within the gallbladder lumen (series 601 image 54, series 602 image 135)  No pericholecystic inflammatory change  SPLEEN:  Unremarkable  PANCREAS:  Unremarkable  ADRENAL GLANDS:  Unremarkable  KIDNEYS/URETERS:  Mild urothelial enhancement mid left ureter  No hydronephrosis bilaterally  No evidence of radiopaque urinary tract calculus  STOMACH AND BOWEL:  No bowel obstruction  APPENDIX:  A normal appendix was visualized  ABDOMINOPELVIC CAVITY:  Fat stranding as described above and below  There is a small amount of free fluid in the pelvis  There is no pneumoperitoneum  VESSELS:  There are prominent vascular structures bilaterally in the adnexal regions, left greater than right    The left gonadal veins are dilated, increased compared to the prior study  Additionally, there is stranding of the fat adjacent to the mid to   distal left gonadal veins and anteromedial to the left psoas muscle  PELVIS    REPRODUCTIVE ORGANS:  There is a 5 x 5 x 4 5 cm cystic structure on the right ovary  URINARY BLADDER:  Unremarkable  ABDOMINAL WALL/INGUINAL REGIONS:  Unremarkable  OSSEOUS STRUCTURES:  No acute fracture or destructive osseous lesion  Impression: There are prominent vascular structures bilaterally in the adnexal regions, left greater than right  The left gonadal veins are dilated, increased in size compared with December 7, 2020  There is stranding of the fat adjacent to the mid to distal left   gonadal veins and anteromedial to the mid to distal left psoas muscle  This may possibly be related to phlebitis of the left gonadal veins  Clinical correlation and follow-up is recommended  OB/GYN consultation and follow-up is recommended  There is mild urothelial enhancement of the mid left ureter, likely related to the above-described inflammatory change surrounding the left gonadal veins  Correlation with urinalysis and urine culture and sensitivity is recommended in order to exclude   the possibility of urinary tract infection  There is a 5 x 5 x 4 5 cm cystic structure on the right ovary  Pelvic ultrasound is recommended for further characterization  Probable tiny gallstone within the gallbladder lumen  No CT evidence of acute cholecystitis  This examination demonstrates findings for which clinical and imaging follow-up is recommended and was logged as such in [address] [place] [address] Rd  The study was marked in [facility] for immediate notification      Workstation performed: [id number]

## 2022-01-26 ENCOUNTER — TELEPHONE (OUTPATIENT)
Dept: NEUROLOGY | Facility: CLINIC | Age: 42
End: 2022-01-26

## 2022-01-26 NOTE — TELEPHONE ENCOUNTER
Call [facility] spoke with Cyndie  Botox:  200 units is schedule for delivery on 01/28/2022  Via: [facility]    Please inform of delivery

## 2022-01-26 NOTE — TELEPHONE ENCOUNTER
Botox: Approve  200 units  [id number]  Eff: 01/26/2022 until 04/26/22  1 Visit    Please use [facility]

## 2022-01-28 NOTE — TELEPHONE ENCOUNTER
Botox number of units: 200   Botox quantity: 2  Arrived at what location: [place]  Botox at Correct Administering Location: yes  NDC number: 3344-9121-53  Lot number: [id number]  Expiration Date: 02/2024  Appt notes indicate correct medication: yes

## 2022-02-18 ENCOUNTER — PROCEDURE VISIT (OUTPATIENT)
Dept: NEUROLOGY | Facility: CLINIC | Age: 42
End: 2022-02-18
Payer: COMMERCIAL

## 2022-02-18 VITALS
SYSTOLIC BLOOD PRESSURE: 110 MMHG | HEART RATE: 80 BPM | BODY MASS INDEX: 27.14 KG/M2 | WEIGHT: 159 LBS | DIASTOLIC BLOOD PRESSURE: 60 MMHG | HEIGHT: 64 IN

## 2022-02-18 DIAGNOSIS — G43.709 CHRONIC MIGRAINE WITHOUT AURA WITHOUT STATUS MIGRAINOSUS, NOT INTRACTABLE: Primary | ICD-10-CM

## 2022-02-18 PROCEDURE — 3008F BODY MASS INDEX DOCD: CPT | Performed by: OBSTETRICS & GYNECOLOGY

## 2022-02-18 PROCEDURE — 64615 CHEMODENERV MUSC MIGRAINE: CPT | Performed by: PHYSICIAN ASSISTANT

## 2022-02-18 NOTE — PROGRESS NOTES
Patient ID: Sean Sharma is a 46 y o  female  Assessment/Plan:    No problem-specific Assessment & Plan notes found for this encounter  {Assess/PlanSmartLinks:95141}       Subjective:    HPI    {[facility] HPI texts:01830}    {Common ambulatory SmartLinks:48015}         Objective:    Blood pressure 110/60, pulse 80, height 5' 4" (1 626 m), weight 72 1 kg (159 lb), not currently breastfeeding  Physical Exam    Neurological Exam      ROS:    Review of Systems   Constitutional: Negative  Negative for appetite change and fever  HENT: Negative  Negative for hearing loss, tinnitus, trouble swallowing and voice change  Eyes: Negative  Negative for photophobia and pain  Respiratory: Negative  Negative for shortness of breath  Cardiovascular: Negative  Negative for palpitations  Gastrointestinal: Negative  Negative for nausea and vomiting  Endocrine: Negative  Negative for cold intolerance  Genitourinary: Negative  Negative for dysuria, frequency and urgency  Musculoskeletal: Negative  Negative for myalgias and neck pain  Skin: Negative  Negative for rash  Neurological: Positive for numbness and headaches  Negative for dizziness, tremors, seizures, syncope, facial asymmetry, speech difficulty, weakness and light-headedness  Patient stated that she has right side arm and leg numbness and tingling, that comes and goes  Patient stated that she has 3 headaches a month  Psychiatric/Behavioral: Negative  Negative for confusion, hallucinations and sleep disturbance

## 2022-02-18 NOTE — PROGRESS NOTES
Universal Protocol   Consent: Verbal consent obtained  Written consent obtained    Risks and benefits: risks, benefits and alternatives were discussed  Consent given by: patient  Patient understanding: patient states understanding of the procedure being performed  Patient consent: the patient's understanding of the procedure matches consent given  Procedure consent: procedure consent matches procedure scheduled        Chemodenervation     Date/Time 2/18/2022 11:11 AM     Performed by  Yasmine Reeder PA-C     Authorized by Yasmine Reeder PA-C        Pre-procedure details      Prepped With: Alcohol     Procedure details     Position:  Upright   Botox     Botox Type:  Type A    Brand:  Botox    mL's of Botulinum Toxin:  155    Final Concentration per CC:  100 units    Needle Gauge:  30 G 2 5 inch   Procedures     Botox Procedures: chronic headache      Indications: migraines     Injection Location      Head / Face:  L superior trapezius, R superior trapezius, L superior cervical paraspinal, R superior cervical paraspinal, L , R , procerus, L temporalis, R temporalis, R frontalis, L frontalis, R medial occipitalis and L medial occipitalis    L  injection amount:  5 unit(s)    R  injection amount:  5 unit(s)    L lateral frontalis:  5 unit(s)    R lateral frontalis:  5 unit(s)    L medial frontalis:  5 unit(s)    R medial frontalis:  5 unit(s)    L temporalis injection amount:  20 unit(s)    R temporalis injection amount:  20 unit(s)    Procerus injection amount:  5 unit(s)    L medial occipitalis injection amount:  15 unit(s)    R medial occipitalis injection amount:  15 unit(s)    L superior cervical paraspinal injection amount:  10 unit(s)    R superior cervical paraspinal injection amount:  10 unit(s)    L superior trapezius injection amount:  15 unit(s)    R superior trapezius injection amount:  15 unit(s)   Total Units     Total units used:  155    Total units discarded: 45   Post-procedure details      Chemodenervation:  Chronic migraine    Facial Nerve Location[de-identified]  Bilateral facial nerve    Patient tolerance of procedure: Tolerated well, no immediate complications   Comments      Extra units medically necessary:  -2 5 under left eye/ bridge of nose  -2 5 under right eye/ bridge of nose  - 5 units between both cervical paraspinal muscles  -10 L temporalis  -10 R temporalis  Avoided traps  Blood pressure 110/60, pulse 80, height 5' 4" (1 626 m), weight 72 1 kg (159 lb), not currently breastfeeding  Body mass index is 27 29 kg/m²

## 2022-02-24 DIAGNOSIS — G43.709 CHRONIC MIGRAINE WITHOUT AURA WITHOUT STATUS MIGRAINOSUS, NOT INTRACTABLE: ICD-10-CM

## 2022-02-25 RX ORDER — TIZANIDINE HYDROCHLORIDE 2 MG/1
2 CAPSULE, GELATIN COATED ORAL AS NEEDED
Qty: 90 CAPSULE | Refills: 1 | Status: SHIPPED | OUTPATIENT
Start: 2022-02-25 | End: 2022-03-07

## 2022-02-25 RX ORDER — SUMATRIPTAN 6 MG/.5ML
INJECTION, SOLUTION SUBCUTANEOUS
Qty: 2.5 ML | Refills: 0 | Status: SHIPPED | OUTPATIENT
Start: 2022-02-25 | End: 2022-04-13

## 2022-03-03 NOTE — PROGRESS NOTES
ASSESSMENT & PLAN: Santana More is a 47 y o  [id number] with normal gynecologic exam     1   Routine well woman exam done today  2    Pap and HPV:Pap with HPV was done today  Current ASCCP Guidelines reviewed  3   The patient declined STD testing  Safe sex practices have been discussed  4  The patient is sexually active  She relies on vasectomy for contraception and options have been discussed  5  Heavy periods: offered CHCs  Patient accepted but not sure she will start them, wants to see how ibuprofen works first     6  The following were reviewed in today's visit: breast self exam, exercise and healthy diet  7  Patient to return to office in 12 months for annual exam      All questions have been answered to her satisfaction  CC:  Annual Gynecologic Examination    HPI: Santana More is a 47 y o  [id number] who presents for annual gynecologic examination  She has the following concerns:  Heavy bleeding and cramping happening cyclically  Ongoing issue for several years since her ablation  Health Maintenance:    She exercises 1 days per week  She wears her seatbelt routinely  She does perform regular monthly self breast exams  She feels safe at home  She does follow a balanced diet      She does not use tobacco    Past Medical History:   Diagnosis Date    Abnormal Pap smear of cervix     In her early 19's    Back pain     Irregular heart beat     Palpitations    Menorrhagia     Migraines        Past Surgical History:   Procedure Laterality Date    OOPHORECTOMY Left 12/7/2020    Procedure: SALPINGO-OOPHORECTOMY, LAPAROSCOPIC;  Surgeon: Maeve Posada DO;  [facility];  Service: Gynecology    OVARIAN CYST SURGERY      MT HYSTEROSCOPY,W/ENDO BX N/A 11/9/2017    Procedure: DILATATION AND CURETTAGE (D&C) WITH HYSTEROSCOPY;  Surgeon: Casimiro Fabian DO;  [facility];  Service: Gynecology    MT HYSTEROSCOPY,W/ENDOMETRIAL ABLATION N/A 11/9/2017    Procedure: Barbara Wood ENDOMETRIAL  Letty Lucas;  Surgeon: Darren Mcginnis DO;  [facility];  Service: Gynecology    IA LAP,DIAGNOSTIC ABDOMEN N/A 2020    Procedure: LAPAROSCOPY DIAGNOSTIC;  Surgeon: Kamilla Villarreal DO;  [facility];  Service: Gynecology    WISDOM TOOTH EXTRACTION         Past OB/Gyn History:  Period Cycle (Days): 28  Period Duration (Days): 4  Period Pattern: Regular  Menstrual Flow: Heavy  Menstrual Control: Maxi pad  Menstrual Control Change Freq (Hours): 2  Dysmenorrhea: (!) Severe  Dysmenorrhea Symptoms: CrampingPatient's last menstrual period was 2021  History of sexually transmitted infection No  Patient is currently sexually active: heterosexual  Birth control:vasectomy  Future fertility: is not desired      Last Pap  2017 :  no abnormalities per patient    OB History    Para Term  AB Living   2 2 2 0 0 2   SAB TAB Ectopic Multiple Live Births   0 0 0 0 2      # Outcome Date GA Lbr Javier/2nd Weight Sex Delivery Anes PTL Lv   2 Term 09    M Vag-Spont   IVAN   1 Term 07    F Vag-Spont   IVAN      Obstetric Comments   Menarche 17       Family History:  Family History   Problem Relation Age of Onset    No Known Problems Mother     No Known Problems Father     No Known Problems Brother     No Known Problems Daughter     No Known Problems Son     Dementia Maternal Grandmother     Heart disease Maternal Grandmother     Diabetes Maternal Grandmother     No Known Problems Maternal Grandfather     No Known Problems Paternal Grandmother     Heart disease Paternal Grandfather        Social History:  Social History     Socioeconomic History    Marital status: /Civil Union     Spouse name: Not on file    Number of children: Not on file    Years of education: Not on file    Highest education level: Not on file   Occupational History    Not on file   Social Needs    Financial resource strain: Not on file    Food insecurity     Worry: Not on file Inability: Not on file    Transportation needs     Medical: Not on file     Non-medical: Not on file   Tobacco Use    Smoking status: Never Smoker    Smokeless tobacco: Never Used   Substance and Sexual Activity    Alcohol use: No    Drug use: Never    Sexual activity: Yes     Partners: Male     Birth control/protection: None   Lifestyle    Physical activity     Days per week: Not on file     Minutes per session: Not on file    Stress: Not on file   Relationships    Social connections     Talks on phone: Not on file     Gets together: Not on file     Attends [employer] service: Not on file     Active member of club or organization: Not on file     Attends meetings of clubs or organizations: Not on file     Relationship status: Not on file    Intimate partner violence     Fear of current or ex partner: Not on file     Emotionally abused: Not on file     Physically abused: Not on file     Forced sexual activity: Not on file   Other Topics Concern    Not on file   Social History Narrative    Not on file     Presently lives with her partner  Patient is   Allergies:  No Known Allergies    Medications:    Current Outpatient Medications:     Botox 100 units, , Disp: , Rfl:     Coenzyme Q10 (COQ10) 100 MG CAPS, 1 cap qd  , Disp: 30 capsule, Rfl: 2    gabapentin (NEURONTIN) 100 mg capsule, TAKE 2 CAPSULES BY MOUTH EVERY DAY AT BEDTIME, Disp: 180 capsule, Rfl: 0    ibuprofen (MOTRIN) 600 mg tablet, Take 1 tablet (600 mg total) by mouth every 6 (six) hours as needed for mild pain or moderate pain, Disp: 30 tablet, Rfl: 0    indomethacin (INDOCIN) 25 mg capsule, 1-2 tabs q8 hours prn headache (with imitrex if needed) WITH FOOD , Disp: 30 capsule, Rfl: 0    ketorolac (TORADOL) 10 mg tablet, Take 1 tablet (10 mg total) by mouth every 6 (six) hours as needed (migraine) Max 2-3 per week  Hold advil, Disp: 10 tablet, Rfl: 0    Rimegepant Sulfate (NURTEC) 75 MG TBDP, 1 tab at migraine onset   No more than one dose per 24 hours  Hold triptan , Disp: 10 tablet, Rfl: 0    SUMAtriptan (IMITREX) 100 mg tablet, TAKE ONE TAB AT MIGRAINE ONSET, REPEAT AFTER 2 HR IF NEEDED  NO MORE THAN 2/24 HOURS OR 3 PER WEEK , Disp: 9 tablet, Rfl: 0    Syringe/Needle, Disp, (SYRINGE 3CC/48KN4-4/4") 27G X 1-1/4" 3 ML MISC, Use for Toradol intramuscular injections  , Disp: 3 each, Rfl: 2    TiZANidine (ZANAFLEX) 2 MG capsule, TAKE 1 CAPSULE (2 MG TOTAL) BY MOUTH AS NEEDED FOR MUSCLE SPASMS, Disp: 90 capsule, Rfl: 1    dihydroergotamine (MIGRANAL) 4 MG/ML nasal spray, One spray in one nostril, repeat aafter 30 minutes if needed  No more than 4 doses per month  Hold imitrex (Patient not taking: Reported on 3/18/2021), Disp: 4 mL, Rfl: 2    SUMAtriptan (IMITREX) 6 mg/0 5 mL, INJECT 0 5 ML (6 MG TOTAL) UNDER THE SKIN ONCE FOR 1 DOSE, Disp: 2 mL, Rfl: 0    Review of Systems:  Denies fevers, chills, unintentional weight loss, excessive fatigue, chest pain, shortness of breath, abdominal pain, nausea, vomiting, urinary incontinence, urinary frequency, vaginal bleeding, vaginal discharge  All other systems negative unless otherwise stated  Physical Exam:  /80 (BP Location: Right arm, Patient Position: Sitting, Cuff Size: Standard)   Ht 5' 4" (1 626 m)   Wt 78 2 kg (172 lb 6 4 oz)   LMP 02/23/2021   BMI 29 59 kg/m²  Body mass index is 29 59 kg/m²  GEN: The patient was alert and oriented x3, pleasant well-appearing female in no acute distress  HEENT:  Unremarkable, no anterior or posterior lymphadenopathy, no thyromegaly  CV:  Regular rate and rhythm, normal S1 and S2, no murmurs  RESP:  Clear to auscultation bilaterally, no wheezes, rales or rhonchi  BREAST:  Symmetric breasts with no palpable breast masses or obvious breast lesions  She has no retractions or nipple discharge  She has no axillary abnormalities or palpable masses     GI:  Soft, nontender, non-distended  MSK: bilateral lower extremities are nontender, no edema  : Normal appearing external female genitalia, normal appearing urethral meatus  On sterile speculum exam,  normal appearing vaginal epithelium, no vaginal discharge, no bleeding, grossly normal appearing cervix  On bimanual exam,  no cervical motion tenderness; uterus is smooth, mobile, nontender 6 weeks size  No tenderness or fullness in the right adnexa, the left is surgically absent  - - -

## 2022-03-06 DIAGNOSIS — G43.709 CHRONIC MIGRAINE WITHOUT AURA WITHOUT STATUS MIGRAINOSUS, NOT INTRACTABLE: ICD-10-CM

## 2022-03-07 RX ORDER — TIZANIDINE HYDROCHLORIDE 2 MG/1
CAPSULE, GELATIN COATED ORAL
Qty: 90 CAPSULE | Refills: 0 | Status: SHIPPED | OUTPATIENT
Start: 2022-03-07 | End: 2022-03-21

## 2022-03-20 DIAGNOSIS — G43.709 CHRONIC MIGRAINE WITHOUT AURA WITHOUT STATUS MIGRAINOSUS, NOT INTRACTABLE: ICD-10-CM

## 2022-03-21 RX ORDER — TIZANIDINE HYDROCHLORIDE 2 MG/1
CAPSULE, GELATIN COATED ORAL
Qty: 270 CAPSULE | Refills: 1 | Status: SHIPPED | OUTPATIENT
Start: 2022-03-21

## 2022-04-12 DIAGNOSIS — G43.709 CHRONIC MIGRAINE WITHOUT AURA WITHOUT STATUS MIGRAINOSUS, NOT INTRACTABLE: ICD-10-CM

## 2022-04-13 ENCOUNTER — TELEPHONE (OUTPATIENT)
Dept: NEUROLOGY | Facility: CLINIC | Age: 42
End: 2022-04-13

## 2022-04-13 RX ORDER — SUMATRIPTAN 6 MG/.5ML
INJECTION, SOLUTION SUBCUTANEOUS
Qty: 2.5 ML | Refills: 0 | Status: SHIPPED | OUTPATIENT
Start: 2022-04-13 | End: 2022-05-27

## 2022-04-20 ENCOUNTER — TELEMEDICINE (OUTPATIENT)
Dept: NEUROLOGY | Facility: CLINIC | Age: 42
End: 2022-04-20
Payer: COMMERCIAL

## 2022-04-20 DIAGNOSIS — G43.709 CHRONIC MIGRAINE WITHOUT AURA WITHOUT STATUS MIGRAINOSUS, NOT INTRACTABLE: Primary | ICD-10-CM

## 2022-04-20 DIAGNOSIS — M79.18 MYOFASCIAL PAIN SYNDROME, CERVICAL: ICD-10-CM

## 2022-04-20 DIAGNOSIS — G43.829 MENSTRUAL MIGRAINE WITHOUT STATUS MIGRAINOSUS, NOT INTRACTABLE: ICD-10-CM

## 2022-04-20 PROCEDURE — 1036F TOBACCO NON-USER: CPT | Performed by: PHYSICIAN ASSISTANT

## 2022-04-20 PROCEDURE — 99213 OFFICE O/P EST LOW 20 MIN: CPT | Performed by: PHYSICIAN ASSISTANT

## 2022-04-20 RX ORDER — ACETAMINOPHEN AND CODEINE PHOSPHATE 120; 12 MG/5ML; MG/5ML
1 SOLUTION ORAL DAILY
COMMUNITY
End: 2022-05-04 | Stop reason: ALTCHOICE

## 2022-04-20 NOTE — PROGRESS NOTES
Virtual Regular Visit    Verification of patient location:    Patient is located in the following state in which I hold an active license [place]      Assessment/Plan:    Problem List Items Addressed This Visit        Cardiovascular and Mediastinum    Chronic migraine without aura without status migrainosus, not intractable - Primary    Menstrual migraine without status migrainosus, not intractable       Musculoskeletal and Integument    Myofascial pain syndrome, cervical        Nicole Carls is so far satisfied with the current regimen of Botox q90 days for prevention, and with PRN medications noted below  Will continue PRN:  · Indocin for lower grade migraine or HA  · Sumatriptan PO for severe migraine  · Last resort she takes Imitrex injectable, or if wakes up in the middle of the night with one, or if she needs quick relief (understands not to take PO and injectable imitrex at the same time)  · Has also used Nurtec in the past  · Can use tizanidine PRN neck tension    Continue hormone replacement Micornor  Will see her for botox in May  The patient should not hesitate to call me prior to her follow up with any questions or concerns  Reason for visit is   Chief Complaint   Patient presents with    Virtual Regular Visit        Encounter provider Dewey Denton PA-C    Provider located at [address] [place] [address] [address] [facility]  [phone]      Recent Visits  No visits were found meeting these conditions  Showing recent visits within past 7 days and meeting all other requirements  Today's Visits  Date Type Provider Dept   04/20/22 106 Dayan Garcia PA-C [facility] today's visits and meeting all other requirements  Future Appointments  No visits were found meeting these conditions    Showing future appointments within next 150 days and meeting all other requirements       The patient was identified by name and date of birth  Sunil Gonzalez was informed that this is a telemedicine visit and that the visit is being conducted through [facility] and patient was informed this is a secure, HIPAA-complaint platform  She agrees to proceed     My office door was closed  No one else was in the room  She acknowledged consent and understanding of privacy and security of the video platform  The patient has agreed to participate and understands they can discontinue the visit at any time  Patient is aware this is a billable service  Antonio Suarez is a 46 y o  female who is contacted via telemedicine for neurological follow-up  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[employer], self-employed  She has 2 children  HPI    Since last seen she reports significant reduction of migraine headaches  States she went for 34 days WITHOUT a migraine which is a record for her  States she had one low grade headache for which she used indocin and it helped  She is happy because she is able to exercise more and function better through the week/ month without as much pain  She is now trying new hormonal medications due to pain with menstrual cycle  She tried loestin but it worsened migraines after day 14 of taking it, so she came off of it  She is on progesterone (Micronor) for 5 days now, and migraines are not worse and hopefully will not worsen on this      As mentioned at the last visit, she continues to report significant reduction of migraines with Botox injections  She denies side effects to Botox  When she stopped Botox for 3 or more months the migraines worsened significantly      Since starting botox, the patient reports greater than 7 days of migraine relief from baseline, correlated with headache diary, decreased abortive medication use and decreased ER visits      Biggest triggers include menstrual cycle, stress, weather changes  In the past, migraines would occur once per month and last 3-4 days    She sometimes has blurry vision prior to the headache starting, but no focal deficits  No visual obscurations  She is dysfunctional during the headache because of the pain      Prior note : We recommended "Cocktail- Imitrex + advil +/- 100 mg gabapentin + benadryl if going to sleep  If the above is ineffective, could try decadron or toradol  I asked the pt to consider Emgality for prevention  She will consider  Since imitrex 1/2 life is short, she will consider DHE for menstrual migraines  Sometimes imitrex wears off quickly  "     Headaches usually more severe in the morning, she gets a morning headache at least 3 times per week since last seen and she thinks it is because of her increased neck tension     Quality- pounding, dull, sharp  Location- either R or L temple, either R or L supraorbital, somtimes radiating into the mid frontal between the eyes  Associated with: nausea, emesis, photophobia and phonophobia, blurred vision, difficulty with focusing when looking at light i e  Computer, neck stiffness, sore neck, sometimes dizziness     Triggers: weather changes, menstrual cycle (biggest trigger), stress/ tension, bright lights/ fluorescent lighting     Meds taken for headaches:  Prevention: Gabapentin (100 mg qhs, higher dose causes s/e), Coq10, Magnesium (nausea sometimes), Riboflavin, Tizanidine, Cyclobenzaprine, Verapamil, venlafaxine-never tried worried about side effects, Botox, tizanidine    Abortive: Imitrex PO, Imitrex injectable, Aleve, Replax- effective, Fioricet, Tramadol, Promethazine, compazine, frova, olanzapine, maxalt, Toradol, decadron- ineffective, indocin, nurtec    Other tx: PT for neck, chiropractor- triggered migraines     Neck tension and HAs are sometimes better towards end of day  She can sometimes get rid of a headache at the onset when she goes into a certain yoga position where she curls/ flexes the entire spine while the laying on the ground and flexes the neck with the forehead on the ground  Sometimes the migraines evolves anyway       States cervical and trap mm pain worsens with using arm weights in the gym, 10 lbs or more  Reports left shoulder pain  Denies sensory changes, weakness, falls, imbalance, incontinence      Did PT in the past which was helpful  Past Medical History:   Diagnosis Date    Abnormal Pap smear of cervix     In her early 21's    Back pain     Irregular heart beat     Palpitations    Menorrhagia     Migraines        Past Surgical History:   Procedure Laterality Date    OOPHORECTOMY Left 12/7/2020    Procedure: SALPINGO-OOPHORECTOMY, LAPAROSCOPIC;  Surgeon: Holly Day DO;  [facility];  Service: Gynecology    OVARIAN CYST SURGERY      FL HYSTEROSCOPY,W/ENDO BX N/A 11/9/2017    Procedure: DILATATION AND CURETTAGE (D&C) WITH HYSTEROSCOPY;  Surgeon: María Joseph DO;  [facility];  Service: Gynecology    FL HYSTEROSCOPY,W/ENDOMETRIAL ABLATION N/A 11/9/2017    Procedure: ABLATION ENDOMETRIAL  Corina Estefani;  Surgeon: María Joseph DO;  [facility];  Service: Gynecology    FL LAP,DIAGNOSTIC ABDOMEN N/A 12/7/2020    Procedure: LAPAROSCOPY DIAGNOSTIC;  Surgeon: Holly Day DO;  [facility];  Service: Gynecology    WISDOM TOOTH EXTRACTION         Current Outpatient Medications   Medication Sig Dispense Refill    Botox 100 units INJECT UP  UNITS  INTRAMUSCULARLY EVERY 3  MONTHS (GIVEN AT MD OFFICE, DISCARD UNUSED) 2 each 3    Coenzyme Q10 (COQ10) 100 MG CAPS 1 cap qd  30 capsule 2    gabapentin (NEURONTIN) 100 mg capsule TAKE 2 CAPSULES BY MOUTH EVERY DAY AT BEDTIME (Patient taking differently: 100 mg  ) 180 capsule 2    norethindrone (MICRONOR) 0 35 MG tablet Take 1 tablet by mouth daily      Rimegepant Sulfate (NURTEC) 75 MG TBDP 1 tab at migraine onset  No more than one dose per 24 hours  Hold triptan   10 tablet 0    SODIUM FLUORIDE, DENTAL RINSE, 0 2 % SOLN       SUMAtriptan (IMITREX) 100 mg tablet TAKE ONE TAB AT MIGRAINE ONSET, REPEAT AFTER 2 HR IF NEEDED  NO MORE THAN 2/24 HOURS OR 3 PER WEEK  9 tablet 2    SUMAtriptan (IMITREX) 6 mg/0 5 mL INJECT 0 5 ML UNDER THE SKIN AS 1 DOSE 2 5 mL 0    Syringe/Needle, Disp, (SYRINGE 3CC/05LZ3-1/4") 27G X 1-1/4" 3 ML MISC Use for Toradol intramuscular injections  3 each 2    TiZANidine (ZANAFLEX) 2 MG capsule TAKE 1 CAPSULE BY MOUTH THREE TIMES A DAY AS NEEDED FOR MUSCLE SPASM 270 capsule 1    chlorhexidine (PERIDEX) 0 12 % solution  (Patient not taking: Reported on 4/20/2022 )      famotidine (PEPCID) 20 mg tablet Take 1 tablet (20 mg total) by mouth 2 (two) times a day (Patient not taking: Reported on 2/18/2022 ) 30 tablet 0    medroxyPROGESTERone (DEPO-PROVERA) 150 mg/mL injection Inject 1 mL (150 mg total) into a muscle every 3 (three) months for 1 dose (Patient not taking: Reported on 4/20/2022 ) 1 mL 3    naproxen (NAPROSYN) 500 mg tablet Take 1 tablet (500 mg total) by mouth every 12 (twelve) hours as needed for mild pain for up to 5 days (Patient not taking: Reported on 4/20/2022 ) 10 tablet 0    norethindrone-ethinyl estradiol-ferrous fumarate (LOESTIN 24 FE) 1-20 MG-MCG(24) per tablet Take 1 tablet by mouth daily (Patient not taking: Reported on 2/18/2022 ) 84 tablet 4     No current facility-administered medications for this visit  No Known Allergies    Review of Systems   Constitutional: Negative  Negative for appetite change and fever  HENT: Negative  Negative for hearing loss, tinnitus, trouble swallowing and voice change  Eyes: Negative  Negative for photophobia and pain  Respiratory: Negative  Negative for shortness of breath  Cardiovascular: Negative  Negative for palpitations  Gastrointestinal: Negative  Negative for nausea and vomiting  Endocrine: Negative  Negative for cold intolerance  Genitourinary: Negative  Negative for dysuria, frequency and urgency  Musculoskeletal: Negative    Negative for myalgias and neck pain    Skin: Negative  Negative for rash  Neurological: Positive for headaches  Negative for dizziness, tremors, seizures, syncope, facial asymmetry, speech difficulty, weakness, light-headedness and numbness  Hematological: Negative  Does not bruise/bleed easily  Psychiatric/Behavioral: Negative  Negative for confusion, hallucinations and sleep disturbance  The following portions of the patient's history were reviewed and updated as appropriate: allergies, current medications/ medication history, past family history, past medical history, past social history, past surgical history and problem list     Review of systems was reviewed and otherwise unremarkable from a neurological perspective  Video Exam    There were no vitals filed for this visit  Physical Exam   Neurological exam:  On neurologic exam, the patient is alert and oriented to time and place  Speech is fluent and articulate, and the patient follows commands appropriately  Judgment and affect appear normal   Extraocular muscles are intact without nystagmus  Face is symmetric  Hearing is intact bilaterally  Motor examination reveals adequate range of motion  I spent 20 minutes directly with the patient during this visit    [address] [place] [address] verbally agrees to participate in [employer]  Pt is aware that [employer] could be limited without vital signs or the ability to perform a full hands-on physical exam  Amanda Pereira understands she or the provider may request at any time to terminate the video visit and request the patient to seek care or treatment in person

## 2022-05-03 NOTE — PROGRESS NOTES
Assessment/Plan:    Encounter for annual routine gynecological examination  Pap/HPV collected  Mammogram ordered        Right ovarian cyst  Repeat ultrasound ordered    Pelvic pain may be due to this cyst, possibly endometriosis  Recommend continued OC use  If cyst is unchanged or larger, consider surgical removal      If endometriosis treatment options would include OC, depoprovera, depolupron, orlissa, surgery  Diagnoses and all orders for this visit:    Encounter for annual routine gynecological examination  -     Liquid-based pap, screening  -     HPV High Risk    Screening mammogram, encounter for  -     Mammo screening bilateral w 3d & cad; Future    Right ovarian cyst  -     Cancel: US pelvis transabdominal only; Future  -     US pelvis complete w transvaginal; Future  -     norethindrone (Incassia) 0 35 MG tablet; Take 1 tablet (0 35 mg total) by mouth 2 (two) times a day    Pelvic pain  -     norethindrone (Incassia) 0 35 MG tablet; Take 1 tablet (0 35 mg total) by mouth 2 (two) times a day    Encounter for general counseling on prescription of oral contraceptives  -     norethindrone (Incassia) 0 35 MG tablet; Take 1 tablet (0 35 mg total) by mouth 2 (two) times a day    Other orders  -     Discontinue: norethindrone (Incassia) 0 35 MG tablet; Take 1 tablet by mouth daily          Subjective:      Patient ID: Jose Hagen is a 46 y o  female  Patient presents for a routine annual visit  Menarche- 17 Y/O  Last Pap Smear- 3/18/21 ascus-NEG HPV  LMP-4/27  Birth control-incassia   [id number]  Non smoker  Patient is not a drinker  Currently sexually active  No family history of uterine, ovarian, cervical or breast cancer  Patient would like to discuss painful periods  Lo-loesterin was prescribed but has severe migraines  Is currently on progesterone pills and is spotting  Gynecologic Exam  The patient's primary symptoms include pelvic pain and vaginal bleeding   The patient's pertinent negatives include no genital itching, genital lesions, genital odor, genital rash, missed menses or vaginal discharge  This is a recurrent problem  The current episode started more than 1 year ago  The problem occurs intermittently  The problem has been waxing and waning  Pertinent negatives include no abdominal pain, anorexia, back pain, chills, constipation, diarrhea, dysuria, fever, flank pain, frequency, headaches, hematuria, joint pain, joint swelling, nausea, painful intercourse, rash, sore throat, urgency or vomiting  The vaginal bleeding is lighter than menses  She is sexually active  Her menstrual history has been irregular  The following portions of the patient's history were reviewed and updated as appropriate:   She  has a past medical history of Abnormal Pap smear of cervix, Back pain, Irregular heart beat, Menorrhagia, and Migraines  She   Patient Active Problem List    Diagnosis Date Noted    Encounter for annual routine gynecological examination 05/06/2022    Right ovarian cyst 05/06/2022    Chronic migraine without aura 02/03/2021    Menstrual migraine without status migrainosus, not intractable 01/15/2021    Murmur 01/12/2021    Family history of first degree relative with bicuspid aortic valve 01/11/2021    Myofascial pain syndrome, cervical 06/27/2019    Chronic migraine without aura without status migrainosus, not intractable 03/19/2018    Headache 01/09/2014     She  has a past surgical history that includes [place] tooth extraction; Ovarian cyst surgery; pr hysteroscopy,w/endometrial ablation (N/A, 11/9/2017); pr hysteroscopy,w/endo bx (N/A, 11/9/2017); pr lap,diagnostic abdomen (N/A, 12/7/2020); and Oophorectomy (Left, 12/7/2020)    Her family history includes Dementia in her maternal grandmother; Diabetes in her maternal grandmother; Heart disease in her maternal grandmother and paternal grandfather; No Known Problems in her brother, daughter, father, maternal grandfather, mother, paternal grandmother, and son  She  reports that she has never smoked  She has never used smokeless tobacco  She reports that she does not drink alcohol and does not use drugs  Current Outpatient Medications   Medication Sig Dispense Refill    Botox 100 units INJECT UP  UNITS  INTRAMUSCULARLY EVERY 3  MONTHS (GIVEN AT MD OFFICE, DISCARD UNUSED) 2 each 3    Coenzyme Q10 (COQ10) 100 MG CAPS 1 cap qd  30 capsule 2    gabapentin (NEURONTIN) 100 mg capsule TAKE 2 CAPSULES BY MOUTH EVERY DAY AT BEDTIME (Patient taking differently: 100 mg  ) 180 capsule 2    norethindrone (Incassia) 0 35 MG tablet Take 1 tablet (0 35 mg total) by mouth 2 (two) times a day 56 tablet 11    Rimegepant Sulfate (NURTEC) 75 MG TBDP 1 tab at migraine onset  No more than one dose per 24 hours  Hold triptan  10 tablet 0    SODIUM FLUORIDE, DENTAL RINSE, 0 2 % SOLN       SUMAtriptan (IMITREX) 100 mg tablet TAKE ONE TAB AT MIGRAINE ONSET, REPEAT AFTER 2 HR IF NEEDED  NO MORE THAN 2/24 HOURS OR 3 PER WEEK  9 tablet 2    SUMAtriptan (IMITREX) 6 mg/0 5 mL INJECT 0 5 ML UNDER THE SKIN AS 1 DOSE 2 5 mL 0    Syringe/Needle, Disp, (SYRINGE 3CC/04WT4-4/4") 27G X 1-1/4" 3 ML MISC Use for Toradol intramuscular injections  3 each 2    TiZANidine (ZANAFLEX) 2 MG capsule TAKE 1 CAPSULE BY MOUTH THREE TIMES A DAY AS NEEDED FOR MUSCLE SPASM 270 capsule 1    chlorhexidine (PERIDEX) 0 12 % solution  (Patient not taking: Reported on 4/20/2022 )       No current facility-administered medications for this visit  Current Outpatient Medications on File Prior to Visit   Medication Sig    Botox 100 units INJECT UP  UNITS  INTRAMUSCULARLY EVERY 3  MONTHS (GIVEN AT MD OFFICE, DISCARD UNUSED)    Coenzyme Q10 (COQ10) 100 MG CAPS 1 cap qd   gabapentin (NEURONTIN) 100 mg capsule TAKE 2 CAPSULES BY MOUTH EVERY DAY AT BEDTIME (Patient taking differently: 100 mg  )    Rimegepant Sulfate (NURTEC) 75 MG TBDP 1 tab at migraine onset   No more than one dose per 24 hours  Hold triptan   SODIUM FLUORIDE, DENTAL RINSE, 0 2 % SOLN     SUMAtriptan (IMITREX) 100 mg tablet TAKE ONE TAB AT MIGRAINE ONSET, REPEAT AFTER 2 HR IF NEEDED  NO MORE THAN 2/24 HOURS OR 3 PER WEEK   SUMAtriptan (IMITREX) 6 mg/0 5 mL INJECT 0 5 ML UNDER THE SKIN AS 1 DOSE    Syringe/Needle, Disp, (SYRINGE 3CC/52KY1-3/4") 27G X 1-1/4" 3 ML MISC Use for Toradol intramuscular injections   TiZANidine (ZANAFLEX) 2 MG capsule TAKE 1 CAPSULE BY MOUTH THREE TIMES A DAY AS NEEDED FOR MUSCLE SPASM    chlorhexidine (PERIDEX) 0 12 % solution  (Patient not taking: Reported on 4/20/2022 )     No current facility-administered medications on file prior to visit  She has No Known Allergies       Review of Systems   Constitutional: Negative for activity change, appetite change, chills, fatigue and fever  HENT: Negative for rhinorrhea, sneezing and sore throat  Eyes: Negative for visual disturbance  Respiratory: Negative for cough, shortness of breath and wheezing  Cardiovascular: Negative for chest pain, palpitations and leg swelling  Gastrointestinal: Negative for abdominal distention, abdominal pain, anorexia, constipation, diarrhea, nausea and vomiting  Genitourinary: Positive for pelvic pain  Negative for difficulty urinating, dysuria, flank pain, frequency, hematuria, missed menses, urgency and vaginal discharge  Musculoskeletal: Negative for back pain and joint pain  Skin: Negative for rash  Neurological: Negative for syncope, light-headedness and headaches  Objective:      /80   Ht 5' 4" (1 626 m)   Wt 67 7 kg (149 lb 3 2 oz)   LMP 04/27/2022   BMI 25 61 kg/m²          Physical Exam  Chest:   Breasts: Breasts are symmetrical       Right: No inverted nipple, mass, nipple discharge, skin change or tenderness  Left: No inverted nipple, mass, nipple discharge, skin change or tenderness         Genitourinary:     Labia:         Right: No rash, tenderness, lesion or injury  Left: No rash, tenderness, lesion or injury  Vagina: Normal  No vaginal discharge, tenderness or bleeding  Cervix: No cervical motion tenderness, discharge or friability  Uterus: Not deviated, not enlarged, not fixed and not tender  Adnexa:         Right: Tenderness and fullness present  No mass  Left: Fullness present  No mass or tenderness  Neurological:      Mental Status: She is alert and oriented to person, place, and time

## 2022-05-04 ENCOUNTER — OFFICE VISIT (OUTPATIENT)
Dept: OBGYN CLINIC | Facility: CLINIC | Age: 42
End: 2022-05-04
Payer: COMMERCIAL

## 2022-05-04 VITALS
BODY MASS INDEX: 25.47 KG/M2 | HEIGHT: 64 IN | SYSTOLIC BLOOD PRESSURE: 118 MMHG | DIASTOLIC BLOOD PRESSURE: 80 MMHG | WEIGHT: 149.2 LBS

## 2022-05-04 DIAGNOSIS — N83.201 RIGHT OVARIAN CYST: ICD-10-CM

## 2022-05-04 DIAGNOSIS — Z12.31 SCREENING MAMMOGRAM, ENCOUNTER FOR: ICD-10-CM

## 2022-05-04 DIAGNOSIS — R10.2 PELVIC PAIN: ICD-10-CM

## 2022-05-04 DIAGNOSIS — Z30.09 ENCOUNTER FOR GENERAL COUNSELING ON PRESCRIPTION OF ORAL CONTRACEPTIVES: ICD-10-CM

## 2022-05-04 DIAGNOSIS — Z01.419 ENCOUNTER FOR ANNUAL ROUTINE GYNECOLOGICAL EXAMINATION: Primary | ICD-10-CM

## 2022-05-04 PROCEDURE — G0145 SCR C/V CYTO,THINLAYER,RESCR: HCPCS | Performed by: PATHOLOGY

## 2022-05-04 PROCEDURE — G0476 HPV COMBO ASSAY CA SCREEN: HCPCS | Performed by: OBSTETRICS & GYNECOLOGY

## 2022-05-04 PROCEDURE — G0124 SCREEN C/V THIN LAYER BY MD: HCPCS | Performed by: PATHOLOGY

## 2022-05-04 PROCEDURE — 3008F BODY MASS INDEX DOCD: CPT | Performed by: OBSTETRICS & GYNECOLOGY

## 2022-05-04 PROCEDURE — 1036F TOBACCO NON-USER: CPT | Performed by: OBSTETRICS & GYNECOLOGY

## 2022-05-04 PROCEDURE — 99385 PREV VISIT NEW AGE 18-39: CPT | Performed by: OBSTETRICS & GYNECOLOGY

## 2022-05-04 RX ORDER — ACETAMINOPHEN AND CODEINE PHOSPHATE 120; 12 MG/5ML; MG/5ML
1 SOLUTION ORAL 2 TIMES DAILY
Qty: 56 TABLET | Refills: 11 | Status: SHIPPED | OUTPATIENT
Start: 2022-05-04

## 2022-05-04 RX ORDER — ACETAMINOPHEN AND CODEINE PHOSPHATE 120; 12 MG/5ML; MG/5ML
1 SOLUTION ORAL DAILY
COMMUNITY
End: 2022-05-04 | Stop reason: SDUPTHER

## 2022-05-06 PROBLEM — N83.201 RIGHT OVARIAN CYST: Status: ACTIVE | Noted: 2022-05-06

## 2022-05-06 PROBLEM — Z00.00 PREVENTATIVE HEALTH CARE: Status: RESOLVED | Noted: 2021-01-11 | Resolved: 2022-05-06

## 2022-05-06 PROBLEM — Z01.419 ENCOUNTER FOR ANNUAL ROUTINE GYNECOLOGICAL EXAMINATION: Status: ACTIVE | Noted: 2022-05-06

## 2022-05-06 NOTE — ASSESSMENT & PLAN NOTE
Repeat ultrasound ordered    Pelvic pain may be due to this cyst, possibly endometriosis  Recommend continued OC use  If cyst is unchanged or larger, consider surgical removal      If endometriosis treatment options would include OC, depoprovera, depolupron, orlissa, surgery

## 2022-05-12 ENCOUNTER — TELEPHONE (OUTPATIENT)
Dept: NEUROLOGY | Facility: CLINIC | Age: 42
End: 2022-05-12

## 2022-05-12 LAB
LAB AP GYN PRIMARY INTERPRETATION: ABNORMAL
Lab: ABNORMAL
PATH INTERP SPEC-IMP: ABNORMAL

## 2022-05-12 NOTE — TELEPHONE ENCOUNTER
Botox 200 Units   Authorization Approved    Auth# Key: [id number] - PA Case ID: [id number]  Valid 5/12/22-8/12/22    2 visits     Please use [facility]

## 2022-05-12 NOTE — TELEPHONE ENCOUNTER
Authorization submitted through [facility]     Will await approval/denial    Key: [id number] - PA Case ID: [id number]

## 2022-05-12 NOTE — TELEPHONE ENCOUNTER
Called [facility] to schedule delivery  Botox 200 units   Delivery to [facility]  5/17/2022  Via [employer]       Please advise if medication doesn't arrive

## 2022-05-17 NOTE — TELEPHONE ENCOUNTER
Botox number of units:100 X 2   Botox quantity: 100X2  Arrived at what location: [facility]  Botox at Correct Administering Location: yes  NDC number: 8473057829  Lot number: [id number]  Expiration Date: 05/2024  Appt notes indicate correct medication: yes

## 2022-05-18 ENCOUNTER — TELEPHONE (OUTPATIENT)
Dept: OBGYN CLINIC | Facility: CLINIC | Age: 42
End: 2022-05-18

## 2022-05-20 ENCOUNTER — PROCEDURE VISIT (OUTPATIENT)
Dept: NEUROLOGY | Facility: CLINIC | Age: 42
End: 2022-05-20
Payer: COMMERCIAL

## 2022-05-20 VITALS — SYSTOLIC BLOOD PRESSURE: 117 MMHG | TEMPERATURE: 97.4 F | DIASTOLIC BLOOD PRESSURE: 74 MMHG

## 2022-05-20 DIAGNOSIS — G43.701 CHRONIC MIGRAINE WITHOUT AURA WITH STATUS MIGRAINOSUS, NOT INTRACTABLE: Primary | ICD-10-CM

## 2022-05-20 PROCEDURE — 64615 CHEMODENERV MUSC MIGRAINE: CPT | Performed by: PHYSICIAN ASSISTANT

## 2022-05-20 NOTE — PROGRESS NOTES
Universal Protocol   Consent: Verbal consent obtained  Written consent obtained    Risks and benefits: risks, benefits and alternatives were discussed  Consent given by: patient  Patient understanding: patient states understanding of the procedure being performed  Patient consent: the patient's understanding of the procedure matches consent given  Procedure consent: procedure consent matches procedure scheduled        Chemodenervation     Date/Time 5/20/2022 2:13 PM     Performed by  Vargas Palma PA-C     Authorized by Vargas Palma PA-C        Pre-procedure details      Prepped With: Alcohol     Procedure details     Position:  Upright   Botox     Botox Type:  Type A    Brand:  Botox    mL's of Botulinum Toxin:  180    Final Concentration per CC:  100 units    Needle Gauge:  30 G 2 5 inch   Procedures     Botox Procedures: chronic headache      Indications: migraines     Injection Location      Head / Face:  L superior trapezius, R superior trapezius, L superior cervical paraspinal, R superior cervical paraspinal, L , R , procerus, L temporalis, R temporalis, R frontalis, L frontalis, R medial occipitalis and L medial occipitalis    L  injection amount:  5 unit(s)    R  injection amount:  5 unit(s)    L lateral frontalis:  5 unit(s)    R lateral frontalis:  5 unit(s)    L medial frontalis:  5 unit(s)    R medial frontalis:  5 unit(s)    L temporalis injection amount:  20 unit(s)    R temporalis injection amount:  20 unit(s)    Procerus injection amount:  5 unit(s)    L medial occipitalis injection amount:  15 unit(s)    R medial occipitalis injection amount:  15 unit(s)    L superior cervical paraspinal injection amount:  10 unit(s)    R superior cervical paraspinal injection amount:  10 unit(s)    L superior trapezius injection amount:  0 unit(s)    R superior trapezius injection amount:  0 unit(s)   Total Units     Total units used:  180    Total units discarded:  20 Post-procedure details      Chemodenervation:  Chronic migraine    Facial Nerve Location[de-identified]  Bilateral facial nerve    Patient tolerance of procedure: Tolerated well, no immediate complications   Comments      Extra units medically necessary:  - 5 under left eye/ bridge of nose  - 5 under right eye/ bridge of nose  - 10 units between both cervical paraspinal muscles  - 10 L temporalis (anterior)  - 10 R temporalis (anterior)  - 15 scalp t/o  Avoided traps  Blood pressure 117/74, temperature (!) 97 4 °F (36 3 °C), last menstrual period 04/27/2022, not currently breastfeeding  Focus on midline of the forehead; if needed next visit will inject above eyebrows  Avoiding today due to very mild, transient R eyelid heaviness, no vision changes  Ophtho eval normal recently

## 2022-05-27 DIAGNOSIS — G43.709 CHRONIC MIGRAINE WITHOUT AURA WITHOUT STATUS MIGRAINOSUS, NOT INTRACTABLE: ICD-10-CM

## 2022-05-27 RX ORDER — SUMATRIPTAN 6 MG/.5ML
INJECTION, SOLUTION SUBCUTANEOUS
Qty: 2.5 ML | Refills: 0 | Status: SHIPPED | OUTPATIENT
Start: 2022-05-27 | End: 2022-06-26

## 2022-06-17 ENCOUNTER — TELEPHONE (OUTPATIENT)
Dept: OBGYN CLINIC | Facility: CLINIC | Age: 42
End: 2022-06-17

## 2022-06-17 DIAGNOSIS — N95.2 VAGINAL ATROPHY: Primary | ICD-10-CM

## 2022-06-17 RX ORDER — ESTRADIOL 0.1 MG/G
CREAM VAGINAL
Qty: 42.5 G | Refills: 3 | Status: SHIPPED | OUTPATIENT
Start: 2022-06-17 | End: 2022-07-31

## 2022-06-17 NOTE — TELEPHONE ENCOUNTER
Pt said there was a vaginal cream you advised she try with her ongoing issue, she is asking if that can be sent in

## 2022-06-17 NOTE — TELEPHONE ENCOUNTER
Irregular bleeding with progestin only pill along with vaginal discomfort during intercourse  Wants to avoid estrogen oral tablets due to migraines    Will try estrogen cream to see if resolves above symptoms

## 2022-06-27 ENCOUNTER — OFFICE VISIT (OUTPATIENT)
Dept: FAMILY MEDICINE CLINIC | Facility: CLINIC | Age: 42
End: 2022-06-27
Payer: COMMERCIAL

## 2022-06-27 VITALS
HEART RATE: 80 BPM | TEMPERATURE: 98.6 F | DIASTOLIC BLOOD PRESSURE: 70 MMHG | OXYGEN SATURATION: 99 % | WEIGHT: 149 LBS | BODY MASS INDEX: 25.44 KG/M2 | HEIGHT: 64 IN | SYSTOLIC BLOOD PRESSURE: 110 MMHG

## 2022-06-27 DIAGNOSIS — J30.2 SEASONAL ALLERGIES: Primary | ICD-10-CM

## 2022-06-27 DIAGNOSIS — J34.89 RHINORRHEA: ICD-10-CM

## 2022-06-27 DIAGNOSIS — R09.81 NASAL CONGESTION: ICD-10-CM

## 2022-06-27 DIAGNOSIS — R09.82 PND (POST-NASAL DRIP): ICD-10-CM

## 2022-06-27 PROCEDURE — 3008F BODY MASS INDEX DOCD: CPT | Performed by: FAMILY MEDICINE

## 2022-06-27 PROCEDURE — 1036F TOBACCO NON-USER: CPT | Performed by: FAMILY MEDICINE

## 2022-06-27 PROCEDURE — 99213 OFFICE O/P EST LOW 20 MIN: CPT | Performed by: FAMILY MEDICINE

## 2022-06-27 RX ORDER — LORATADINE 10 MG/1
10 TABLET ORAL DAILY
Qty: 30 TABLET | Refills: 0 | Status: SHIPPED | OUTPATIENT
Start: 2022-06-27 | End: 2022-08-01

## 2022-06-27 NOTE — PROGRESS NOTES
Outpatient Note- acute    HPI:     Riley Presley , 46 y o  female  presents today for sinus congestion/sinus pressure  Over the last several weeks the patient has noted increased nasal congestion especially when she wakes up in the morning  Over the course of the last week the patient has had increased nasal congestion, runny nose, and increased irritation around the outside of the nose  Currently she is continuing to experience the postnasal drip and the feeling of wetness in her sinuses  Patient has recently started on estrogen recently, she has been on a progesterone tablet for some time  She has not been taking any new over-the-counter medications or antihistamines  She did take Benadryl once which helped with her sneezing  Past Medical History:   Diagnosis Date    Abnormal Pap smear of cervix     In her early 19's    Back pain     Irregular heart beat     Palpitations    Menorrhagia     Migraines       ROS:   Review of Systems   Constitutional: Negative for chills and fever  HENT: Positive for congestion, postnasal drip, rhinorrhea, sinus pressure and sneezing (improved)  Negative for ear discharge, ear pain, hearing loss, sinus pain and sore throat  Eyes: Negative for pain, itching and visual disturbance  Respiratory: Negative for cough, chest tightness and shortness of breath  Cardiovascular: Negative for chest pain and palpitations  Gastrointestinal: Negative for abdominal pain, constipation, diarrhea, nausea and vomiting  Skin: Negative for rash  Neurological: Positive for headaches  Negative for dizziness and light-headedness  OBJECTIVE  Vitals:    06/27/22 1454   BP: 110/70   Pulse: 80   Temp: 98 6 °F (37 °C)   SpO2: 99%        Physical Exam  Constitutional:       General: She is not in acute distress  Appearance: Normal appearance  She is not ill-appearing, toxic-appearing or diaphoretic  HENT:      Head: Normocephalic and atraumatic        Comments: No sinus pressure or tenderness in maxillary or frontal bilaterally to palpation     Right Ear: Tympanic membrane, ear canal and external ear normal       Left Ear: Tympanic membrane, ear canal and external ear normal       Ears:      Comments: ETD, clear serous fluid with bubbling behind TM  Nose: Congestion ( mild) and rhinorrhea ( clear mucus) present  Mouth/Throat:      Mouth: Mucous membranes are moist       Pharynx: Oropharynx is clear  No oropharyngeal exudate or posterior oropharyngeal erythema  Comments: Frothy, white sputum- PND present  Eyes:      General:         Right eye: No discharge  Left eye: No discharge  Extraocular Movements: Extraocular movements intact  Conjunctiva/sclera: Conjunctivae normal       Pupils: Pupils are equal, round, and reactive to light  Cardiovascular:      Rate and Rhythm: Normal rate and regular rhythm  Pulses: Normal pulses  Heart sounds: Normal heart sounds  No murmur heard  No friction rub  No gallop  Pulmonary:      Effort: Pulmonary effort is normal  No respiratory distress  Breath sounds: Normal breath sounds  No stridor  No wheezing, rhonchi or rales  Musculoskeletal:      Cervical back: Neck supple  No tenderness  Lymphadenopathy:      Cervical: No cervical adenopathy  Skin:     General: Skin is warm  Capillary Refill: Capillary refill takes less than 2 seconds  Neurological:      Mental Status: She is alert  ASSESSMENT AND PLAN   Rubi Valles was seen today for cold like symptoms  Diagnoses and all orders for this visit:    Seasonal allergies  Nasal congestion  Rhinorrhea  PND (post-nasal drip)  Patient likely has allergies 2/2 to the physical examination findings including frothy, white sputum, ETD, and serious drainage behind TM  The patient is to start with claritin and add flonase if not improved in 5-7 days    If the symptoms continue despite the medication treatment then consider Astelin to help with swelling and dry up sinuses  She does not have any pressure, pain, fever/chills, or colored drainage that would indicate sinus infection  -     loratadine (CLARITIN) 10 mg tablet;  Take 1 tablet (10 mg total) by mouth daily         Phillip Pierre [facility]  6/27/2022 3:19 PM

## 2022-06-27 NOTE — PATIENT INSTRUCTIONS
Please start taking Claritin 10 mg daily  I believe that your having some moderate allergic rhinitis or allergy symptoms  Please start taking the Claritin for the 1st 5-7 days, after 7-10 days she will see the maximum affect of the Claritin at this time  If all of your symptoms have not improved, you may consider including Flonase  Please make sure to avoid the middle of the nose with the Flonase  Point it a little bit towards the ear lobe and make sure you are taking small sniffs in between the sprays  You may use up to 2 sprays in each nostril once a day  If between these 2 interventions you are not having significant improvement please come back and see us here in the office to be re-evaluated

## 2022-08-01 DIAGNOSIS — J30.2 SEASONAL ALLERGIES: ICD-10-CM

## 2022-08-01 DIAGNOSIS — R09.82 PND (POST-NASAL DRIP): ICD-10-CM

## 2022-08-01 DIAGNOSIS — J34.89 RHINORRHEA: ICD-10-CM

## 2022-08-01 DIAGNOSIS — R09.81 NASAL CONGESTION: ICD-10-CM

## 2022-08-01 RX ORDER — LORATADINE 10 MG/1
TABLET ORAL
Qty: 90 TABLET | Refills: 1 | Status: SHIPPED | OUTPATIENT
Start: 2022-08-01

## 2022-08-02 ENCOUNTER — TELEPHONE (OUTPATIENT)
Dept: NEUROLOGY | Facility: CLINIC | Age: 42
End: 2022-08-02

## 2022-08-02 NOTE — TELEPHONE ENCOUNTER
Spoke to Abhijit Price at  [facility]  [employer] and set up delivery for:    Botox: 100 units  Qty: 2  Delivery to [facility]  Scheduled: 8/4/2022       Please advise if medication does not arrive

## 2022-08-03 ENCOUNTER — TELEPHONE (OUTPATIENT)
Dept: NEUROLOGY | Facility: CLINIC | Age: 42
End: 2022-08-03

## 2022-08-03 NOTE — TELEPHONE ENCOUNTER
Prior authorization initiated through [employer] [employer] with key:[id number] for:    Botox: 200 units     Approved  Auth #: [id number]  Valid: 8/3/2022 until 11/3/2022  1 visit     [facility]

## 2022-08-04 NOTE — TELEPHONE ENCOUNTER
Botox number of units: 2 boxes 100 each  Botox quantity:2  Arrived at what location: [facility]  Botox at Correct Administering Location: yes  NDC number:1653408268  Lot number:[id number]  Expiration Date: 06/2024  Appt notes indicate correct medication: yes

## 2022-08-05 DIAGNOSIS — G43.709 CHRONIC MIGRAINE WITHOUT AURA WITHOUT STATUS MIGRAINOSUS, NOT INTRACTABLE: ICD-10-CM

## 2022-08-05 RX ORDER — SUMATRIPTAN 6 MG/.5ML
INJECTION, SOLUTION SUBCUTANEOUS
Qty: 2.5 ML | Refills: 0 | Status: SHIPPED | OUTPATIENT
Start: 2022-08-05 | End: 2022-10-01

## 2022-08-19 ENCOUNTER — PROCEDURE VISIT (OUTPATIENT)
Dept: NEUROLOGY | Facility: CLINIC | Age: 42
End: 2022-08-19
Payer: COMMERCIAL

## 2022-08-19 VITALS — SYSTOLIC BLOOD PRESSURE: 100 MMHG | TEMPERATURE: 97.4 F | DIASTOLIC BLOOD PRESSURE: 70 MMHG | HEART RATE: 87 BPM

## 2022-08-19 DIAGNOSIS — G43.709 CHRONIC MIGRAINE WITHOUT AURA WITHOUT STATUS MIGRAINOSUS, NOT INTRACTABLE: Primary | ICD-10-CM

## 2022-08-19 PROCEDURE — 64615 CHEMODENERV MUSC MIGRAINE: CPT | Performed by: PHYSICIAN ASSISTANT

## 2022-08-19 NOTE — PROGRESS NOTES
Universal Protocol   Consent: Verbal consent obtained  Written consent obtained    Risks and benefits: risks, benefits and alternatives were discussed  Consent given by: patient  Patient understanding: patient states understanding of the procedure being performed  Patient consent: the patient's understanding of the procedure matches consent given  Procedure consent: procedure consent matches procedure scheduled        Chemodenervation     Date/Time 8/19/2022 2:19 PM     Performed by  Deedee Hunt PA-C     Authorized by Deedee Hunt PA-C        Pre-procedure details      Prepped With: Alcohol     Procedure details     Position:  Upright   Botox     Botox Type:  Type A    Brand:  Botox    mL's of Botulinum Toxin:  180    Final Concentration per CC:  100 units    Needle Gauge:  30 G 2 5 inch   Procedures     Botox Procedures: chronic headache      Indications: migraines     Injection Location      Head / Face:  L superior trapezius, R superior trapezius, L superior cervical paraspinal, R superior cervical paraspinal, L , R , procerus, L temporalis, R temporalis, R frontalis, L frontalis, R medial occipitalis and L medial occipitalis    L  injection amount:  5 unit(s)    R  injection amount:  5 unit(s)    L lateral frontalis:  5 unit(s)    R lateral frontalis:  5 unit(s)    L medial frontalis:  5 unit(s)    R medial frontalis:  5 unit(s)    L temporalis injection amount:  20 unit(s)    R temporalis injection amount:  20 unit(s)    Procerus injection amount:  5 unit(s)    L medial occipitalis injection amount:  15 unit(s)    R medial occipitalis injection amount:  15 unit(s)    L superior cervical paraspinal injection amount:  10 unit(s)    R superior cervical paraspinal injection amount:  10 unit(s)    L superior trapezius injection amount:  0 unit(s)    R superior trapezius injection amount:  0 unit(s)   Total Units     Total units used:  180    Total units discarded:  20 Post-procedure details      Chemodenervation:  Chronic migraine    Facial Nerve Location[de-identified]  Bilateral facial nerve    Patient tolerance of procedure: Tolerated well, no immediate complications   Comments      Extra units medically necessary:  - 5 under left eye/ bridge of nose  - 5 under right eye/ bridge of nose  - 10 units between both cervical paraspinal muscles  - 10 L temporalis (anterior)  - 10 R temporalis (anterior)  - 15 scalp t/o  Avoided traps  Blood pressure 100/70, pulse 87, temperature (!) 97 4 °F (36 3 °C), temperature source Temporal, not currently breastfeeding  Sumatriptan injectable provides temporary relief, she will trial [facility] to see if this lasts longer

## 2022-08-30 ENCOUNTER — HOSPITAL ENCOUNTER (OUTPATIENT)
Dept: ULTRASOUND IMAGING | Facility: HOSPITAL | Age: 42
Discharge: HOME | End: 2022-08-30
Attending: OBSTETRICS & GYNECOLOGY
Payer: COMMERCIAL

## 2022-08-30 DIAGNOSIS — N83.201 RIGHT OVARIAN CYST: ICD-10-CM

## 2022-08-30 PROCEDURE — 76830 TRANSVAGINAL US NON-OB: CPT

## 2022-08-30 PROCEDURE — 76856 US EXAM PELVIC COMPLETE: CPT

## 2022-09-07 DIAGNOSIS — G43.709 CHRONIC MIGRAINE WITHOUT AURA: ICD-10-CM

## 2022-09-08 RX ORDER — RIMEGEPANT SULFATE 75 MG/75MG
TABLET, ORALLY DISINTEGRATING ORAL
Qty: 8 TABLET | Refills: 1 | Status: SHIPPED | OUTPATIENT
Start: 2022-09-08

## 2022-09-22 ENCOUNTER — TELEPHONE (OUTPATIENT)
Dept: NEUROLOGY | Facility: CLINIC | Age: 42
End: 2022-09-22

## 2022-09-22 NOTE — TELEPHONE ENCOUNTER
Pt left a vm today at 1:21 and stated that [facility] requires PA per pharmacy  Cb [phone]    PA initiated on [facility]    (Key: [id number])    Awaiting determination

## 2022-09-30 DIAGNOSIS — G43.709 CHRONIC MIGRAINE WITHOUT AURA WITHOUT STATUS MIGRAINOSUS, NOT INTRACTABLE: ICD-10-CM

## 2022-10-01 RX ORDER — SUMATRIPTAN 6 MG/.5ML
INJECTION, SOLUTION SUBCUTANEOUS
Qty: 2.5 ML | Refills: 0 | Status: SHIPPED | OUTPATIENT
Start: 2022-10-01 | End: 2022-10-28

## 2022-10-09 DIAGNOSIS — G43.709 CHRONIC MIGRAINE WITHOUT AURA WITHOUT STATUS MIGRAINOSUS, NOT INTRACTABLE: ICD-10-CM

## 2022-10-14 RX ORDER — ONABOTULINUMTOXINA 100 [USP'U]/1
INJECTION, POWDER, LYOPHILIZED, FOR SOLUTION INTRADERMAL; INTRAMUSCULAR
Qty: 2 EACH | Refills: 3 | Status: SHIPPED | OUTPATIENT
Start: 2022-10-14

## 2022-10-17 ENCOUNTER — HOSPITAL ENCOUNTER (OUTPATIENT)
Dept: RADIOLOGY | Age: 42
Discharge: HOME | End: 2022-10-17
Payer: COMMERCIAL

## 2022-10-17 VITALS — WEIGHT: 170 LBS | BODY MASS INDEX: 29.02 KG/M2 | HEIGHT: 64 IN

## 2022-10-17 DIAGNOSIS — Z12.31 SCREENING MAMMOGRAM, ENCOUNTER FOR: ICD-10-CM

## 2022-10-17 PROCEDURE — 77063 BREAST TOMOSYNTHESIS BI: CPT

## 2022-10-17 PROCEDURE — 77067 SCR MAMMO BI INCL CAD: CPT

## 2022-10-25 ENCOUNTER — TELEPHONE (OUTPATIENT)
Dept: NEUROLOGY | Facility: CLINIC | Age: 42
End: 2022-10-25

## 2022-10-25 NOTE — TELEPHONE ENCOUNTER
Spoke to Benito at [employer] and set up delivery for:    Botox: 100 units  Qty: 2  Delivery to [place]  Scheduled: 10/27/2022    Verbal consent was required  Conference call completed with Divya Gannon and verbal consent was given  Please advise if medication does not arrive

## 2022-10-27 DIAGNOSIS — G43.709 CHRONIC MIGRAINE WITHOUT AURA WITHOUT STATUS MIGRAINOSUS, NOT INTRACTABLE: ICD-10-CM

## 2022-10-28 RX ORDER — SUMATRIPTAN 100 MG/1
TABLET, FILM COATED ORAL
Qty: 9 TABLET | Refills: 2 | Status: SHIPPED | OUTPATIENT
Start: 2022-10-28

## 2022-10-28 RX ORDER — SUMATRIPTAN 6 MG/.5ML
INJECTION, SOLUTION SUBCUTANEOUS
Qty: 2.5 ML | Refills: 0 | Status: SHIPPED | OUTPATIENT
Start: 2022-10-28

## 2022-10-28 NOTE — TELEPHONE ENCOUNTER
Botox number of units: 100   Botox quantity: 2  Arrived at what location: [facility]   Botox at Correct Administering Location: yes}  NDC number: [id number]  Lot number: [id number]  Expiration Date: 11/2024  Appt notes indicate correct medication: yes

## 2022-11-14 ENCOUNTER — HOSPITAL ENCOUNTER (OUTPATIENT)
Dept: MAMMOGRAPHY | Facility: CLINIC | Age: 42
Discharge: HOME | End: 2022-11-14

## 2022-11-14 ENCOUNTER — HOSPITAL ENCOUNTER (OUTPATIENT)
Dept: ULTRASOUND IMAGING | Facility: CLINIC | Age: 42
Discharge: HOME | End: 2022-11-14

## 2022-11-14 VITALS — BODY MASS INDEX: 29.02 KG/M2 | WEIGHT: 170 LBS | HEIGHT: 64 IN

## 2022-11-14 DIAGNOSIS — R92.8 ABNORMAL SCREENING MAMMOGRAM: ICD-10-CM

## 2022-11-18 ENCOUNTER — PROCEDURE VISIT (OUTPATIENT)
Dept: NEUROLOGY | Facility: CLINIC | Age: 42
End: 2022-11-18

## 2022-11-18 VITALS — HEART RATE: 87 BPM | TEMPERATURE: 97.1 F | SYSTOLIC BLOOD PRESSURE: 110 MMHG | DIASTOLIC BLOOD PRESSURE: 80 MMHG

## 2022-11-18 DIAGNOSIS — G43.709 CHRONIC MIGRAINE WITHOUT AURA WITHOUT STATUS MIGRAINOSUS, NOT INTRACTABLE: Primary | ICD-10-CM

## 2022-11-18 NOTE — PROGRESS NOTES
Universal Protocol   Consent: Verbal consent obtained  Written consent obtained    Risks and benefits: risks, benefits and alternatives were discussed  Consent given by: patient  Patient understanding: patient states understanding of the procedure being performed  Patient consent: the patient's understanding of the procedure matches consent given  Procedure consent: procedure consent matches procedure scheduled        Chemodenervation     Date/Time 11/18/2022 3:32 PM     Performed by  Javier Callahan PA-C     Authorized by Javier Callahan PA-C        Pre-procedure details      Prepped With: Alcohol     Procedure details     Position:  Upright   Botox     Botox Type:  Type A    Brand:  Botox    mL's of Botulinum Toxin:  200    Final Concentration per CC:  100 units    Needle Gauge:  30 G 2 5 inch   Procedures     Botox Procedures: chronic headache      Indications: migraines     Injection Location      Head / Face:  L superior trapezius, R superior trapezius, L superior cervical paraspinal, R superior cervical paraspinal, L , R , procerus, L temporalis, R temporalis, R frontalis, L frontalis, R medial occipitalis and L medial occipitalis    L  injection amount:  5 unit(s)    R  injection amount:  5 unit(s)    L lateral frontalis:  5 unit(s)    R lateral frontalis:  5 unit(s)    L medial frontalis:  5 unit(s)    R medial frontalis:  5 unit(s)    L temporalis injection amount:  20 unit(s)    R temporalis injection amount:  20 unit(s)    Procerus injection amount:  5 unit(s)    L medial occipitalis injection amount:  15 unit(s)    R medial occipitalis injection amount:  15 unit(s)    L superior cervical paraspinal injection amount:  10 unit(s)    R superior cervical paraspinal injection amount:  10 unit(s)    L superior trapezius injection amount:  0 unit(s)    R superior trapezius injection amount:  0 unit(s)   Total Units     Total units used:  200    Total units discarded:  0 Post-procedure details      Chemodenervation:  Chronic migraine    Facial Nerve Location[de-identified]  Bilateral facial nerve    Patient tolerance of procedure: Tolerated well, no immediate complications   Comments      Extra units medically necessary:  - 5 under left eye/ bridge of nose  - 5 under right eye/ bridge of nose  - 15 units between both cervical paraspinal muscles  - 10 scalp/ apex  - 40 between both anterior temporalis muscles  Avoided traps  Blood pressure 110/80, pulse 87, temperature (!) 97 1 °F (36 2 °C), temperature source Temporal, last menstrual period 10/24/2022, not currently breastfeeding  Nurtec does help her, with mild nausea as s/e  She would like to continue it  States it helps reduce duration of migraines which potentially last 3 days

## 2022-12-01 ENCOUNTER — TELEPHONE (OUTPATIENT)
Dept: OBGYN CLINIC | Facility: CLINIC | Age: 42
End: 2022-12-01

## 2022-12-01 DIAGNOSIS — R10.2 PELVIC PAIN: ICD-10-CM

## 2022-12-01 DIAGNOSIS — N92.0 MENORRHAGIA WITH REGULAR CYCLE: Primary | ICD-10-CM

## 2022-12-01 NOTE — TELEPHONE ENCOUNTER
Pt has been having pain again more and more each month as well as increased bleeding with her period is asking if she can take the ocp twice a day as she has in the past
medically cleared as per ER

## 2022-12-05 ENCOUNTER — PATIENT MESSAGE (OUTPATIENT)
Dept: OBGYN CLINIC | Facility: CLINIC | Age: 42
End: 2022-12-05

## 2022-12-05 DIAGNOSIS — N92.0 MENORRHAGIA WITH REGULAR CYCLE: Primary | ICD-10-CM

## 2022-12-06 RX ORDER — ACETAMINOPHEN AND CODEINE PHOSPHATE 120; 12 MG/5ML; MG/5ML
2 SOLUTION ORAL DAILY
Qty: 56 TABLET | Refills: 11 | Status: SHIPPED | OUTPATIENT
Start: 2022-12-06

## 2022-12-26 DIAGNOSIS — G43.709 CHRONIC MIGRAINE WITHOUT AURA WITHOUT STATUS MIGRAINOSUS, NOT INTRACTABLE: ICD-10-CM

## 2022-12-26 DIAGNOSIS — G43.701 CHRONIC MIGRAINE WITHOUT AURA WITH STATUS MIGRAINOSUS, NOT INTRACTABLE: ICD-10-CM

## 2022-12-27 RX ORDER — GABAPENTIN 100 MG/1
CAPSULE ORAL
Qty: 90 CAPSULE | Refills: 2 | Status: SHIPPED | OUTPATIENT
Start: 2022-12-27

## 2022-12-27 RX ORDER — SUMATRIPTAN 6 MG/.5ML
INJECTION, SOLUTION SUBCUTANEOUS
Qty: 2.5 ML | Refills: 0 | Status: SHIPPED | OUTPATIENT
Start: 2022-12-27

## 2022-12-28 ENCOUNTER — TELEPHONE (OUTPATIENT)
Dept: NEUROLOGY | Facility: CLINIC | Age: 42
End: 2022-12-28

## 2022-12-28 NOTE — TELEPHONE ENCOUNTER
Fax received from pharmacy requesting PA renewal for nurtec  ID: [id number]  GROUP: [id number]  PCN: [id number]  BIN: [id number]    PA submitted, awaiting determination

## 2022-12-29 NOTE — TELEPHONE ENCOUNTER
Sharma:  Cassandra Hodgson approved  Request Reference Number: [id number]  NURTEC TAB 75MG ODT is approved through 12/28/2023  Your patient may now fill this prescription and it will be covered

## 2023-01-16 DIAGNOSIS — N92.0 MENORRHAGIA WITH REGULAR CYCLE: ICD-10-CM

## 2023-01-16 RX ORDER — NORETHINDRONE 0.35 MG/1
1 TABLET ORAL 2 TIMES DAILY
Qty: 168 TABLET | Refills: 3 | Status: SHIPPED | OUTPATIENT
Start: 2023-01-16

## 2023-01-18 ENCOUNTER — TELEPHONE (OUTPATIENT)
Dept: NEUROLOGY | Facility: CLINIC | Age: 43
End: 2023-01-18

## 2023-01-18 NOTE — TELEPHONE ENCOUNTER
Patient has upcoming appt for botox with [facility] on 02/17/23 please look into auth and referral

## 2023-02-03 ENCOUNTER — TELEPHONE (OUTPATIENT)
Dept: NEUROLOGY | Facility: CLINIC | Age: 43
End: 2023-02-03

## 2023-02-03 NOTE — TELEPHONE ENCOUNTER
Contacted [employer], spoke to [facility] that advised pt has high copay and will need to pay before shipping; she contacted pt, pt paid co-pay of 1000+ and delivery set up for 2/7/2023  Botox 200 UNITS  Qty  1  Scheduled:2/7/2023  Location:[facility]  Via: [employer]    Please advise if Botox does not arrive  Thank you

## 2023-02-07 NOTE — TELEPHONE ENCOUNTER
Botox number of units: 100   Botox quantity: 2  Arrived at what location: [facility]   Botox at Correct Administering Location: yes  NDC number: 1462255756  Lot number: [id number], and [id number]  Expiration Date:05/2025 and 03/2025  Appt notes indicate correct medication: yes

## 2023-02-14 ENCOUNTER — TELEPHONE (OUTPATIENT)
Dept: NEUROLOGY | Facility: CLINIC | Age: 43
End: 2023-02-14

## 2023-02-17 ENCOUNTER — PROCEDURE VISIT (OUTPATIENT)
Dept: NEUROLOGY | Facility: CLINIC | Age: 43
End: 2023-02-17

## 2023-02-17 VITALS — DIASTOLIC BLOOD PRESSURE: 70 MMHG | TEMPERATURE: 97.5 F | HEART RATE: 105 BPM | SYSTOLIC BLOOD PRESSURE: 120 MMHG

## 2023-02-17 DIAGNOSIS — G43.709 CHRONIC MIGRAINE WITHOUT AURA WITHOUT STATUS MIGRAINOSUS, NOT INTRACTABLE: Primary | ICD-10-CM

## 2023-02-17 NOTE — PROGRESS NOTES
Universal Protocol   Consent: Verbal consent obtained  Written consent obtained    Risks and benefits: risks, benefits and alternatives were discussed  Consent given by: patient  Patient understanding: patient states understanding of the procedure being performed  Patient consent: the patient's understanding of the procedure matches consent given  Procedure consent: procedure consent matches procedure scheduled        Chemodenervation     Date/Time 2/17/2023 2:59 PM     Performed by  Reyes Payton PA-C     Authorized by Reyes Payton PA-C        Pre-procedure details      Prepped With: Alcohol     Procedure details     Position:  Upright   Botox     Botox Type:  Type A    Brand:  Botox    mL's of Botulinum Toxin:  190    Final Concentration per CC:  100 units    Needle Gauge:  30 G 2 5 inch   Procedures     Botox Procedures: chronic headache      Indications: migraines     Injection Location      Head / Face:  L superior trapezius, R superior trapezius, L superior cervical paraspinal, R superior cervical paraspinal, L , R , procerus, L temporalis, R temporalis, R frontalis, L frontalis, R medial occipitalis and L medial occipitalis    L  injection amount:  5 unit(s)    R  injection amount:  5 unit(s)    L lateral frontalis:  5 unit(s)    R lateral frontalis:  5 unit(s)    L medial frontalis:  5 unit(s)    R medial frontalis:  5 unit(s)    L temporalis injection amount:  20 unit(s)    R temporalis injection amount:  20 unit(s)    Procerus injection amount:  5 unit(s)    L medial occipitalis injection amount:  15 unit(s)    R medial occipitalis injection amount:  15 unit(s)    L superior cervical paraspinal injection amount:  10 unit(s)    R superior cervical paraspinal injection amount:  10 unit(s)    L superior trapezius injection amount:  0 unit(s)    R superior trapezius injection amount:  0 unit(s)   Total Units     Total units used:  190    Total units discarded:  10 Post-procedure details      Chemodenervation:  Chronic migraine    Facial Nerve Location[de-identified]  Bilateral facial nerve    Patient tolerance of procedure: Tolerated well, no immediate complications   Comments      Extra units medically necessary:  - 5 under left eye/ bridge of nose  - 5 under right eye/ bridge of nose  - 15 units between both cervical paraspinal muscles   - 40 between both anterior temporalis muscles  Avoided traps  Blood pressure 120/70, pulse 105, temperature 97 5 °F (36 4 °C), temperature source Temporal, not currently breastfeeding  Pt is doing very well with the nurtec  States it keeps the migraine headaches away, no rebound HA  Botox continues to significantly reduce migraines

## 2023-02-17 NOTE — PATIENT INSTRUCTIONS
Botox Therapy  Important Information    Our goal is to make sure you fully understand how Botox Therapy treatment may benefit you and to help you understand how you can play an active role in your treatments and ongoing care  Please review the following information below  Call our office IMMEDIATELY @ [phone] and speak to one of our Botox Coordinators if you have a change in insurance  (a prior authorization is required and accurate information is vital)  Please call at least 24 hours in advance if you can't make your appointment  Appointments are scheduled every 91 days (this will be scheduled in advance before leaving the office)  You must allow for at least 2-3 treatments to determine if Botox is right for you  It may take a few weeks to see a response from treatment  No hair dye or scalp massage or treatment within 24 hours of treatment  We encourage you to use a headache diary or journal to document your headache frequency and severity  You can also utilize the Migraine Arnulfo joey (downloadable on [employer])  Sign up for the Botox Savings Program  (commercial insurance patients may qualify) Sign up at [employer] or call [phone] Option: 4  To get more information on Botox therapy for Chronic Migraines and see frequently asked questions, please visit [employer]  If you have any questions or concerns, please speak to one of our Botox Coordinators at [phone]  We look forward to servicing you!

## 2023-03-28 ENCOUNTER — OFFICE VISIT (OUTPATIENT)
Dept: FAMILY MEDICINE CLINIC | Facility: CLINIC | Age: 43
End: 2023-03-28

## 2023-03-28 VITALS
OXYGEN SATURATION: 98 % | HEART RATE: 100 BPM | DIASTOLIC BLOOD PRESSURE: 80 MMHG | SYSTOLIC BLOOD PRESSURE: 122 MMHG | HEIGHT: 64 IN | RESPIRATION RATE: 16 BRPM | WEIGHT: 166 LBS | BODY MASS INDEX: 28.34 KG/M2

## 2023-03-28 DIAGNOSIS — J20.9 ACUTE TRACHEOBRONCHITIS: Primary | ICD-10-CM

## 2023-03-28 RX ORDER — PREDNISONE 10 MG/1
TABLET ORAL
Qty: 18 TABLET | Refills: 0 | Status: SHIPPED | OUTPATIENT
Start: 2023-03-28

## 2023-03-28 RX ORDER — OMEPRAZOLE 40 MG/1
40 CAPSULE, DELAYED RELEASE ORAL DAILY
Qty: 30 CAPSULE | Refills: 0 | Status: SHIPPED | OUTPATIENT
Start: 2023-03-28

## 2023-03-28 RX ORDER — CEFDINIR 300 MG/1
300 CAPSULE ORAL EVERY 12 HOURS SCHEDULED
Qty: 20 CAPSULE | Refills: 0 | Status: SHIPPED | OUTPATIENT
Start: 2023-03-28 | End: 2023-04-07

## 2023-03-28 NOTE — PROGRESS NOTES
"Assessment/Plan: Infectious versus inflammatory versus allergic versus heartburn I will treat all of it    1  Acute tracheobronchitis  -     cefdinir (OMNICEF) 300 mg capsule; Take 1 capsule (300 mg total) by mouth every 12 (twelve) hours for 10 days  -     predniSONE 10 mg tablet; Take 1 tablet tid x3d then 1 tab bid x3d then 1 tab qd x3d  -     omeprazole (PriLOSEC) 40 MG capsule; Take 1 capsule (40 mg total) by mouth daily       Subjective:      Patient ID: Keira Adorno is a 38 y o  female  HPI   (A few weeks ago patient noticed when she was eating at night with anything acidic or with vinegar her throat would start itching inside and out and she had to take benadryl   It's been acting up again and she's not sure what's going on)    Since march 7th     Only med change was 2 progestin only pills x 2 months       The following portions of the patient's history were reviewed and updated as appropriate: allergies, current medications, past family history, past medical history, past social history, past surgical history and problem list     Review of Systems   All other systems reviewed and are negative  Objective:      /80 (BP Location: Left arm, Patient Position: Sitting, Cuff Size: Large)   Pulse 100   Resp 16   Ht 5' 4\" (1 626 m)   Wt 75 3 kg (166 lb)   SpO2 98%   BMI 28 49 kg/m²     No visits with results within 2 Week(s) from this visit     Latest known visit with results is:   Office Visit on 05/04/2022   Component Date Value   • Case Report 05/04/2022                      Value:Gynecologic Cytology Report                       Case: [id number]                                  Authorizing Provider:  Lily Byers MD   Collected:           05/04/2022 1031              Ordering Location:     [facility]     Received:            05/04/2022 1031                                     [facility]                                                                         " [place]                                                                       First Screen:          Nelida Moritz, CT                                                       Pathologist:           Jonny Mathews MD                                                                  Specimen:    LIQUID-BASED PAP, SCREENING, Cervix                                                       • Primary Interpretation 05/04/2022 Epithelial cell abnormality    • Interpretation 05/04/2022 Atypical squamous cells of undetermined significance    • Specimen Adequacy 05/04/2022 Satisfactory for evaluation  Endocervical/transformation zone component present  • Additional Information 05/04/2022                      Value: This result contains rich text formatting which cannot be displayed here  • HPV Other HR 05/04/2022 Negative    • HPV16 05/04/2022 Negative    • HPV18 05/04/2022 Negative           Physical Exam  Vitals and nursing note reviewed  Constitutional:       Appearance: She is well-developed  HENT:      Head: Normocephalic and atraumatic  Cardiovascular:      Rate and Rhythm: Normal rate and regular rhythm  Heart sounds: Normal heart sounds  No murmur heard  Pulmonary:      Effort: Pulmonary effort is normal       Breath sounds: Normal breath sounds  No wheezing (end expiratory wheeze left upper lobe ) or rales  Abdominal:      General: Bowel sounds are normal  There is no distension  Palpations: Abdomen is soft  Tenderness: There is no abdominal tenderness  Musculoskeletal:         General: No tenderness  Normal range of motion  Cervical back: Normal range of motion and neck supple  Lymphadenopathy:      Cervical: No cervical adenopathy  Skin:     General: Skin is warm and dry  Capillary Refill: Capillary refill takes less than 2 seconds  Findings: No rash  Neurological:      Mental Status: She is alert and oriented to person, place, and time  Cranial Nerves:  No cranial nerve deficit  Sensory: No sensory deficit  Motor: No abnormal muscle tone  Psychiatric:         Behavior: Behavior normal          Thought Content:  Thought content normal          Judgment: Judgment normal              Hermelinda SpeaksMD Alme[address]

## 2023-04-25 ENCOUNTER — TELEPHONE (OUTPATIENT)
Dept: NEUROLOGY | Facility: CLINIC | Age: 43
End: 2023-04-25

## 2023-04-25 NOTE — TELEPHONE ENCOUNTER
Spoke to Juel Gilford with [address] she advised that pt consent is on file but will need to contact pt due to bal of 200+

## 2023-04-26 NOTE — TELEPHONE ENCOUNTER
Message from 4/25 at 3:32    Marsha Pina from [address] [place] [address] left a VM re: scheduling Botox delivery  Transcribed VM:   Hello, my name is Marsha Pina  I'm calling from [facility]  I'm calling to schedule delivery of Botox for patient Becca Minaya  Date of birth October 16th, 1980  If someone can please return my call to confirm a shipping address and delivery date  My phone number at [address] [place] [address] is [phone]  And our business hours are Monday through Friday  7 AM to 7 PM Central Standard Time  Thank you

## 2023-04-27 ENCOUNTER — OFFICE VISIT (OUTPATIENT)
Dept: FAMILY MEDICINE CLINIC | Facility: CLINIC | Age: 43
End: 2023-04-27

## 2023-04-27 VITALS
WEIGHT: 159.6 LBS | OXYGEN SATURATION: 99 % | SYSTOLIC BLOOD PRESSURE: 118 MMHG | HEART RATE: 96 BPM | DIASTOLIC BLOOD PRESSURE: 78 MMHG | HEIGHT: 64 IN | BODY MASS INDEX: 27.25 KG/M2 | TEMPERATURE: 99.4 F

## 2023-04-27 DIAGNOSIS — R10.32 CHRONIC LLQ PAIN: ICD-10-CM

## 2023-04-27 DIAGNOSIS — R10.2 PELVIC PAIN: Primary | ICD-10-CM

## 2023-04-27 DIAGNOSIS — K59.00 CONSTIPATION, UNSPECIFIED CONSTIPATION TYPE: ICD-10-CM

## 2023-04-27 DIAGNOSIS — G89.29 CHRONIC LLQ PAIN: ICD-10-CM

## 2023-04-27 RX ORDER — ONABOTULINUMTOXINA 100 [USP'U]/1
INJECTION, POWDER, LYOPHILIZED, FOR SOLUTION INTRADERMAL; INTRAMUSCULAR
COMMUNITY
Start: 2023-04-25

## 2023-04-27 NOTE — TELEPHONE ENCOUNTER
Contacted [employer], spoke to Shyam Fisher  She advised pt Botox order is ready for delivery 5/2/2023  Botox 200 UNITS  Qty  1  Scheduled:5/2/2023  Location:[facility]   Via: [facility]/[employer]    Please advise if Botox does not arrive  Thank you

## 2023-04-27 NOTE — ASSESSMENT & PLAN NOTE
S/p Oophorectomy for ovarian cyst  Continues to experience intermittent episodes of excruciating pain which she rates as 9/ 10 when it is worse  Symptoms resolved with rest, heating pain and over-the-counter NSAID  Patient has been experiencing some gynecological issues for which she is being monitored by her gynecologist   Noted to have a right ovarian cyst which is currently being monitored by her gynecologist   Patient underwent a CAT scan in 2022 which revealed - the left gonadal veins are dilated, increased compared to the prior study   Additionally, there is stranding of the fat adjacent to the mid to   [address] left gonadal veins and anteromedial to the left psoas muscle  Also noted to have mild urothelial enhancement of the mid left ureter, likely related to the above-described inflammatory change surrounding the left gonadal veins     Patient also reports worsening pain with bowel movements  Patient reports chronic constipation, worsening abdominal pain with bowel movements  Patient is being monitored by gynecologist, undergoing pelvic ultrasound for right ovarian cyst   Recommended to repeat the CAT scan and also advised GI evaluation for chronic constipation

## 2023-04-27 NOTE — PROGRESS NOTES
Subjective:      Patient ID: Mery Swann is a 38 y o  female  HPI    Patient is here reporting symptoms of left lower abdomen/pelvic pain which has been going on for almost 2 years  At the time patient was diagnosed with 13 cm left ovarian cyst for which she underwent oophorectomy  Since then patient continues to experience left-sided pelvic pain  She presented to the ER in January 2022 where she underwent a CAT scan which revealed - the left gonadal veins are dilated, increased compared to the prior study   Additionally, there is stranding of the fat adjacent to the mid to    distal left gonadal veins and anteromedial to the left psoas muscle  Also noted to have mild urothelial enhancement of the mid left ureter, likely related to the above-described inflammatory change surrounding the left gonadal veins    This may possibly be related to phlebitis of the left gonadal veins  Patient reports chronic constipation, with worsening abdominal pain with bowel movements  Patient is being monitored by gynecologist, undergoing pelvic ultrasound for right ovarian cyst   She is very frustrated with her symptoms, looking for answers           Past Medical History:   Diagnosis Date   • Abnormal Pap smear of cervix     In her early 19's   • Back pain    • Irregular heart beat     Palpitations   • Menorrhagia    • Migraines        Family History   Problem Relation Age of Onset   • No Known Problems Mother    • No Known Problems Father    • No Known Problems Daughter    • Dementia Maternal Grandmother    • Heart disease Maternal Grandmother    • Diabetes Maternal Grandmother    • No Known Problems Maternal Grandfather    • No Known Problems Paternal Grandmother    • Heart disease Paternal Grandfather    • No Known Problems Brother    • No Known Problems Son        Past Surgical History:   Procedure Laterality Date   • OOPHORECTOMY Left 12/7/2020    Procedure: SALPINGO-OOPHORECTOMY, LAPAROSCOPIC;  Surgeon: Emiliano Fernandez Ellie Gómez DO;  [facility];  Service: Gynecology   • OVARIAN CYST SURGERY     • DE HYSTEROSCOPY BX ENDOMETRIUM&/POLYPC W/WO D&C N/A 11/9/2017    Procedure: DILATATION AND CURETTAGE (D&C) WITH HYSTEROSCOPY;  Surgeon: Mya Christine DO;  [facility];  Service: Gynecology   • DE HYSTEROSCOPY ENDOMETRIAL ABLATION N/A 11/9/2017    Procedure: ABLATION ENDOMETRIAL  Manasa Esther;  Surgeon: Mya Christine DO;  [facility];  Service: Gynecology   • DE LAPS ABD PRTM&OMENTUM DX W/WO SPEC BR/WA SPX N/A 12/7/2020    Procedure: LAPAROSCOPY DIAGNOSTIC;  Surgeon: Jaci Quinones DO;  [facility];  Service: Gynecology   • WISDOM TOOTH EXTRACTION          reports that she has never smoked  She has never used smokeless tobacco  She reports that she does not drink alcohol and does not use drugs  Current Outpatient Medications:   •  Botox 100 units, , Disp: , Rfl:   •  chlorhexidine (PERIDEX) 0 12 % solution, , Disp: , Rfl:   •  Coenzyme Q10 (COQ10) 100 MG CAPS, 1 cap qd  , Disp: 30 capsule, Rfl: 2  •  gabapentin (NEURONTIN) 100 mg capsule, TAKE 1 CAPSULE BY MOUTH AT BEDTIME, Disp: 90 capsule, Rfl: 2  •  Incassia 0 35 MG tablet, TAKE 1 TABLET (0 35 MG TOTAL) BY MOUTH 2 (TWO) TIMES A DAY, Disp: 168 tablet, Rfl: 3  •  loratadine (CLARITIN) 10 mg tablet, TAKE 1 TABLET BY MOUTH EVERY DAY, Disp: 90 tablet, Rfl: 1  •  Nurtec 75 MG TBDP, TAKE 1 TAB AT MIGRAINE ONSET  NO MORE THAN ONE DOSE PER 24 HOURS  HOLD TRIPTAN , Disp: 8 tablet, Rfl: 5  •  omeprazole (PriLOSEC) 40 MG capsule, Take 1 capsule (40 mg total) by mouth daily, Disp: 30 capsule, Rfl: 0  •  predniSONE 10 mg tablet, Take 1 tablet tid x3d then 1 tab bid x3d then 1 tab qd x3d, Disp: 18 tablet, Rfl: 0  •  SODIUM FLUORIDE, DENTAL RINSE, 0 2 % SOLN, , Disp: , Rfl:   •  SUMAtriptan (IMITREX) 100 mg tablet, TAKE 1 TABLET AT MIGRAINE ONSET   MAY REPEAT IN 2 HOURS IF NEEDED (MAX 2TABS/DAY, 3TABS/WEEK), Disp: 9 tablet, Rfl: 5  •  SUMAtriptan "(IMITREX) 6 mg/0 5 mL, INJECT CONTENTS OF 1 VIAL UNDER SKIN FOR 1 DOSE, Disp: 2 5 mL, Rfl: 0  •  Syringe/Needle, Disp, (SYRINGE 3CC/06UW4-9/4\") 27G X 1-1/4\" 3 ML MISC, Use for Toradol intramuscular injections  , Disp: 3 each, Rfl: 2  •  TiZANidine (ZANAFLEX) 2 MG capsule, TAKE 1 CAPSULE BY MOUTH THREE TIMES A DAY AS NEEDED FOR MUSCLE SPASM, Disp: 270 capsule, Rfl: 1  •  estradiol (ESTRACE) 0 1 mg/g vaginal cream, Insert 0 5 g into the vagina daily for 14 days, THEN 0 5 g 2 (two) times a week , Disp: 42 5 g, Rfl: 3    Current Facility-Administered Medications:   •  Botulinum Toxin Type A SOLR 200 Units, 200 Units, Injection, Q3 Months, Ariel Dickerson PA-C, 200 Units at 02/17/23 1523    The following portions of the patient's history were reviewed and updated as appropriate: allergies, current medications, past family history, past medical history, past social history, past surgical history and problem list     Review of Systems   Constitutional: Negative  Negative for chills and fever  Gastrointestinal: Positive for abdominal pain and constipation  Negative for nausea and vomiting  Musculoskeletal: Negative  Objective:    /78   Pulse 96   Temp 99 4 °F (37 4 °C)   Ht 5' 4\" (1 626 m)   Wt 72 4 kg (159 lb 9 6 oz)   SpO2 99%   BMI 27 40 kg/m²      Physical Exam  Constitutional:       Appearance: She is well-developed  Cardiovascular:      Heart sounds: Normal heart sounds  Pulmonary:      Breath sounds: Normal breath sounds  Abdominal:      General: Abdomen is flat  Bowel sounds are normal       Tenderness: There is abdominal tenderness in the left lower quadrant  Hernia: No hernia is present  No results found for this or any previous visit (from the past 1008 hour(s))  Assessment/Plan:    No problem-specific Assessment & Plan notes found for this encounter             Problem List Items Addressed This Visit        Other    Pelvic pain - Primary    Relevant Orders    CT " abdomen pelvis w contrast    Ambulatory Referral to Gastroenterology    Constipation    Relevant Orders    Ambulatory Referral to Gastroenterology    Chronic LLQ pain     S/p Oophorectomy for ovarian cyst  Continues to experience intermittent episodes of excruciating pain which she rates as 9/ 10 when it is worse  Symptoms resolved with rest, heating pain and over-the-counter NSAID  Patient has been experiencing some gynecological issues for which she is being monitored by her gynecologist   Noted to have a right ovarian cyst which is currently being monitored by her gynecologist   Patient underwent a CAT scan in 2022 which revealed - the left gonadal veins are dilated, increased compared to the prior study   Additionally, there is stranding of the fat adjacent to the mid to   [address] left gonadal veins and anteromedial to the left psoas muscle  Also noted to have mild urothelial enhancement of the mid left ureter, likely related to the above-described inflammatory change surrounding the left gonadal veins     Patient also reports worsening pain with bowel movements  Patient reports chronic constipation, worsening abdominal pain with bowel movements  Patient is being monitored by gynecologist, undergoing pelvic ultrasound for right ovarian cyst   Recommended to repeat the CAT scan and also advised GI evaluation for chronic constipation  Relevant Orders    CT abdomen pelvis w contrast    Ambulatory Referral to Gastroenterology     I have spent a total time of 25 minutes on 04/27/23 in caring for this patient including Diagnostic results, Prognosis, Risks and benefits of tx options, Instructions for management, Patient and family education, Importance of tx compliance, Risk factor reductions, Impressions, Counseling / Coordination of care, Documenting in the medical record, Reviewing / ordering tests, medicine, procedures   and Obtaining or reviewing history

## 2023-04-28 NOTE — TELEPHONE ENCOUNTER
Botox number of units: 100  Botox quantity: 2  Arrived at what location: [facility]   Botox at Correct Administering Location: yes  NDC number:[id number]  Lot MADELIN:[id number]  Expiration Date: 05/2025  Appt notes indicate correct medication: yes

## 2023-05-10 NOTE — PROGRESS NOTES
Patient presents for a routine annual visit  Menarche- 16Y/O  Last Pap Smear- 5/4/22 ASCUS neg HPV  Repeat today  LMP-5/11/23  Birth control-OCP  Mammogram-  10/17/22 required f/u of R breast  Mammo diag and US completed 11/14/22 bi-rads 3 likely benign  Repeat US of breast scheduled for today 5/15  [id number]  Non smoker  Social drinker  Currently sexually active  No family history of uterine, ovarian, cervical or breast cancer    Reports irregular cycles, heavy and crampy  Would like to discuss further       Also needs Estrace refill

## 2023-05-11 ENCOUNTER — HOSPITAL ENCOUNTER (OUTPATIENT)
Dept: CT IMAGING | Facility: HOSPITAL | Age: 43
Discharge: HOME | End: 2023-05-11

## 2023-05-11 DIAGNOSIS — G89.29 CHRONIC LLQ PAIN: ICD-10-CM

## 2023-05-11 DIAGNOSIS — R10.32 CHRONIC LLQ PAIN: ICD-10-CM

## 2023-05-11 DIAGNOSIS — R10.2 PELVIC PAIN: ICD-10-CM

## 2023-05-11 RX ADMIN — IOHEXOL 100 ML: 350 INJECTION, SOLUTION INTRAVENOUS at 12:52

## 2023-05-15 ENCOUNTER — HOSPITAL ENCOUNTER (OUTPATIENT)
Dept: ULTRASOUND IMAGING | Facility: CLINIC | Age: 43
Discharge: HOME | End: 2023-05-15

## 2023-05-15 ENCOUNTER — ANNUAL EXAM (OUTPATIENT)
Dept: OBGYN CLINIC | Facility: CLINIC | Age: 43
End: 2023-05-15

## 2023-05-15 VITALS
WEIGHT: 156 LBS | HEIGHT: 63 IN | BODY MASS INDEX: 27.64 KG/M2 | SYSTOLIC BLOOD PRESSURE: 138 MMHG | DIASTOLIC BLOOD PRESSURE: 78 MMHG

## 2023-05-15 DIAGNOSIS — Z12.31 SCREENING MAMMOGRAM, ENCOUNTER FOR: ICD-10-CM

## 2023-05-15 DIAGNOSIS — R92.8 MAMMOGRAM ABNORMAL: ICD-10-CM

## 2023-05-15 DIAGNOSIS — N95.2 VAGINAL ATROPHY: ICD-10-CM

## 2023-05-15 DIAGNOSIS — R87.610 ASCUS OF CERVIX WITH NEGATIVE HIGH RISK HPV: ICD-10-CM

## 2023-05-15 DIAGNOSIS — Z01.419 ENCOUNTER FOR ANNUAL ROUTINE GYNECOLOGICAL EXAMINATION: Primary | ICD-10-CM

## 2023-05-15 RX ORDER — ESTRADIOL 0.1 MG/G
0.5 CREAM VAGINAL 2 TIMES WEEKLY
Qty: 42.5 G | Refills: 3 | Status: SHIPPED | OUTPATIENT
Start: 2023-05-15

## 2023-05-15 NOTE — PROGRESS NOTES
Assessment/Plan:    Encounter for annual routine gynecological examination  Pap/HPV collected  Mammogram scheduled for today    Pelvic pain, menorrhagia -just had CT appears to be cyst will await final report  Options: Dx laparoscopy, removal of cyst, possible hysterectomy, change from progestin pill to gnrh agonist,  IUD ( decline)    Encourage healthy diet, exercise, Calcium 1200mg per day and at least 800 iu Vitamin D daily  Diagnoses and all orders for this visit:    Encounter for annual routine gynecological examination    ASCUS of cervix with negative high risk HPV  -     Liquid-based pap, screening    Screening mammogram, encounter for  -     Mammo screening bilateral w 3d & cad; Future          Subjective:      Patient ID: Heraclio Quiñonez is a 38 y o  female  Patient presents for a routine annual visit  Menarche- 14Y/O  Last Pap Smear- 5/4/22 ASCUS neg HPV  Repeat today  LMP-5/11/23  Birth control-OCP  Mammogram-  10/17/22 required f/u of R breast  Mammo diag and US completed 11/14/22 bi-rads 3 likely benign  Repeat US of breast scheduled for today 5/15  [id number]  Non smoker  Social drinker  Currently sexually active  No family history of uterine, ovarian, cervical or breast cancer    Reports irregular cycles, heavy and crampy  Would like to discuss further   - some improvement with double up of progestin only pill ( CORETTA causes migraines) had ablation with no effect  Pelvic pain present - h/o ovarian cyst which had resolved  Current CT image not finalized but cyst appears to be present  Also needs Estrace refill  Gynecologic Exam  She complains of pelvic pain and vaginal bleeding  She reports no genital itching, genital lesions, genital odor, genital rash, missed menses or vaginal discharge  This is a recurrent problem  The current episode started more than 1 year ago  The problem occurs intermittently  The problem has been gradually worsening since onset  The pain is moderate   The problem affects the right side  Pertinent negatives include no chills, constipation, diarrhea, fever, nausea, sore throat or vomiting  The vaginal bleeding is heavier than menses  Patient has been passing clots  Patient has not been passing tissue  The symptoms are aggravated by activity and tactile pressure  She is sexually active  The patient's menstrual history has been regular  The following portions of the patient's history were reviewed and updated as appropriate:   She  has a past medical history of Abnormal Pap smear of cervix, Back pain, Irregular heart beat, Menorrhagia, and Migraines  She   Patient Active Problem List    Diagnosis Date Noted   • Pelvic pain 04/27/2023   • Constipation 04/27/2023   • Chronic LLQ pain 04/27/2023   • Encounter for annual routine gynecological examination 05/06/2022   • Right ovarian cyst 05/06/2022   • Chronic migraine without aura 02/03/2021   • Menstrual migraine without status migrainosus, not intractable 01/15/2021   • Murmur 01/12/2021   • Family history of first degree relative with bicuspid aortic valve 01/11/2021   • Myofascial pain syndrome, cervical 06/27/2019   • Chronic migraine without aura without status migrainosus, not intractable 03/19/2018   • Headache 01/09/2014     She  has a past surgical history that includes [place] tooth extraction; Ovarian cyst surgery; pr hysteroscopy endometrial ablation (N/A, 11/9/2017); pr hysteroscopy bx endometrium&/polypc w/wo d&c (N/A, 11/9/2017); pr laps abd prtm&omentum dx w/wo spec br/wa spx (N/A, 12/7/2020); and Oophorectomy (Left, 12/7/2020)  Her family history includes Dementia in her maternal grandmother; Diabetes in her maternal grandmother; Heart disease in her maternal grandmother and paternal grandfather; No Known Problems in her brother, daughter, father, maternal grandfather, mother, paternal grandmother, and son  She  reports that she has never smoked   She has never used smokeless tobacco  She reports "that she does not drink alcohol and does not use drugs  Current Outpatient Medications   Medication Sig Dispense Refill   • Botox 100 units      • chlorhexidine (PERIDEX) 0 12 % solution      • Coenzyme Q10 (COQ10) 100 MG CAPS 1 cap qd  30 capsule 2   • estradiol (ESTRACE) 0 1 mg/g vaginal cream Insert 0 5 g into the vagina daily for 14 days, THEN 0 5 g 2 (two) times a week  42 5 g 3   • gabapentin (NEURONTIN) 100 mg capsule TAKE 1 CAPSULE BY MOUTH AT BEDTIME 90 capsule 2   • Incassia 0 35 MG tablet TAKE 1 TABLET (0 35 MG TOTAL) BY MOUTH 2 (TWO) TIMES A  tablet 3   • Nurtec 75 MG TBDP TAKE 1 TAB AT MIGRAINE ONSET  NO MORE THAN ONE DOSE PER 24 HOURS  HOLD TRIPTAN  8 tablet 5   • SUMAtriptan (IMITREX) 100 mg tablet TAKE 1 TABLET AT MIGRAINE ONSET  MAY REPEAT IN 2 HOURS IF NEEDED (MAX 2TABS/DAY, 3TABS/WEEK) 9 tablet 5   • SUMAtriptan (IMITREX) 6 mg/0 5 mL INJECT CONTENTS OF 1 VIAL UNDER SKIN FOR 1 DOSE 2 5 mL 0   • Syringe/Needle, Disp, (SYRINGE 3CC/98WB1-0/4\") 27G X 1-1/4\" 3 ML MISC Use for Toradol intramuscular injections   3 each 2   • loratadine (CLARITIN) 10 mg tablet TAKE 1 TABLET BY MOUTH EVERY DAY (Patient not taking: Reported on 5/15/2023) 90 tablet 1   • omeprazole (PriLOSEC) 40 MG capsule Take 1 capsule (40 mg total) by mouth daily (Patient not taking: Reported on 5/15/2023) 30 capsule 0   • predniSONE 10 mg tablet Take 1 tablet tid x3d then 1 tab bid x3d then 1 tab qd x3d (Patient not taking: Reported on 5/15/2023) 18 tablet 0   • SODIUM FLUORIDE, DENTAL RINSE, 0 2 % SOLN  (Patient not taking: Reported on 5/15/2023)     • TiZANidine (ZANAFLEX) 2 MG capsule TAKE 1 CAPSULE BY MOUTH THREE TIMES A DAY AS NEEDED FOR MUSCLE SPASM (Patient not taking: Reported on 5/15/2023) 270 capsule 1     Current Facility-Administered Medications   Medication Dose Route Frequency Provider Last Rate Last Admin   • Botulinum Toxin Type A SOLR 200 Units  200 Units Injection Q3 Months Uriel Husain PA-C   200 Units at " "02/17/23 1523     Current Outpatient Medications on File Prior to Visit   Medication Sig   • Botox 100 units    • chlorhexidine (PERIDEX) 0 12 % solution    • Coenzyme Q10 (COQ10) 100 MG CAPS 1 cap qd  • estradiol (ESTRACE) 0 1 mg/g vaginal cream Insert 0 5 g into the vagina daily for 14 days, THEN 0 5 g 2 (two) times a week  • gabapentin (NEURONTIN) 100 mg capsule TAKE 1 CAPSULE BY MOUTH AT BEDTIME   • Incassia 0 35 MG tablet TAKE 1 TABLET (0 35 MG TOTAL) BY MOUTH 2 (TWO) TIMES A DAY   • Nurtec 75 MG TBDP TAKE 1 TAB AT MIGRAINE ONSET  NO MORE THAN ONE DOSE PER 24 HOURS  HOLD TRIPTAN  • SUMAtriptan (IMITREX) 100 mg tablet TAKE 1 TABLET AT MIGRAINE ONSET  MAY REPEAT IN 2 HOURS IF NEEDED (MAX 2TABS/DAY, 3TABS/WEEK)   • SUMAtriptan (IMITREX) 6 mg/0 5 mL INJECT CONTENTS OF 1 VIAL UNDER SKIN FOR 1 DOSE   • Syringe/Needle, Disp, (SYRINGE 3CC/22RO3-8/4\") 27G X 1-1/4\" 3 ML MISC Use for Toradol intramuscular injections  • loratadine (CLARITIN) 10 mg tablet TAKE 1 TABLET BY MOUTH EVERY DAY (Patient not taking: Reported on 5/15/2023)   • omeprazole (PriLOSEC) 40 MG capsule Take 1 capsule (40 mg total) by mouth daily (Patient not taking: Reported on 5/15/2023)   • predniSONE 10 mg tablet Take 1 tablet tid x3d then 1 tab bid x3d then 1 tab qd x3d (Patient not taking: Reported on 5/15/2023)   • SODIUM FLUORIDE, DENTAL RINSE, 0 2 % SOLN  (Patient not taking: Reported on 5/15/2023)   • TiZANidine (ZANAFLEX) 2 MG capsule TAKE 1 CAPSULE BY MOUTH THREE TIMES A DAY AS NEEDED FOR MUSCLE SPASM (Patient not taking: Reported on 5/15/2023)     Current Facility-Administered Medications on File Prior to Visit   Medication   • Botulinum Toxin Type A SOLR 200 Units     She has No Known Allergies       Review of Systems   Constitutional: Negative for activity change, appetite change, chills, fatigue and fever  HENT: Negative for rhinorrhea, sneezing and sore throat  Eyes: Negative for visual disturbance     Respiratory: Negative for " "cough, shortness of breath and wheezing  Cardiovascular: Negative for chest pain, palpitations and leg swelling  Gastrointestinal: Negative for abdominal distention, constipation, diarrhea, nausea and vomiting  Genitourinary: Positive for pelvic pain  Negative for difficulty urinating, missed menses and vaginal discharge  Neurological: Negative for syncope and light-headedness  Objective:      /78 (BP Location: Left arm, Patient Position: Sitting, Cuff Size: Standard)   Ht 5' 3 25\" (1 607 m)   Wt 70 8 kg (156 lb)   LMP 05/11/2023 (Exact Date)   BMI 27 42 kg/m²          Physical Exam  Chest:   Breasts:     Breasts are symmetrical       Right: No inverted nipple, mass, nipple discharge, skin change or tenderness  Left: No inverted nipple, mass, nipple discharge, skin change or tenderness  Genitourinary:     Labia:         Right: No rash, tenderness, lesion or injury  Left: No rash, tenderness, lesion or injury  Vagina: Normal  No vaginal discharge, tenderness or bleeding  Cervix: No cervical motion tenderness, discharge or friability  Uterus: Tender  Not deviated, not enlarged and not fixed  Adnexa:         Right: Tenderness and fullness present  No mass  Left: Tenderness present  No mass or fullness  Comments: Prolapse of cervix  Neurological:      Mental Status: She is alert and oriented to person, place, and time           "

## 2023-05-15 NOTE — ASSESSMENT & PLAN NOTE
Pap/HPV collected  Mammogram scheduled for today    Pelvic pain, menorrhagia -just had CT appears to be cyst will await final report  Options: Dx laparoscopy, removal of cyst, possible hysterectomy, change from progestin pill to gnrh agonist,  IUD ( decline)    Encourage healthy diet, exercise, Calcium 1200mg per day and at least 800 iu Vitamin D daily

## 2023-05-19 ENCOUNTER — PROCEDURE VISIT (OUTPATIENT)
Dept: NEUROLOGY | Facility: CLINIC | Age: 43
End: 2023-05-19

## 2023-05-19 ENCOUNTER — TELEPHONE (OUTPATIENT)
Dept: FAMILY MEDICINE CLINIC | Facility: CLINIC | Age: 43
End: 2023-05-19

## 2023-05-19 VITALS
HEIGHT: 63 IN | HEART RATE: 88 BPM | SYSTOLIC BLOOD PRESSURE: 113 MMHG | TEMPERATURE: 97.1 F | BODY MASS INDEX: 27.82 KG/M2 | DIASTOLIC BLOOD PRESSURE: 76 MMHG | WEIGHT: 157 LBS | OXYGEN SATURATION: 100 %

## 2023-05-19 DIAGNOSIS — N83.201 RIGHT OVARIAN CYST: Primary | ICD-10-CM

## 2023-05-19 DIAGNOSIS — N92.0 MENORRHAGIA WITH REGULAR CYCLE: ICD-10-CM

## 2023-05-19 DIAGNOSIS — G43.709 CHRONIC MIGRAINE WITHOUT AURA WITHOUT STATUS MIGRAINOSUS, NOT INTRACTABLE: Primary | ICD-10-CM

## 2023-05-19 LAB
LAB AP GYN PRIMARY INTERPRETATION: NORMAL
Lab: NORMAL

## 2023-05-19 NOTE — PROGRESS NOTES
Universal Protocol   Consent: Verbal consent obtained  Written consent obtained    Risks and benefits: risks, benefits and alternatives were discussed  Consent given by: patient  Patient understanding: patient states understanding of the procedure being performed  Patient consent: the patient's understanding of the procedure matches consent given  Procedure consent: procedure consent matches procedure scheduled        Chemodenervation     Date/Time 5/19/2023 1:00 PM     Performed by  Johnny Licea PA-C     Authorized by Johnny Licea PA-C        Pre-procedure details      Prepped With: Alcohol     Procedure details     Position:  Upright   Botox     Botox Type:  Type A    Brand:  Botox    mL's of Botulinum Toxin:  190    Final Concentration per CC:  100 units    Needle Gauge:  30 G 2 5 inch   Procedures     Botox Procedures: chronic headache      Indications: migraines     Injection Location      Head / Face:  L superior trapezius, R superior trapezius, L superior cervical paraspinal, R superior cervical paraspinal, L , R , procerus, L temporalis, R temporalis, R frontalis, L frontalis, R medial occipitalis and L medial occipitalis    L  injection amount:  5 unit(s)    R  injection amount:  5 unit(s)    L lateral frontalis:  5 unit(s)    R lateral frontalis:  5 unit(s)    L medial frontalis:  5 unit(s)    R medial frontalis:  5 unit(s)    L temporalis injection amount:  20 unit(s)    R temporalis injection amount:  20 unit(s)    Procerus injection amount:  5 unit(s)    L medial occipitalis injection amount:  15 unit(s)    R medial occipitalis injection amount:  15 unit(s)    L superior cervical paraspinal injection amount:  10 unit(s)    R superior cervical paraspinal injection amount:  10 unit(s)    L superior trapezius injection amount:  0 unit(s)    R superior trapezius injection amount:  0 unit(s)   Total Units     Total units used:  190    Total units discarded:  10 "  Post-procedure details      Chemodenervation:  Chronic migraine    Facial Nerve Location[de-identified]  Bilateral facial nerve    Patient tolerance of procedure: Tolerated well, no immediate complications   Comments       Extra units medically necessary:  - 5 under left eye/ bridge of nose  - 5 under right eye/ bridge of nose  - 15 units between both cervical paraspinal muscles  - 40 between both anterior temporalis muscles  Avoided traps  Blood pressure 113/76, pulse 88, temperature (!) 97 1 °F (36 2 °C), temperature source Tympanic, height 5' 3 25\" (1 607 m), weight 71 2 kg (157 lb), last menstrual period 05/11/2023, SpO2 100 %, not currently breastfeeding        "

## 2023-05-19 NOTE — TELEPHONE ENCOUNTER
----- Message from Kristie Carey MD sent at 5/18/2023  7:07 PM EDT -----  Please call patient with  result  stable ovarian cyst, I would defer the treatment to her gynecologist   Also noted to have gallstones, but no inflammation  These can be monitored, not the cause of her pelvic pain

## 2023-05-19 NOTE — PATIENT INSTRUCTIONS
"Headache management instructions  - When patient has a moderate to severe headache, they should seek rest, initiate relaxation and apply cold compresses to the head  - Maintain regular sleep schedule  Adults need at least 7-8 hours of uninterrupted a night  - Limit over the counter medications such as Tylenol, Ibuprofen, Aleve, Excedrin  (No more than 2- 3 times a week or max 10 a month)  - Maintain headache diary  Free JOEY for a smart phone, which can be used is \"Migraine arnulfo\"  - Limit caffeine to 1-2 cups 8 to 16 oz a day or less  - Avoid dietary trigger  (aged cheese, peanuts, MSG, aspartame and nitrates)  - Patient is to have regular frequent meals to prevent headache onset  - Please drink at least 64 ounces of water a day to help remain hydrated  Botox Therapy  Important Information    Our goal is to make sure you fully understand how Botox Therapy treatment may benefit you and to help you understand how you can play an active role in your treatments and ongoing care  Please review the following information below  Call our office IMMEDIATELY @ [phone] and speak to one of our Botox Coordinators if you have a change in insurance  (a prior authorization is required and accurate information is vital)  Please call at least 24 hours in advance if you can't make your appointment  Appointments are scheduled every 91 days (this will be scheduled in advance before leaving the office)  You must allow for at least 2-3 treatments to determine if Botox is right for you  It may take a few weeks to see a response from treatment  No hair dye or scalp massage or treatment within 24 hours of treatment  We encourage you to use a headache diary or journal to document your headache frequency and severity   You can also utilize the Migraine Arnulfo joey (downloadable on [employer])  Sign up for the Botox Savings Program  (commercial insurance patients may qualify) Sign up at ampriceoxsavingsprogram com or " call 1-799.370.1321 Option: 4  To get more information on Botox therapy for Chronic Migraines and see frequently asked questions, please visit [employer]  If you have any questions or concerns, please speak to one of our Botox Coordinators at [phone]  We look forward to servicing you!

## 2023-05-21 DIAGNOSIS — G43.709 CHRONIC MIGRAINE WITHOUT AURA: ICD-10-CM

## 2023-05-22 RX ORDER — RIMEGEPANT SULFATE 75 MG/75MG
TABLET, ORALLY DISINTEGRATING ORAL
Qty: 8 TABLET | Refills: 5 | Status: SHIPPED | OUTPATIENT
Start: 2023-05-22

## 2023-05-24 ENCOUNTER — TELEPHONE (OUTPATIENT)
Dept: OBGYN CLINIC | Facility: MEDICAL CENTER | Age: 43
End: 2023-05-24

## 2023-05-24 NOTE — TELEPHONE ENCOUNTER
Called and left detailed message with results (OK per comm consent)  Office number provided if she were to have questions

## 2023-05-24 NOTE — TELEPHONE ENCOUNTER
----- Message from Evelyn Holden MD sent at 5/24/2023  7:46 AM EDT -----  Patient has not viewed [employer] message    Please call with my message

## 2023-05-26 DIAGNOSIS — N83.201 RIGHT OVARIAN CYST: Primary | ICD-10-CM

## 2023-06-20 ENCOUNTER — TELEPHONE (OUTPATIENT)
Dept: NEUROLOGY | Facility: CLINIC | Age: 43
End: 2023-06-20

## 2023-06-20 NOTE — TELEPHONE ENCOUNTER
Received fax from [address] [place] [address] making us aware that Botox Auth is set to  2023  Submitted PA CMM Key: [id number] - PA Case ID: [id number]      Typical turnaround time with [facility] is 24-72 hours  Will follow up

## 2023-06-20 NOTE — TELEPHONE ENCOUNTER
"Received approval via [facility]    \"Approvedtoday  Request Reference Number: [id number]  BOTOX INJ 200UNIT is approved through 09/20/2023  Your patient may now fill this prescription and it will be covered  \"      Approved   Botox 200 UNITS  Qty   1   Auth# [id number]   Valid: 6/20/23-9/20/23  Visits: 1     Please use  [facility]  "

## 2023-06-28 ENCOUNTER — TELEPHONE (OUTPATIENT)
Dept: NEUROLOGY | Facility: CLINIC | Age: 43
End: 2023-06-28

## 2023-06-28 NOTE — TELEPHONE ENCOUNTER
Called pt to get her scheduled for her next botox appointment, no answer  Left voicemail for her to call us back

## 2023-07-13 ENCOUNTER — OFFICE VISIT (OUTPATIENT)
Dept: FAMILY MEDICINE CLINIC | Facility: CLINIC | Age: 43
End: 2023-07-13
Payer: COMMERCIAL

## 2023-07-13 VITALS
BODY MASS INDEX: 27.93 KG/M2 | HEIGHT: 63 IN | HEART RATE: 87 BPM | SYSTOLIC BLOOD PRESSURE: 118 MMHG | DIASTOLIC BLOOD PRESSURE: 78 MMHG | WEIGHT: 157.6 LBS | OXYGEN SATURATION: 99 %

## 2023-07-13 DIAGNOSIS — Z00.00 ANNUAL PHYSICAL EXAM: ICD-10-CM

## 2023-07-13 DIAGNOSIS — Z00.00 PREVENTATIVE HEALTH CARE: Primary | ICD-10-CM

## 2023-07-13 PROCEDURE — 99396 PREV VISIT EST AGE 40-64: CPT | Performed by: FAMILY MEDICINE

## 2023-07-13 NOTE — PROGRESS NOTES
[facility]    NAME: Patricia Serrano  AGE: 38 y.o. SEX: female  : 1980     DATE: 2023     Assessment and Plan:     Problem List Items Addressed This Visit        Other    Preventative health care - Primary     UTD. Declines adacel. Patient will fup next week for PPD. Relevant Orders    Comprehensive metabolic panel    Lipid panel    Hemoglobin A1C       Immunizations and preventive care screenings were discussed with patient today. Appropriate education was printed on patient's after visit summary. Counseling:  Dental Health: discussed importance of regular tooth brushing, flossing, and dental visits. · Exercise: the importance of regular exercise/physical activity was discussed. Recommend exercise 3-5 times per week for at least 30 minutes. BMI Counseling: Body mass index is 27.7 kg/m². The BMI is above normal. Nutrition recommendations include encouraging healthy choices of fruits and vegetables. Exercise recommendations include moderate physical activity 150 minutes/week. Rationale for BMI follow-up plan is due to patient being overweight or obese. No follow-ups on file. Chief Complaint:     Chief Complaint   Patient presents with   • Physical Exam      History of Present Illness:     Adult Annual Physical   Patient here for a comprehensive physical exam. The patient reports no problems. Diet and Physical Activity  · Diet/Nutrition: well balanced diet. · Exercise: walking. Depression Screening  PHQ-2/9 Depression Screening         General Health  · Sleep: sleeps well. · Hearing: normal - bilateral.  · Vision: no vision problems. · Dental: regular dental visits. /GYN Health  · Follows with GYN. Review of Systems:     Review of Systems   Constitutional: Negative. Respiratory: Negative. Cardiovascular: Negative.        Past Medical History:     Past Medical History:   Diagnosis Date   • Abnormal Pap smear of cervix     In her early 19's   • Back pain    • Irregular heart beat     Palpitations   • Menorrhagia    • Migraines       Past Surgical History:     Past Surgical History:   Procedure Laterality Date   • OOPHORECTOMY Left 12/7/2020    Procedure: SALPINGO-OOPHORECTOMY, LAPAROSCOPIC;  Surgeon: Carolyn Colon DO;  [facility]: [facility];  Service: Gynecology   • OVARIAN CYST SURGERY     • [place] HYSTEROSCOPY BX ENDOMETRIUM&/POLYPC W/WO D&C N/A 11/9/2017    Procedure: DILATATION AND CURETTAGE (D&C) WITH HYSTEROSCOPY;  Surgeon: David Menon DO;  [facility];  Service: Gynecology   • [place] HYSTEROSCOPY ENDOMETRIAL ABLATION N/A 11/9/2017    Procedure: Tiffanie Loza;  Surgeon: David Menon DO;  [facility];  Service: Gynecology   • [place] LAPS ABD PRTM&OMENTUM DX W/WO SPEC BR/WA SPX N/A 12/7/2020    Procedure: LAPAROSCOPY DIAGNOSTIC;  Surgeon: Carolyn Colon DO;  [facility]: [facility];  Service: Gynecology   • WISDOM TOOTH EXTRACTION        Social History:     Social History     Socioeconomic History   • Marital status: /Civil Union     Spouse name: None   • Number of children: None   • Years of education: None   • Highest education level: None   Occupational History   • None   Tobacco Use   • Smoking status: Never   • Smokeless tobacco: Never   Vaping Use   • Vaping Use: Never used   Substance and Sexual Activity   • Alcohol use: No   • Drug use: Never   • Sexual activity: Yes     Partners: Male     Birth control/protection: OCP   Other Topics Concern   • None   Social History Narrative   • None     Social Determinants of Health     Financial Resource Strain: Not on file   Food Insecurity: Not on file   Transportation Needs: Not on file   Physical Activity: Not on file   Stress: Not on file   Social Connections: Not on file   Intimate Partner Violence: Not on file   Housing Stability: Not on file      Family History: Family History   Problem Relation Age of Onset   • No Known Problems Mother    • No Known Problems Father    • No Known Problems Brother    • No Known Problems Daughter    • No Known Problems Son    • Dementia Maternal Grandmother    • Heart disease Maternal Grandmother    • Diabetes Maternal Grandmother    • No Known Problems Maternal Grandfather    • No Known Problems Paternal Grandmother    • Heart disease Paternal Grandfather    • Breast cancer Neg Hx    • Colon cancer Neg Hx    • Ovarian cancer Neg Hx    • Uterine cancer Neg Hx    • Cervical cancer Neg Hx       Current Medications:     Current Outpatient Medications   Medication Sig Dispense Refill   • Botox 100 units      • chlorhexidine (PERIDEX) 0.12 % solution      • Coenzyme Q10 (COQ10) 100 MG CAPS 1 cap qd. 30 capsule 2   • estradiol (ESTRACE) 0.1 mg/g vaginal cream Insert 0.5 g into the vagina 2 (two) times a week 42.5 g 3   • gabapentin (NEURONTIN) 100 mg capsule TAKE 1 CAPSULE BY MOUTH AT BEDTIME 90 capsule 2   • Incassia 0.35 MG tablet TAKE 1 TABLET (0.35 MG TOTAL) BY MOUTH 2 (TWO) TIMES A  tablet 3   • Nurtec 75 MG TBDP TAKE 1 TAB AT MIGRAINE ONSET. NO MORE THAN ONE DOSE PER 24 HOURS. HOLD TRIPTAN. 8 tablet 5   • rimegepant sulfate (NURTEC) 75 mg TBDP Take 75 mg by mouth once as needed for migraine Only 1 dose per 24 hours. • SUMAtriptan (IMITREX) 100 mg tablet TAKE 1 TABLET AT MIGRAINE ONSET. MAY REPEAT IN 2 HOURS IF NEEDED (MAX 2TABS/DAY, 3TABS/WEEK) 9 tablet 5   • SUMAtriptan (IMITREX) 6 mg/0.5 mL INJECT CONTENTS OF 1 VIAL UNDER SKIN FOR 1 DOSE 2.5 mL 0   • Syringe/Needle, Disp, (SYRINGE 3CC/10OW4-8/4") 27G X 1-1/4" 3 ML MISC Use for Toradol intramuscular injections.  3 each 2   • loratadine (CLARITIN) 10 mg tablet TAKE 1 TABLET BY MOUTH EVERY DAY (Patient not taking: Reported on 5/15/2023) 90 tablet 1   • omeprazole (PriLOSEC) 40 MG capsule Take 1 capsule (40 mg total) by mouth daily (Patient not taking: Reported on 5/15/2023) 30 capsule 0   • predniSONE 10 mg tablet Take 1 tablet tid x3d then 1 tab bid x3d then 1 tab qd x3d (Patient not taking: Reported on 5/15/2023) 18 tablet 0   • SODIUM FLUORIDE, DENTAL RINSE, 0.2 % SOLN  (Patient not taking: Reported on 5/15/2023)     • TiZANidine (ZANAFLEX) 2 MG capsule TAKE 1 CAPSULE BY MOUTH THREE TIMES A DAY AS NEEDED FOR MUSCLE SPASM (Patient not taking: Reported on 5/15/2023) 270 capsule 1     Current Facility-Administered Medications   Medication Dose Route Frequency Provider Last Rate Last Admin   • Botulinum Toxin Type A SOLR 200 Units  200 Units Injection Q3 Months Nusrat Iyer PA-C   200 Units at 02/17/23 1523      Allergies:     No Known Allergies   Physical Exam:     /78   Pulse 87   Ht 5' 3.25" (1.607 m)   Wt 71.5 kg (157 lb 9.6 oz)   SpO2 99%   BMI 27.70 kg/m²     Physical Exam  Constitutional:       Appearance: Normal appearance. HENT:      Right Ear: Tympanic membrane normal.      Left Ear: Tympanic membrane normal.      Mouth/Throat:      Pharynx: No posterior oropharyngeal erythema. Eyes:      Pupils: Pupils are equal, round, and reactive to light. Cardiovascular:      Heart sounds: Normal heart sounds. Pulmonary:      Breath sounds: Normal breath sounds. Abdominal:      Palpations: Abdomen is soft. Musculoskeletal:      Right lower leg: No edema. Left lower leg: No edema. Neurological:      Mental Status: She is oriented to person, place, and time.    Psychiatric:         Mood and Affect: Mood normal.          Clearance MD Shira  [address] [facility]

## 2023-07-17 ENCOUNTER — CLINICAL SUPPORT (OUTPATIENT)
Dept: FAMILY MEDICINE CLINIC | Facility: CLINIC | Age: 43
End: 2023-07-17
Payer: COMMERCIAL

## 2023-07-17 DIAGNOSIS — Z11.1 SCREENING FOR TUBERCULOSIS: Primary | ICD-10-CM

## 2023-07-17 PROCEDURE — 86580 TB INTRADERMAL TEST: CPT

## 2023-08-14 ENCOUNTER — HOSPITAL ENCOUNTER (OUTPATIENT)
Dept: ULTRASOUND IMAGING | Facility: HOSPITAL | Age: 43
Discharge: HOME | End: 2023-08-14
Attending: OBSTETRICS & GYNECOLOGY
Payer: COMMERCIAL

## 2023-08-14 DIAGNOSIS — N83.201 RIGHT OVARIAN CYST: ICD-10-CM

## 2023-08-14 PROCEDURE — 76856 US EXAM PELVIC COMPLETE: CPT

## 2023-08-14 PROCEDURE — 76830 TRANSVAGINAL US NON-OB: CPT

## 2023-08-23 ENCOUNTER — TELEPHONE (OUTPATIENT)
Dept: NEUROLOGY | Facility: CLINIC | Age: 43
End: 2023-08-23

## 2023-08-28 ENCOUNTER — CONSULT (OUTPATIENT)
Dept: GASTROENTEROLOGY | Facility: AMBULARY SURGERY CENTER | Age: 43
End: 2023-08-28
Payer: COMMERCIAL

## 2023-08-28 ENCOUNTER — TELEPHONE (OUTPATIENT)
Dept: NEUROLOGY | Facility: CLINIC | Age: 43
End: 2023-08-28

## 2023-08-28 VITALS
BODY MASS INDEX: 28.7 KG/M2 | OXYGEN SATURATION: 100 % | HEIGHT: 63 IN | SYSTOLIC BLOOD PRESSURE: 124 MMHG | DIASTOLIC BLOOD PRESSURE: 88 MMHG | WEIGHT: 162 LBS | HEART RATE: 98 BPM

## 2023-08-28 DIAGNOSIS — G89.29 CHRONIC LLQ PAIN: ICD-10-CM

## 2023-08-28 DIAGNOSIS — R10.32 CHRONIC LLQ PAIN: ICD-10-CM

## 2023-08-28 DIAGNOSIS — R10.2 PELVIC PAIN: ICD-10-CM

## 2023-08-28 DIAGNOSIS — K59.00 CONSTIPATION, UNSPECIFIED CONSTIPATION TYPE: ICD-10-CM

## 2023-08-28 PROCEDURE — 99244 OFF/OP CNSLTJ NEW/EST MOD 40: CPT | Performed by: INTERNAL MEDICINE

## 2023-08-28 NOTE — TELEPHONE ENCOUNTER
Patient is scheduled for BINJ on 9/8 in [facility]. [facility] [phone] and spoke to [place]. Botox 200 UNITS  Qty. 1  Scheduled: 8/30  Location: [facility]  Via: Ups/[employer]       Please let us know if Botox does not arrive. Thanks!

## 2023-08-28 NOTE — PATIENT INSTRUCTIONS
Take 1 tablespoon of metamucil, continue 1 capful miralax daily    Scheduled date of colonoscopy (as of today):  10/18/23  Physician performing colonoscopy:  Dr Walter Coon  Location of colonoscopy:  [facility]   Bowel prep reviewed with patient: Miralax/Dulcolax  Instructions reviewed with patient by:  Danielle PANIAGUA  Clearances:  n/a

## 2023-08-28 NOTE — PROGRESS NOTES
Consultation -  [facility]  Ike Lawrence 38 y.o. female MRN: [id number]          Assessment & Plan:  Pleasant 51-year-old female with longstanding constipation, left lower quadrant pain, worse recently. 1.  Chronic constipation: Recent CAT scan shows a significant mount of fecal loading throughout her colon  -Suspect that most of her symptoms are constipation predominant IBS  -Recommended that she take Metamucil but also add MiraLAX on a regular basis  -If not improved in the next few weeks, we can prescribe an antispasmodic such as Bentyl or Levsin  -We will check TSH    2. Left lower quadrant pain: Most likely secondary to constipation as above  -Given longstanding history of symptoms, patient's age, recommend proceeding with a colonoscopy to exclude luminal pathology  -Discussed with the risks of procedure including bleeding, surgery, perforation  -We will check LFTs    Rolando Howard was seen today for advice only. Diagnoses and all orders for this visit:    Pelvic pain  -     Ambulatory Referral to Gastroenterology    Constipation, unspecified constipation type  -     Ambulatory Referral to Gastroenterology  -     TSH, 3rd generation with Free T4 reflex; Future  -     Hepatic function panel; Future  -     Colonoscopy; Future    Chronic LLQ pain  -     Ambulatory Referral to Gastroenterology  -     TSH, 3rd generation with Free T4 reflex; Future  -     Hepatic function panel; Future  -     Colonoscopy; Future    Other orders  -     Diet NPO; Sips with meds; Standing  -     Void on call to OR; Standing            _____________________________________________________________        CC: Constipation and left lower quadrant pain    HPI:  Ike Lawrence is a 38 y. o.female who was referred for evaluation of constipation and left lower quadrant pain.   This is a pleasant 51-year-old female, has a history of longstanding constipation, has had intermittent bouts of left lower quadrant pain, usually worse around her menstrual cycles. The symptoms have been ongoing but worsened recently, due to persistent pelvic pain in the past was found to have a large ovarian cyst status post left oophorectomy. Continues to have symptoms worse after her surgery. Again usually worse around her menstrual cycles. Patient denies any nausea, vomiting, heartburn, dysphagia, denies any melena or rectal bleeding. She notes that when she does take Metamucil and MiraLAX and she has a normal bowel movement her symptoms seem to subside. She has been taking Metamucil regularly upon the recommendation of PCP and taking MiraLAX as needed when constipation gets worse. Her weight has been stable. Patient is otherwise healthy. Surgical history is notable for oophorectomy. Denies any alcohol or tobacco, she works at a . Family history is notable for maternal grandmother who had some colon resection. ROS:  The remainder of the ROS was negative except for the pertinent positives mentioned in HPI. Allergies: Patient has no known allergies. Medications:   Current Outpatient Medications:   •  Botox 100 units, , Disp: , Rfl:   •  Coenzyme Q10 (COQ10) 100 MG CAPS, 1 cap qd. , Disp: 30 capsule, Rfl: 2  •  estradiol (ESTRACE) 0.1 mg/g vaginal cream, Insert 0.5 g into the vagina 2 (two) times a week, Disp: 42.5 g, Rfl: 3  •  gabapentin (NEURONTIN) 100 mg capsule, TAKE 1 CAPSULE BY MOUTH AT BEDTIME, Disp: 90 capsule, Rfl: 2  •  Incassia 0.35 MG tablet, TAKE 1 TABLET (0.35 MG TOTAL) BY MOUTH 2 (TWO) TIMES A DAY, Disp: 168 tablet, Rfl: 3  •  Nurtec 75 MG TBDP, TAKE 1 TAB AT MIGRAINE ONSET. NO MORE THAN ONE DOSE PER 24 HOURS. HOLD TRIPTAN., Disp: 8 tablet, Rfl: 5  •  SUMAtriptan (IMITREX) 100 mg tablet, TAKE 1 TABLET AT MIGRAINE ONSET.  MAY REPEAT IN 2 HOURS IF NEEDED (MAX 2TABS/DAY, 3TABS/WEEK), Disp: 9 tablet, Rfl: 5  •  SUMAtriptan (IMITREX) 6 mg/0.5 mL, INJECT CONTENTS OF 1 VIAL UNDER SKIN FOR 1 DOSE, Disp: 2.5 mL, Rfl: 0  •  Syringe/Needle, Disp, (SYRINGE 3CC/81PV1-8/4") 27G X 1-1/4" 3 ML MISC, Use for Toradol intramuscular injections. , Disp: 3 each, Rfl: 2  •  chlorhexidine (PERIDEX) 0.12 % solution, , Disp: , Rfl:   •  loratadine (CLARITIN) 10 mg tablet, TAKE 1 TABLET BY MOUTH EVERY DAY (Patient not taking: Reported on 5/15/2023), Disp: 90 tablet, Rfl: 1  •  omeprazole (PriLOSEC) 40 MG capsule, Take 1 capsule (40 mg total) by mouth daily (Patient not taking: Reported on 5/15/2023), Disp: 30 capsule, Rfl: 0  •  predniSONE 10 mg tablet, Take 1 tablet tid x3d then 1 tab bid x3d then 1 tab qd x3d (Patient not taking: Reported on 5/15/2023), Disp: 18 tablet, Rfl: 0  •  rimegepant sulfate (NURTEC) 75 mg TBDP, Take 75 mg by mouth once as needed for migraine Only 1 dose per 24 hours.  (Patient not taking: Reported on 8/28/2023), Disp: , Rfl:   •  SODIUM FLUORIDE, DENTAL RINSE, 0.2 % SOLN, , Disp: , Rfl:   •  TiZANidine (ZANAFLEX) 2 MG capsule, TAKE 1 CAPSULE BY MOUTH THREE TIMES A DAY AS NEEDED FOR MUSCLE SPASM (Patient not taking: Reported on 5/15/2023), Disp: 270 capsule, Rfl: 1    Current Facility-Administered Medications:   •  Botulinum Toxin Type A SOLR 200 Units, 200 Units, Injection, Q3 Months, Fredy Reyes PA-C, 200 Units at 02/17/23 1523'    Past Medical History:   Diagnosis Date   • Abnormal Pap smear of cervix     In her early 19's   • Back pain    • Irregular heart beat     Palpitations   • Menorrhagia    • Migraines        Past Surgical History:   Procedure Laterality Date   • OOPHORECTOMY Left 12/7/2020    Procedure: SALPINGO-OOPHORECTOMY, LAPAROSCOPIC;  Surgeon: Darryle Narrow, DO;  [facility];  Service: Gynecology   • OVARIAN CYST SURGERY     • [place] HYSTEROSCOPY BX ENDOMETRIUM&/POLYPC W/WO D&C N/A 11/9/2017    Procedure: DILATATION AND CURETTAGE (D&C) WITH HYSTEROSCOPY;  Surgeon: Kingsley Kim DO;  [facility];  Service: Gynecology   • MO HYSTEROSCOPY ENDOMETRIAL ABLATION N/A 11/9/2017    Procedure: ABLATION ENDOMETRIAL  Reneginbart Guardado;  Surgeon: Dalila Talbot DO;  [facility];  Service: Gynecology   • [place] LAPS ABD PRTM&OMENTUM DX W/WO SPEC BR/WA SPX N/A 12/7/2020    Procedure: LAPAROSCOPY DIAGNOSTIC;  Surgeon: Krissy Chavez DO;  [facility];  Service: Gynecology   • WISDOM TOOTH EXTRACTION         Family History   Problem Relation Age of Onset   • No Known Problems Mother    • No Known Problems Father    • No Known Problems Brother    • No Known Problems Daughter    • No Known Problems Son    • Dementia Maternal Grandmother    • Heart disease Maternal Grandmother    • Diabetes Maternal Grandmother    • No Known Problems Maternal Grandfather    • No Known Problems Paternal Grandmother    • Heart disease Paternal Grandfather    • Breast cancer Neg Hx    • Colon cancer Neg Hx    • Ovarian cancer Neg Hx    • Uterine cancer Neg Hx    • Cervical cancer Neg Hx         reports that she has never smoked. She has never used smokeless tobacco. She reports that she does not drink alcohol and does not use drugs.           Physical Exam:     /88 (BP Location: Left arm, Patient Position: Sitting, Cuff Size: Adult)   Pulse 98   Ht 5' 3.25" (1.607 m)   Wt 73.5 kg (162 lb)   SpO2 100%   BMI 28.47 kg/m²     Gen: wn/wd, NAD, healthy-appearing female  HEENT: anicteric, MMM, no cervical LAD  CVS: RRR, no m/r/g  CHEST: CTA b/l  ABD: +BS, soft, NT,ND, no hepatosplenomegaly  EXT: no c/c/e  NEURO: aaox3  SKIN: NO rashes

## 2023-08-29 ENCOUNTER — HOSPITAL ENCOUNTER (OUTPATIENT)
Dept: NON INVASIVE DIAGNOSTICS | Facility: CLINIC | Age: 43
Discharge: HOME | End: 2023-08-29
Payer: COMMERCIAL

## 2023-08-29 VITALS
BODY MASS INDEX: 28.7 KG/M2 | SYSTOLIC BLOOD PRESSURE: 124 MMHG | DIASTOLIC BLOOD PRESSURE: 88 MMHG | HEIGHT: 63 IN | WEIGHT: 162 LBS | HEART RATE: 80 BPM

## 2023-08-29 DIAGNOSIS — R01.1 CARDIAC MURMUR: ICD-10-CM

## 2023-08-29 DIAGNOSIS — Z82.79 FAMILY HISTORY OF FIRST DEGREE RELATIVE WITH BICUSPID AORTIC VALVE: ICD-10-CM

## 2023-08-29 PROCEDURE — 93306 TTE W/DOPPLER COMPLETE: CPT

## 2023-08-30 LAB
AORTIC ROOT: 2.9 CM
APICAL FOUR CHAMBER EJECTION FRACTION: 59 %
ASCENDING AORTA: 2.8 CM
AV LVOT MEAN GRADIENT: 2 MMHG
AV LVOT PEAK GRADIENT: 3 MMHG
DOP CALC LVOT PEAK VEL VTI: 19.82 CM
DOP CALC LVOT PEAK VEL: 0.92 M/S
E WAVE DECELERATION TIME: 153 MS
FRACTIONAL SHORTENING: 30 (ref 28–44)
INTERVENTRICULAR SEPTUM IN DIASTOLE (PARASTERNAL SHORT AXIS VIEW): 0.8 CM
INTERVENTRICULAR SEPTUM: 0.8 CM (ref 0.6–1.1)
LAAS-AP2: 14.6 CM2
LAAS-AP4: 14.2 CM2
LEFT ATRIUM SIZE: 3 CM
LEFT ATRIUM VOLUME (MOD BIPLANE): 41 ML
LEFT INTERNAL DIMENSION IN SYSTOLE: 2.6 CM (ref 2.1–4)
LEFT VENTRICULAR INTERNAL DIMENSION IN DIASTOLE: 3.7 CM (ref 3.5–6)
LEFT VENTRICULAR POSTERIOR WALL IN END DIASTOLE: 0.9 CM
LEFT VENTRICULAR STROKE VOLUME: 34 ML
LVSV (TEICH): 34 ML
MV E'TISSUE VEL-SEP: 14 CM/S
MV PEAK A VEL: 0.76 M/S
MV PEAK E VEL: 85 CM/S
MV STENOSIS PRESSURE HALF TIME: 45 MS
MV VALVE AREA P 1/2 METHOD: 4.89
RIGHT ATRIUM AREA SYSTOLE A4C: 10.6 CM2
RIGHT VENTRICLE ID DIMENSION: 3.1 CM
SL CV LEFT ATRIUM LENGTH A2C: 3.9 CM
SL CV LV EF: 55
SL CV PED ECHO LEFT VENTRICLE DIASTOLIC VOLUME (MOD BIPLANE) 2D: 59 ML
SL CV PED ECHO LEFT VENTRICLE SYSTOLIC VOLUME (MOD BIPLANE) 2D: 25 ML
TRICUSPID ANNULAR PLANE SYSTOLIC EXCURSION: 2 CM

## 2023-08-30 PROCEDURE — 93306 TTE W/DOPPLER COMPLETE: CPT | Performed by: INTERNAL MEDICINE

## 2023-08-30 NOTE — TELEPHONE ENCOUNTER
Botox number of units: 100  Botox quantity: 2  Arrived at what location: CV  Botox at Correct Administering Location: yes  NDC number: [id number]  Lot number: [id number]  Expiration Date: 2025/11  Appt notes indicate correct medication: yes

## 2023-09-01 ENCOUNTER — TELEPHONE (OUTPATIENT)
Dept: FAMILY MEDICINE CLINIC | Facility: CLINIC | Age: 43
End: 2023-09-01

## 2023-09-01 NOTE — TELEPHONE ENCOUNTER
----- Message from Axel Perdue MD sent at 8/31/2023  5:48 PM EDT -----  Please call patient with normal results.

## 2023-09-08 ENCOUNTER — PROCEDURE VISIT (OUTPATIENT)
Dept: NEUROLOGY | Facility: CLINIC | Age: 43
End: 2023-09-08
Payer: COMMERCIAL

## 2023-09-08 VITALS
WEIGHT: 162 LBS | DIASTOLIC BLOOD PRESSURE: 72 MMHG | HEART RATE: 94 BPM | TEMPERATURE: 98.6 F | SYSTOLIC BLOOD PRESSURE: 118 MMHG | HEIGHT: 63 IN | BODY MASS INDEX: 28.7 KG/M2

## 2023-09-08 DIAGNOSIS — G43.709 CHRONIC MIGRAINE WITHOUT AURA WITHOUT STATUS MIGRAINOSUS, NOT INTRACTABLE: Primary | ICD-10-CM

## 2023-09-08 PROCEDURE — 64615 CHEMODENERV MUSC MIGRAINE: CPT | Performed by: PHYSICIAN ASSISTANT

## 2023-09-08 NOTE — PROGRESS NOTES
Universal Protocol   Consent: Verbal consent obtained. Written consent obtained.   Risks and benefits: risks, benefits and alternatives were discussed  Consent given by: patient  Patient understanding: patient states understanding of the procedure being performed  Patient consent: the patient's understanding of the procedure matches consent given  Procedure consent: procedure consent matches procedure scheduled        Chemodenervation     Date/Time 9/8/2023 3:00 PM     Performed by  Mara Win PA-C   Authorized by Mara Win PA-C       Pre-procedure details      Prepped With: Alcohol     Procedure details     Position:  Upright   Botox     Botox Type:  Type A    Brand:  Botox    mL's of Botulinum Toxin:  190    Final Concentration per CC:  100 units    Needle Gauge:  30 G 2.5 inch   Procedures     Botox Procedures: chronic headache      Indications: migraines     Injection Location      Head / Face:  L superior trapezius, R superior trapezius, L superior cervical paraspinal, R superior cervical paraspinal, L , R , procerus, L temporalis, R temporalis, R frontalis, L frontalis, R medial occipitalis and L medial occipitalis    L  injection amount:  5 unit(s)    R  injection amount:  5 unit(s)    L lateral frontalis:  5 unit(s)    R lateral frontalis:  5 unit(s)    L medial frontalis:  5 unit(s)    R medial frontalis:  5 unit(s)    L temporalis injection amount:  20 unit(s)    R temporalis injection amount:  20 unit(s)    Procerus injection amount:  5 unit(s)    L medial occipitalis injection amount:  15 unit(s)    R medial occipitalis injection amount:  15 unit(s)    L superior cervical paraspinal injection amount:  10 unit(s)    R superior cervical paraspinal injection amount:  10 unit(s)    L superior trapezius injection amount:  0 unit(s)    R superior trapezius injection amount:  0 unit(s)   Total Units     Total units used:  190    Total units discarded:  10 Post-procedure details      Chemodenervation:  Chronic migraine    Facial Nerve Location[de-identified]  Bilateral facial nerve    Patient tolerance of procedure: Tolerated well, no immediate complications   Comments      Extra units medically necessary:  - 10 between each orbicularis oculi  - 15 units between both cervical paraspinal muscles  - 40 between both anterior temporalis muscles  Avoided traps. Blood pressure 118/72, pulse 94, temperature 98.6 °F (37 °C), temperature source Temporal, height 5' 3" (1.6 m), weight 73.5 kg (162 lb), not currently breastfeeding. Body mass index is 28.7 kg/m².

## 2023-10-07 ENCOUNTER — APPOINTMENT (OUTPATIENT)
Dept: LAB | Facility: CLINIC | Age: 43
End: 2023-10-07
Payer: COMMERCIAL

## 2023-10-07 DIAGNOSIS — N92.0 MENORRHAGIA WITH REGULAR CYCLE: ICD-10-CM

## 2023-10-07 DIAGNOSIS — K59.00 CONSTIPATION, UNSPECIFIED CONSTIPATION TYPE: ICD-10-CM

## 2023-10-07 DIAGNOSIS — R10.32 CHRONIC LLQ PAIN: ICD-10-CM

## 2023-10-07 DIAGNOSIS — G89.29 CHRONIC LLQ PAIN: ICD-10-CM

## 2023-10-07 DIAGNOSIS — Z00.00 PREVENTATIVE HEALTH CARE: ICD-10-CM

## 2023-10-07 DIAGNOSIS — R10.2 PELVIC PAIN: ICD-10-CM

## 2023-10-07 LAB
ALBUMIN SERPL BCP-MCNC: 4.3 G/DL (ref 3.5–5)
ALP SERPL-CCNC: 43 U/L (ref 34–104)
ALT SERPL W P-5'-P-CCNC: 11 U/L (ref 7–52)
ANION GAP SERPL CALCULATED.3IONS-SCNC: 5 MMOL/L
AST SERPL W P-5'-P-CCNC: 12 U/L (ref 13–39)
BASOPHILS # BLD AUTO: 0.04 THOUSANDS/ÂΜL (ref 0–0.1)
BASOPHILS NFR BLD AUTO: 1 % (ref 0–1)
BILIRUB DIRECT SERPL-MCNC: 0.06 MG/DL (ref 0–0.2)
BILIRUB SERPL-MCNC: 0.43 MG/DL (ref 0.2–1)
BUN SERPL-MCNC: 8 MG/DL (ref 5–25)
CALCIUM SERPL-MCNC: 9.1 MG/DL (ref 8.4–10.2)
CHLORIDE SERPL-SCNC: 106 MMOL/L (ref 96–108)
CHOLEST SERPL-MCNC: 185 MG/DL
CO2 SERPL-SCNC: 24 MMOL/L (ref 21–32)
CREAT SERPL-MCNC: 0.61 MG/DL (ref 0.6–1.3)
EOSINOPHIL # BLD AUTO: 0.16 THOUSAND/ÂΜL (ref 0–0.61)
EOSINOPHIL NFR BLD AUTO: 2 % (ref 0–6)
ERYTHROCYTE [DISTWIDTH] IN BLOOD BY AUTOMATED COUNT: 12.2 % (ref 11.6–15.1)
EST. AVERAGE GLUCOSE BLD GHB EST-MCNC: 111 MG/DL
GFR SERPL CREATININE-BSD FRML MDRD: 113 ML/MIN/1.73SQ M
GLUCOSE P FAST SERPL-MCNC: 93 MG/DL (ref 65–99)
HBA1C MFR BLD: 5.5 %
HCT VFR BLD AUTO: 37 % (ref 34.8–46.1)
HDLC SERPL-MCNC: 53 MG/DL
HGB BLD-MCNC: 12.7 G/DL (ref 11.5–15.4)
IMM GRANULOCYTES # BLD AUTO: 0.02 THOUSAND/UL (ref 0–0.2)
IMM GRANULOCYTES NFR BLD AUTO: 0 % (ref 0–2)
LDLC SERPL CALC-MCNC: 122 MG/DL (ref 0–100)
LYMPHOCYTES # BLD AUTO: 1.87 THOUSANDS/ÂΜL (ref 0.6–4.47)
LYMPHOCYTES NFR BLD AUTO: 24 % (ref 14–44)
MCH RBC QN AUTO: 32.4 PG (ref 26.8–34.3)
MCHC RBC AUTO-ENTMCNC: 34.3 G/DL (ref 31.4–37.4)
MCV RBC AUTO: 94 FL (ref 82–98)
MONOCYTES # BLD AUTO: 0.71 THOUSAND/ÂΜL (ref 0.17–1.22)
MONOCYTES NFR BLD AUTO: 9 % (ref 4–12)
NEUTROPHILS # BLD AUTO: 4.98 THOUSANDS/ÂΜL (ref 1.85–7.62)
NEUTS SEG NFR BLD AUTO: 64 % (ref 43–75)
NONHDLC SERPL-MCNC: 132 MG/DL
NRBC BLD AUTO-RTO: 0 /100 WBCS
PLATELET # BLD AUTO: 236 THOUSANDS/UL (ref 149–390)
PMV BLD AUTO: 12.5 FL (ref 8.9–12.7)
POTASSIUM SERPL-SCNC: 4.2 MMOL/L (ref 3.5–5.3)
PROT SERPL-MCNC: 7.1 G/DL (ref 6.4–8.4)
RBC # BLD AUTO: 3.92 MILLION/UL (ref 3.81–5.12)
SODIUM SERPL-SCNC: 135 MMOL/L (ref 135–147)
TRIGL SERPL-MCNC: 50 MG/DL
TSH SERPL DL<=0.05 MIU/L-ACNC: 1.62 UIU/ML (ref 0.45–4.5)
WBC # BLD AUTO: 7.78 THOUSAND/UL (ref 4.31–10.16)

## 2023-10-07 PROCEDURE — 36415 COLL VENOUS BLD VENIPUNCTURE: CPT

## 2023-10-07 PROCEDURE — 80061 LIPID PANEL: CPT

## 2023-10-07 PROCEDURE — 83036 HEMOGLOBIN GLYCOSYLATED A1C: CPT

## 2023-10-07 PROCEDURE — 80053 COMPREHEN METABOLIC PANEL: CPT

## 2023-10-07 PROCEDURE — 82248 BILIRUBIN DIRECT: CPT

## 2023-10-07 PROCEDURE — 84443 ASSAY THYROID STIM HORMONE: CPT

## 2023-10-07 PROCEDURE — 85025 COMPLETE CBC W/AUTO DIFF WBC: CPT

## 2023-10-09 ENCOUNTER — TELEPHONE (OUTPATIENT)
Dept: FAMILY MEDICINE CLINIC | Facility: CLINIC | Age: 43
End: 2023-10-09

## 2023-10-09 NOTE — TELEPHONE ENCOUNTER
----- Message from Noelle Torres MD sent at 10/9/2023  9:35 AM EDT -----  Please call patient with normal results.

## 2023-10-16 ENCOUNTER — TELEPHONE (OUTPATIENT)
Dept: NEUROLOGY | Facility: CLINIC | Age: 43
End: 2023-10-16

## 2023-10-16 NOTE — TELEPHONE ENCOUNTER
Left message to see if she would be able to come in at 230 instead of 3pm for her upcoming Botox with [address] on 12/8. [address] needs to leave early that day.

## 2023-10-18 ENCOUNTER — HOSPITAL ENCOUNTER (OUTPATIENT)
Dept: GASTROENTEROLOGY | Facility: AMBULARY SURGERY CENTER | Age: 43
Setting detail: OUTPATIENT SURGERY
Discharge: HOME | End: 2023-10-18
Attending: INTERNAL MEDICINE | Admitting: INTERNAL MEDICINE
Payer: COMMERCIAL

## 2023-10-18 ENCOUNTER — ANESTHESIA EVENT (OUTPATIENT)
Dept: GASTROENTEROLOGY | Facility: AMBULARY SURGERY CENTER | Age: 43
End: 2023-10-18

## 2023-10-18 ENCOUNTER — ANESTHESIA (OUTPATIENT)
Dept: GASTROENTEROLOGY | Facility: AMBULARY SURGERY CENTER | Age: 43
End: 2023-10-18

## 2023-10-18 VITALS
HEART RATE: 80 BPM | WEIGHT: 159 LBS | DIASTOLIC BLOOD PRESSURE: 68 MMHG | OXYGEN SATURATION: 100 % | SYSTOLIC BLOOD PRESSURE: 108 MMHG | BODY MASS INDEX: 28.17 KG/M2 | TEMPERATURE: 97.2 F | RESPIRATION RATE: 19 BRPM | HEIGHT: 63 IN

## 2023-10-18 DIAGNOSIS — R10.32 CHRONIC LLQ PAIN: ICD-10-CM

## 2023-10-18 DIAGNOSIS — G89.29 CHRONIC LLQ PAIN: ICD-10-CM

## 2023-10-18 DIAGNOSIS — K59.00 CONSTIPATION, UNSPECIFIED CONSTIPATION TYPE: ICD-10-CM

## 2023-10-18 PROCEDURE — 45378 DIAGNOSTIC COLONOSCOPY: CPT | Performed by: INTERNAL MEDICINE

## 2023-10-18 RX ORDER — SODIUM CHLORIDE, SODIUM LACTATE, POTASSIUM CHLORIDE, CALCIUM CHLORIDE 600; 310; 30; 20 MG/100ML; MG/100ML; MG/100ML; MG/100ML
INJECTION, SOLUTION INTRAVENOUS CONTINUOUS PRN
Status: DISCONTINUED | OUTPATIENT
Start: 2023-10-18 | End: 2023-10-18

## 2023-10-18 RX ORDER — PROPOFOL 10 MG/ML
INJECTION, EMULSION INTRAVENOUS AS NEEDED
Status: DISCONTINUED | OUTPATIENT
Start: 2023-10-18 | End: 2023-10-18

## 2023-10-18 RX ADMIN — PROPOFOL 30 MG: 10 INJECTION, EMULSION INTRAVENOUS at 10:09

## 2023-10-18 RX ADMIN — SODIUM CHLORIDE, SODIUM LACTATE, POTASSIUM CHLORIDE, AND CALCIUM CHLORIDE: .6; .31; .03; .02 INJECTION, SOLUTION INTRAVENOUS at 09:54

## 2023-10-18 RX ADMIN — PROPOFOL 40 MG: 10 INJECTION, EMULSION INTRAVENOUS at 10:10

## 2023-10-18 RX ADMIN — PROPOFOL 40 MG: 10 INJECTION, EMULSION INTRAVENOUS at 10:15

## 2023-10-18 RX ADMIN — PROPOFOL 40 MG: 10 INJECTION, EMULSION INTRAVENOUS at 10:13

## 2023-10-18 RX ADMIN — PROPOFOL 100 MG: 10 INJECTION, EMULSION INTRAVENOUS at 10:07

## 2023-10-18 NOTE — ANESTHESIA PREPROCEDURE EVALUATION
Procedure:  COLONOSCOPY    Relevant Problems   CARDIO   (+) Chronic migraine without aura   (+) Chronic migraine without aura without status migrainosus, not intractable   (+) Menstrual migraine without status migrainosus, not intractable   (+) Murmur      MUSCULOSKELETAL   (+) Myofascial pain syndrome, cervical      NEURO/PSYCH   (+) Chronic migraine without aura   (+) Chronic migraine without aura without status migrainosus, not intractable   (+) Headache   (+) Menstrual migraine without status migrainosus, not intractable   (+) Myofascial pain syndrome, cervical        Physical Exam    Airway    Mallampati score: II  TM Distance: >3 FB  Neck ROM: full     Dental   No notable dental hx     Cardiovascular  Rhythm: regular, Rate: normal    Pulmonary   Breath sounds clear to auscultation    Other Findings        Anesthesia Plan  ASA Score- 2     Anesthesia Type- IV sedation with anesthesia with ASA Monitors. Additional Monitors:     Airway Plan:            Plan Factors-Exercise tolerance (METS): >4 METS. Chart reviewed. Existing labs reviewed. Patient summary reviewed. Patient is not a current smoker. Induction- intravenous. Postoperative Plan-     Informed Consent- Anesthetic plan and risks discussed with patient. I personally reviewed this patient with the CRNA. Discussed and agreed on the Anesthesia Plan with the CRNA. Lety Rios

## 2023-10-18 NOTE — H&P
History and Physical - [facility]  Yaritza Chang 41 y.o. female MRN: [id number]    HPI: Yaritza Chang is a 41y.o. year old female who presents with constipation and LLQ pain.        Review of Systems    Historical Information   Past Medical History:   Diagnosis Date    Abnormal Pap smear of cervix     In her early 19's    Back pain     Irregular heart beat     Palpitations    Menorrhagia     Migraines      Past Surgical History:   Procedure Laterality Date    OOPHORECTOMY Left 12/7/2020    Procedure: SALPINGO-OOPHORECTOMY, LAPAROSCOPIC;  Surgeon: Paola Medeiros DO;  [facility];  Service: Gynecology    OVARIAN CYST SURGERY      [place] HYSTEROSCOPY BX ENDOMETRIUM&/POLYPC W/WO D&C N/A 11/9/2017    Procedure: DILATATION AND CURETTAGE (D&C) WITH HYSTEROSCOPY;  Surgeon: Lopez Eisenberg DO;  [facility];  Service: Gynecology    MN HYSTEROSCOPY ENDOMETRIAL ABLATION N/A 11/9/2017    Procedure: ABLATION ENDOMETRIAL  Renaye July;  Surgeon: Lopez Eisenberg DO;  [facility];  Service: Gynecology    [place] LAPS ABD PRTM&OMENTUM DX W/WO SPEC BR/WA [facility] N/A 12/7/2020    Procedure: LAPAROSCOPY DIAGNOSTIC;  Surgeon: Paola Medeiros DO;  [facility];  Service: Gynecology    WISDOM TOOTH EXTRACTION       Social History   Social History     Substance and Sexual Activity   Alcohol Use No     Social History     Substance and Sexual Activity   Drug Use Never     Social History     Tobacco Use   Smoking Status Never   Smokeless Tobacco Never     Family History   Problem Relation Age of Onset    No Known Problems Mother     No Known Problems Father     No Known Problems Brother     No Known Problems Daughter     No Known Problems Son     Dementia Maternal Grandmother     Heart disease Maternal Grandmother     Diabetes Maternal Grandmother     No Known Problems Maternal Grandfather     No Known Problems Paternal Grandmother     Heart disease Paternal Grandfather     Breast cancer Neg Hx     Colon cancer Neg Hx     Ovarian cancer Neg Hx     Uterine cancer Neg Hx     Cervical cancer Neg Hx        Meds/Allergies     (Not in a hospital admission)      No Known Allergies    Objective     /78   Pulse (!) 106   Temp (!) 96.7 °F (35.9 °C) (Temporal)   Resp 18   Ht 5' 3" (1.6 m)   Wt 72.1 kg (159 lb)   LMP 10/15/2023 (Exact Date) Comment: pt signed waiver.  had vasectomy and she has one ovary and has had ablations done  SpO2 100%   BMI 28.17 kg/m²       PHYSICAL EXAM    Gen: NAD  CV: RRR  CHEST: Clear  ABD: soft, NT/ND  EXT: no edema  Neuro: AAO      ASSESSMENT/PLAN:  This is a 41y.o. year old female here for constipation and LLQ pain.      PLAN:   Procedure: colonoscopy

## 2023-10-18 NOTE — ANESTHESIA POSTPROCEDURE EVALUATION
Post-Op Assessment Note    CV Status:  Stable  Pain Score: 0    Pain management: adequate     Mental Status:  Alert and awake   Hydration Status:  Euvolemic   PONV Controlled:  Controlled   Airway Patency:  Patent      Post Op Vitals Reviewed: Yes      Staff: CRNA         No notable events documented.     /58 (10/18/23 1023)    Temp (!) 97.3 °F (36.3 °C) (10/18/23 1023)    Pulse 82 (10/18/23 1023)   Resp 16 (10/18/23 1023)    SpO2 99 % (10/18/23 1023)
No

## 2023-10-20 ENCOUNTER — TELEPHONE (OUTPATIENT)
Dept: NEUROLOGY | Facility: CLINIC | Age: 43
End: 2023-10-20

## 2023-10-20 NOTE — TELEPHONE ENCOUNTER
received vm from 10/18 at 3:58pm-I, I just have a couple of drug interaction questions. If you could give me a call back at [phone]. My wife had surgery and she has a severe migraine afterwards. And were just wondering if she can take her rescue medicine. If you could give me a call back, I'd appreciate it and her name is Amanda rangel. Thank you, jefferson.  [phone]  -----------------------------------------  Left message for pt's  Jemma Omer to call office back to confirm what surgery pt had or advised that they could contact surgeon with their question.       Pt had colonoscopy on 10/18, not sure if this is what he was speaking about

## 2023-10-20 NOTE — TELEPHONE ENCOUNTER
Left detailed message regarding below, also advised that [address] sent pt a mychart message

## 2023-10-20 NOTE — TELEPHONE ENCOUNTER
received vm from 10/18 at 4:01pm-, yes I just wanted to check on some drug interaction questions. My wife just had surgery today, and she is in severe pain with a migraine. And we were wondering if she is able to take her rescue medicine with the medicine She was given the day, her name is Steffanie rangel and you gave me a call back. I'd appreciate it at [phone]. Thank you. Isabela.  ----------------------------------------------  Duplicate message     It looks like pt takes nurtec.       Please advise

## 2023-11-02 ENCOUNTER — TELEMEDICINE (OUTPATIENT)
Dept: NEUROLOGY | Facility: CLINIC | Age: 43
End: 2023-11-02
Payer: COMMERCIAL

## 2023-11-02 DIAGNOSIS — G43.709 CHRONIC MIGRAINE WITHOUT AURA WITHOUT STATUS MIGRAINOSUS, NOT INTRACTABLE: Primary | ICD-10-CM

## 2023-11-02 DIAGNOSIS — M79.18 MYOFASCIAL PAIN SYNDROME, CERVICAL: ICD-10-CM

## 2023-11-02 DIAGNOSIS — G43.829 MENSTRUAL MIGRAINE WITHOUT STATUS MIGRAINOSUS, NOT INTRACTABLE: ICD-10-CM

## 2023-11-02 PROCEDURE — 99212 OFFICE O/P EST SF 10 MIN: CPT | Performed by: PHYSICIAN ASSISTANT

## 2023-11-02 NOTE — PROGRESS NOTES
Virtual Regular Visit    Verification of patient location:    Patient is located at Home in the following state in which I hold an active license [place]      Assessment/Plan:    Problem List Items Addressed This Visit          Cardiovascular and Mediastinum    Chronic migraine without aura without status migrainosus, not intractable - Primary    Menstrual migraine without status migrainosus, not intractable       Musculoskeletal and Integument    Myofascial pain syndrome, cervical       Ms. Vera Buchanan is here for neurological follow-up for migraine headaches. She is satisfied with the current therapies as they are for migraine prevention and abortive treatment. We will continue Botox every 90 days approximately. Since starting botox, the patient reports greater than 7 days of migraine relief from baseline, correlated with headache diary, decreased abortive medication use and decreased ER visits. For prevention also continue 100 mg coq.10 daily. She uses Nurtec at the onset of migraine, and then if that fails she finds that sumatriptan injectable works after that. Add gabapentin, Benadryl, Advil cocktail at bedtime if needed to break the cycle. Previously she used indomethacin for low-grade headaches, so if she needs this again I am happy to refill it. No side effects to the above therapies. Continue to avoid triggers were possible. Recently with light sedation triggering a migraine during colonoscopy procedure. In the future she is okay to take sumatriptan or Nurtec after a procedure like this if she has a migraine. She should also let the anesthesiologist or nurse anesthetist know and could potentially get an IV medication before or after the procedure such as Toradol or Compazine. The patient should not hesitate to call me prior to her follow up with any questions or concerns.          Reason for visit is   Chief Complaint   Patient presents with    Virtual Regular Visit          Encounter provider Clemencia Galdamez PA-C    Provider located at [address] [facility] [address] [facility] [address] [place] [address]      Recent Visits  No visits were found meeting these conditions. Showing recent visits within past 7 days and meeting all other requirements  Today's Visits  Date Type Provider Dept   11/02/23 [address] [facility], [address] [facility] today's visits and meeting all other requirements  Future Appointments  No visits were found meeting these conditions. Showing future appointments within next 150 days and meeting all other requirements       The patient was identified by name and date of birth. Aldo Oates was informed that this is a telemedicine visit and that the visit is being conducted through the [employer]. She agrees to proceed. .  My office door was closed. No one else was in the room. She acknowledged consent and understanding of privacy and security of the video platform. The patient has agreed to participate and understands they can discontinue the visit at any time. Patient is aware this is a billable service. Iván Laura is a 41 y.o. female who is contacted via telemedicine for neurological follow-up. HPI   Botox is helping, gets a migraine at least once a week, in a bad month could be 6 times a month    In the past, migraines would occur once per month and last 3-4 days. She sometimes has blurry vision prior to the headache starting, but no focal deficits. No visual obscurations. She is dysfunctional during the headache because of the pain. Currently she takes Nurtec first at the onset of a migraine, and if that fails she uses sumatriptan injectable. She has not used indomethacin in a long time, she has not needed it. Other cocktail: Imitrex + advil +/- 100 mg gabapentin + benadryl if going to sleep.      Quality- pounding, dull, sharp  Location- either R or L temple, either R or L supraorbital, somtimes radiating into the mid frontal between the eyes  Associated with: nausea, emesis, photophobia and phonophobia, blurred vision, difficulty with focusing when looking at light i.e. Computer, neck stiffness, sore neck, sometimes dizziness     Triggers: weather changes, menstrual cycle (biggest trigger), stress/ tension, bright lights/ fluorescent lighting  Biggest triggers include menstrual cycle, stress, weather changes. Meds taken for headaches:  Prevention: Gabapentin (100 mg qhs, higher dose causes s/e), Coq10, Magnesium (nausea sometimes), Riboflavin, Tizanidine, Cyclobenzaprine, Verapamil, venlafaxine-never tried worried about side effects, Botox, tizanidine     Abortive: Imitrex PO, Imitrex injectable, Aleve, Replax- effective, Fioricet, Tramadol, Promethazine, compazine, frova, olanzapine, maxalt, Toradol, decadron- ineffective, indocin, nurtec     Other tx: PT for neck, chiropractor- triggered migraines     Neck tension and HAs are sometimes better towards end of day. She can sometimes get rid of a headache at the onset when she goes into a certain yoga position where she curls/ flexes the entire spine while the laying on the ground and flexes the neck with the forehead on the ground. Sometimes the migraine evolves anyway. States cervical and trap mm pain worsens with using arm weights in the gym, 10 lbs or more. Reports left shoulder pain. Denies sensory changes, weakness, falls, imbalance, incontinence. Did PT in the past which was helpful. The patient is on OCP birth control and she is not planning pregnancy.       Past Medical History:   Diagnosis Date    Abnormal Pap smear of cervix     In her early 21's    Back pain     Irregular heart beat     Palpitations    Menorrhagia     Migraines        Past Surgical History:   Procedure Laterality Date    OOPHORECTOMY Left 12/7/2020    Procedure: SALPINGO-OOPHORECTOMY, LAPAROSCOPIC;  Surgeon: Susie Riddle DO;  [facility];  Service: Gynecology    OVARIAN CYST SURGERY      OH HYSTEROSCOPY BX ENDOMETRIUM&/POLYPC W/WO D&C N/A 11/9/2017    Procedure: DILATATION AND CURETTAGE (D&C) WITH HYSTEROSCOPY;  Surgeon: Gina Jack DO;  [facility];  Service: Gynecology    OH HYSTEROSCOPY ENDOMETRIAL ABLATION N/A 11/9/2017    Procedure: ABLATION ENDOMETRIAL  Chan Pyo;  Surgeon: Gina Jack DO;  [facility];  Service: Gynecology    OH LAPS ABD PRTM&OMENTUM DX W/WO SPEC BR/WA SPX N/A 12/7/2020    Procedure: LAPAROSCOPY DIAGNOSTIC;  Surgeon: Susie Riddle DO;  [facility];  Service: Gynecology    WISDOM TOOTH EXTRACTION         Current Outpatient Medications   Medication Sig Dispense Refill    Botox 100 units       Coenzyme Q10 (COQ10) 100 MG CAPS 1 cap qd. 30 capsule 2    estradiol (ESTRACE) 0.1 mg/g vaginal cream Insert 0.5 g into the vagina 2 (two) times a week 42.5 g 3    gabapentin (NEURONTIN) 100 mg capsule TAKE 1 CAPSULE BY MOUTH AT BEDTIME 90 capsule 2    hyoscyamine (LEVSIN/SL) 0.125 mg SL tablet Take 1 tablet (0.125 mg total) by mouth every 4 (four) hours as needed for cramping 90 tablet 2    Incassia 0.35 MG tablet TAKE 1 TABLET (0.35 MG TOTAL) BY MOUTH 2 (TWO) TIMES A  tablet 3    Nurtec 75 MG TBDP TAKE 1 TAB AT MIGRAINE ONSET. NO MORE THAN ONE DOSE PER 24 HOURS. HOLD TRIPTAN. 8 tablet 5    SUMAtriptan (IMITREX) 100 mg tablet TAKE 1 TABLET AT MIGRAINE ONSET. MAY REPEAT IN 2 HOURS IF NEEDED (MAX 2TABS/DAY, 3TABS/WEEK) 9 tablet 5    SUMAtriptan (IMITREX) 6 mg/0.5 mL INJECT CONTENTS OF 1 VIAL UNDER SKIN FOR 1 DOSE 2.5 mL 0    Syringe/Needle, Disp, (SYRINGE 3CC/46GB7-8/4") 27G X 1-1/4" 3 ML MISC Use for Toradol intramuscular injections.  3 each 2    chlorhexidine (PERIDEX) 0.12 % solution  (Patient not taking: Reported on 8/28/2023)      loratadine (CLARITIN) 10 mg tablet TAKE 1 TABLET BY MOUTH EVERY DAY (Patient not taking: Reported on 5/15/2023) 90 tablet 1    omeprazole (PriLOSEC) 40 MG capsule Take 1 capsule (40 mg total) by mouth daily (Patient not taking: Reported on 11/2/2023) 30 capsule 0    predniSONE 10 mg tablet Take 1 tablet tid x3d then 1 tab bid x3d then 1 tab qd x3d (Patient not taking: Reported on 5/15/2023) 18 tablet 0    SODIUM FLUORIDE, DENTAL RINSE, 0.2 % SOLN  (Patient not taking: Reported on 5/15/2023)       Current Facility-Administered Medications   Medication Dose Route Frequency Provider Last Rate Last Admin    Botulinum Toxin Type A SOLR 200 Units  200 Units Injection Q3 Months Sunny Fraction, PA-C   200 Units at 02/17/23 1523        No Known Allergies    Review of Systems   Constitutional:  Negative for appetite change, fatigue and fever. HENT: Negative. Negative for hearing loss, tinnitus, trouble swallowing and voice change. Eyes: Negative. Negative for photophobia, pain and visual disturbance. Respiratory: Negative. Negative for shortness of breath. Cardiovascular: Negative. Negative for palpitations. Gastrointestinal: Negative. Negative for nausea and vomiting. Endocrine: Negative. Negative for cold intolerance. Genitourinary: Negative. Negative for dysuria, frequency and urgency. Musculoskeletal:  Negative for back pain, gait problem, myalgias and neck pain. Skin: Negative. Negative for rash. Allergic/Immunologic: Negative. Neurological:  Positive for headaches. Negative for dizziness, tremors, seizures, syncope, facial asymmetry, speech difficulty, weakness, light-headedness and numbness. Hematological: Negative. Does not bruise/bleed easily. Psychiatric/Behavioral: Negative. Negative for confusion, hallucinations and sleep disturbance. ROS reviewed. Video Exam    There were no vitals filed for this visit. Physical Exam   Neurological exam:  On neurologic exam, the patient is alert and oriented to time and place.  Speech is fluent and articulate, and the patient follows commands appropriately. Judgment and affect appear normal.  Extraocular muscles are intact without nystagmus. Face is symmetric. Hearing is intact bilaterally. Motor examination reveals adequate range of motion.       Visit Time  Total Visit Duration: 8 minutes

## 2023-11-04 DIAGNOSIS — G43.709 CHRONIC MIGRAINE WITHOUT AURA WITHOUT STATUS MIGRAINOSUS, NOT INTRACTABLE: Primary | ICD-10-CM

## 2023-11-08 DIAGNOSIS — R10.32 CHRONIC LLQ PAIN: ICD-10-CM

## 2023-11-08 DIAGNOSIS — G89.29 CHRONIC LLQ PAIN: ICD-10-CM

## 2023-11-13 NOTE — TELEPHONE ENCOUNTER
[facility] 11/10/23 at 9:52 am, taken off 11/13:    Good morning. My name is Leopoldo Fickle. I'm calling from [facility], calling for a mutual patient, Patti Boles, date of birth [birth year]. I was calling regarding a medication refill request we sent to your office for this patient's Botox. If you could please give us a call back to do a verbal prescription at #[phone]. If you'd like to send in the prescription, you can either e-scribe it to us, or fax it to the number [phone].

## 2023-11-15 RX ORDER — ONABOTULINUMTOXINA 100 [USP'U]/1
INJECTION, POWDER, LYOPHILIZED, FOR SOLUTION INTRADERMAL; INTRAMUSCULAR
Qty: 2 EACH | Refills: 3 | Status: SHIPPED | OUTPATIENT
Start: 2023-11-15

## 2023-11-17 ENCOUNTER — HOSPITAL ENCOUNTER (OUTPATIENT)
Dept: RADIOLOGY | Age: 43
Discharge: HOME | End: 2023-11-17
Payer: COMMERCIAL

## 2023-11-17 VITALS — WEIGHT: 159 LBS | HEIGHT: 63 IN | BODY MASS INDEX: 28.17 KG/M2

## 2023-11-17 DIAGNOSIS — Z12.31 OTHER SCREENING MAMMOGRAM: ICD-10-CM

## 2023-11-17 PROCEDURE — 77063 BREAST TOMOSYNTHESIS BI: CPT

## 2023-11-17 PROCEDURE — 77067 SCR MAMMO BI INCL CAD: CPT

## 2023-11-29 ENCOUNTER — TELEPHONE (OUTPATIENT)
Dept: NEUROLOGY | Facility: CLINIC | Age: 43
End: 2023-11-29

## 2023-11-29 NOTE — TELEPHONE ENCOUNTER
Submitted & Received the following Auth-Approval info via [employer]: through [address]:    Approved: Under Pharmacy Benefits. Botox-200 units. QTY:1, Q3 Months. Oj-Phhc-Ehidkctc for CPT: 89285. Auth# [id number]  Valid: 11/29/2023 until 02/29/2024    Please use [facility].

## 2023-11-30 DIAGNOSIS — N92.0 MENORRHAGIA WITH REGULAR CYCLE: Primary | ICD-10-CM

## 2023-11-30 NOTE — TELEPHONE ENCOUNTER
[address] and scheduled delivery with Lindsey for:     Botox-200 units  Qty:1  [facility]  Scheduled for 12/05/2023  Via: FedEx     Please advise if medication doesn't arrive.

## 2023-12-01 RX ORDER — NORETHINDRONE 0.35 MG/1
1 TABLET ORAL 2 TIMES DAILY
Qty: 168 TABLET | Refills: 3 | Status: SHIPPED | OUTPATIENT
Start: 2023-12-01

## 2023-12-05 NOTE — TELEPHONE ENCOUNTER
Botox number of units: 100 units  Botox quantity: 2  Arrived at what location: [facility]  Botox at Correct Administering Location: Yes  [address] number: [id number]  Lot number: [id number]  Expiration Date: 03/01/2026  Appt notes indicate correct medication: Yes

## 2023-12-08 ENCOUNTER — PROCEDURE VISIT (OUTPATIENT)
Dept: NEUROLOGY | Facility: CLINIC | Age: 43
End: 2023-12-08
Payer: COMMERCIAL

## 2023-12-08 VITALS — SYSTOLIC BLOOD PRESSURE: 119 MMHG | DIASTOLIC BLOOD PRESSURE: 60 MMHG | HEART RATE: 94 BPM | TEMPERATURE: 98.4 F

## 2023-12-08 DIAGNOSIS — G43.709 CHRONIC MIGRAINE WITHOUT AURA WITHOUT STATUS MIGRAINOSUS, NOT INTRACTABLE: Primary | ICD-10-CM

## 2023-12-08 PROCEDURE — 64615 CHEMODENERV MUSC MIGRAINE: CPT | Performed by: PHYSICIAN ASSISTANT

## 2023-12-08 NOTE — PROGRESS NOTES
Universal Protocol   Consent: Verbal consent obtained. Written consent obtained.   Risks and benefits: risks, benefits and alternatives were discussed  Consent given by: patient  Patient understanding: patient states understanding of the procedure being performed  Patient consent: the patient's understanding of the procedure matches consent given  Procedure consent: procedure consent matches procedure scheduled      Chemodenervation     Date/Time  12/8/2023 2:30 PM     Performed by  Miryam Horta PA-C   Authorized by  Miryam Horta PA-C     Pre-procedure details      Prepped With: Alcohol     Procedure details      Position:  Upright   Botox      Botox Type:  Type A    Brand:  Botox    mL's of Botulinum Toxin:  190    Final Concentration per CC:  100 units    Needle Gauge:  30 G 2.5 inch   Procedures      Botox Procedures: chronic headache      Indications: migraines     Injection Location      Head / Face:  L superior trapezius, R superior trapezius, L superior cervical paraspinal, R superior cervical paraspinal, L , R , procerus, L temporalis, R temporalis, R frontalis, L frontalis, R medial occipitalis and L medial occipitalis    L  injection amount:  5 unit(s)    R  injection amount:  5 unit(s)    L lateral frontalis:  5 unit(s)    R lateral frontalis:  5 unit(s)    L medial frontalis:  5 unit(s)    R medial frontalis:  5 unit(s)    L temporalis injection amount:  20 unit(s)    R temporalis injection amount:  20 unit(s)    Procerus injection amount:  5 unit(s)    L medial occipitalis injection amount:  15 unit(s)    R medial occipitalis injection amount:  15 unit(s)    L superior cervical paraspinal injection amount:  10 unit(s)    R superior cervical paraspinal injection amount:  10 unit(s)    L superior trapezius injection amount:  0 unit(s)    R superior trapezius injection amount:  0 unit(s)    Comments:  Avoided traps   Total Units      Total units used:  190 Total units discarded:  10   Post-procedure details      Chemodenervation:  Chronic migraine    Facial Nerve Location[de-identified]  Bilateral facial nerve    Patient tolerance of procedure: Tolerated well, no immediate complications   Comments       Extra units medically necessary:  - 10 between each orbicularis oculi (5 each side)  - 15 units between both cervical paraspinal muscles  - 40 between both anterior temporalis muscles  Avoided traps. Blood pressure 119/60, pulse 94, temperature 98.4 °F (36.9 °C), temperature source Temporal, last menstrual period 11/09/2023, not currently breastfeeding.

## 2024-01-03 DIAGNOSIS — R10.32 CHRONIC LLQ PAIN: ICD-10-CM

## 2024-01-03 DIAGNOSIS — G89.29 CHRONIC LLQ PAIN: ICD-10-CM

## 2024-01-12 DIAGNOSIS — G89.29 CHRONIC LLQ PAIN: ICD-10-CM

## 2024-01-12 DIAGNOSIS — R10.32 CHRONIC LLQ PAIN: ICD-10-CM

## 2024-01-12 DIAGNOSIS — G43.709 CHRONIC MIGRAINE WITHOUT AURA: ICD-10-CM

## 2024-01-12 RX ORDER — RIMEGEPANT SULFATE 75 MG/75MG
TABLET, ORALLY DISINTEGRATING ORAL
Qty: 8 TABLET | Refills: 5 | Status: SHIPPED | OUTPATIENT
Start: 2024-01-12

## 2024-01-16 ENCOUNTER — PATIENT MESSAGE (OUTPATIENT)
Dept: NEUROLOGY | Facility: CLINIC | Age: 44
End: 2024-01-16

## 2024-01-19 ENCOUNTER — TELEPHONE (OUTPATIENT)
Dept: NEUROLOGY | Facility: CLINIC | Age: 44
End: 2024-01-19

## 2024-01-19 NOTE — TELEPHONE ENCOUNTER
Approved per [facility]  Approvedtoday  Request Reference Number: [id number]. NURTEC TAB 75MG ODT is approved through 01/19/2025    Approval letter printed to be scanned into chart    [employer] message sent to pt

## 2024-01-19 NOTE — TELEPHONE ENCOUNTER
----- Message from Viridiana Martins sent at 1/16/2024  6:40 PM EST -----  Regarding: FW: Nurtec Approval  Contact: [phone]    ----- Message -----  From: Amanda Calhoun  Sent: 1/16/2024   5:29 PM EST  To: [facility]  Subject: Nurtec Approval                                  Hello,       [facility] status for [facility]illing the [facility] is that my insurance requires approval. [facility] says that they reached out to your office and are waiting to hear back. Is there any update on this process and or would you be able to check into this?    Thank you,          Amanda

## 2024-02-21 ENCOUNTER — TELEPHONE (OUTPATIENT)
Dept: NEUROLOGY | Facility: CLINIC | Age: 44
End: 2024-02-21

## 2024-02-21 PROBLEM — Z01.419 ENCOUNTER FOR ANNUAL ROUTINE GYNECOLOGICAL EXAMINATION: Status: RESOLVED | Noted: 2022-05-06 | Resolved: 2024-02-21

## 2024-02-21 NOTE — TELEPHONE ENCOUNTER
Called [facility] and spoke with Jas regarding the delivery status of patients Botox medication. Jas informed me that patients account is currently pending for patients consent to have medication delivered as well as needing to collect $1,173.31 Co-Pay showing owed.    Will follow up w/ patient regarding the status of her Botox order and offer to provide info on how to apply for the Botox Savings Program if interested to help save on cost of Botox if approved.

## 2024-02-28 NOTE — TELEPHONE ENCOUNTER
Called [facility] and scheduled delivery with Kayleen for:     Botox-200 units  Qty:1  Delivery to [place]  Scheduled for 02/29/2024  Via: FedEx     Please advise if medication doesn't arrive.

## 2024-02-29 NOTE — TELEPHONE ENCOUNTER
2/27/24, 2:47    Hello, this is [facility] calling in regards to Amanda Calhoun, date of birth [birth year]. We are calling to schedule a delivery of botox to her or for her appointment. If you can, please give us a call back at [phone]. Thank you. Have a great rest of your day.

## 2024-02-29 NOTE — TELEPHONE ENCOUNTER
Botox number of units: 100  Botox quantity: 2  Arrived at what location: [place]  Botox at Correct Administering Location: yes  NDC number: 0159-3949-09  Lot number: [id number]  Expiration Date: 3/30/26  Appt notes indicate correct medication: yes

## 2024-03-08 ENCOUNTER — PROCEDURE VISIT (OUTPATIENT)
Dept: NEUROLOGY | Facility: CLINIC | Age: 44
End: 2024-03-08
Payer: COMMERCIAL

## 2024-03-08 VITALS — DIASTOLIC BLOOD PRESSURE: 78 MMHG | HEART RATE: 96 BPM | SYSTOLIC BLOOD PRESSURE: 120 MMHG | TEMPERATURE: 98.9 F

## 2024-03-08 DIAGNOSIS — G43.709 CHRONIC MIGRAINE WITHOUT AURA WITHOUT STATUS MIGRAINOSUS, NOT INTRACTABLE: Primary | ICD-10-CM

## 2024-03-08 PROCEDURE — 64615 CHEMODENERV MUSC MIGRAINE: CPT | Performed by: PHYSICIAN ASSISTANT

## 2024-03-08 NOTE — PROGRESS NOTES
Universal Protocol   Consent: Verbal consent obtained. Written consent obtained.  Risks and benefits: risks, benefits and alternatives were discussed  Consent given by: patient  Patient understanding: patient states understanding of the procedure being performed  Patient consent: the patient's understanding of the procedure matches consent given  Procedure consent: procedure consent matches procedure scheduled      Chemodenervation     Date/Time  3/8/2024 2:00 PM     Performed by  Lou Snyder PA-C   Authorized by  Lou Snyder PA-C     Pre-procedure details      Prepped With: Alcohol     Procedure details      Position:  Upright   Botox      Botox Type:  Type A    Brand:  Botox    mL's of Botulinum Toxin:  190    Final Concentration per CC:  100 units    Needle Gauge:  30 G 2.5 inch   Procedures      Botox Procedures: chronic headache      Indications: migraines     Injection Location      Head / Face:  L superior trapezius, R superior trapezius, L superior cervical paraspinal, R superior cervical paraspinal, L , R , procerus, L temporalis, R temporalis, R frontalis, L frontalis, R medial occipitalis and L medial occipitalis    L  injection amount:  5 unit(s)    R  injection amount:  5 unit(s)    L lateral frontalis:  5 unit(s)    R lateral frontalis:  5 unit(s)    L medial frontalis:  5 unit(s)    R medial frontalis:  5 unit(s)    L temporalis injection amount:  20 unit(s)    R temporalis injection amount:  20 unit(s)    Procerus injection amount:  5 unit(s)    L medial occipitalis injection amount:  15 unit(s)    R medial occipitalis injection amount:  15 unit(s)    L superior cervical paraspinal injection amount:  10 unit(s)    R superior cervical paraspinal injection amount:  10 unit(s)    L superior trapezius injection amount:  0 unit(s)    R superior trapezius injection amount:  0 unit(s)    Comments:  None   Total Units      Total units used:  190    Total units  discarded:  10   Post-procedure details      Chemodenervation:  Chronic migraine    Facial Nerve Location::  Bilateral facial nerve    Patient tolerance of procedure:  Tolerated well, no immediate complications   Comments       Extra units medically necessary:  - 10 between each orbicularis oculi (5 each side)  - 15 units between both cervical paraspinal muscles  - 40 between both anterior temporalis muscles  Avoided traps.       Blood pressure 120/78, pulse 96, temperature 98.9 °F (37.2 °C), temperature source Temporal, not currently breastfeeding.    Pt FT now, needing to take nurtec about once per week which I am okay with.

## 2024-05-31 ENCOUNTER — TELEPHONE (OUTPATIENT)
Dept: NEUROLOGY | Facility: CLINIC | Age: 44
End: 2024-05-31

## 2024-05-31 NOTE — TELEPHONE ENCOUNTER
Called [facility] and scheduled delivery with Elenita for:     Botox-200 units  Qty:1  Delivery to [place]  Scheduled for 06/04/2024  Via: FedEx     Please advise if medication doesn't arrive.

## 2024-06-04 NOTE — TELEPHONE ENCOUNTER
Botox number of units: 100  Botox quantity: 2  Arrived at what location: [place]  Botox at Correct Administering Location: yes  NDC number: [id number]  Lot number: [id number]  Expiration Date: 4/30/2026  Appt notes indicate correct medication: yes

## 2024-06-07 ENCOUNTER — PROCEDURE VISIT (OUTPATIENT)
Dept: NEUROLOGY | Facility: CLINIC | Age: 44
End: 2024-06-07
Payer: COMMERCIAL

## 2024-06-07 VITALS — SYSTOLIC BLOOD PRESSURE: 110 MMHG | DIASTOLIC BLOOD PRESSURE: 67 MMHG | TEMPERATURE: 98.2 F | HEART RATE: 85 BPM

## 2024-06-07 DIAGNOSIS — G43.709 CHRONIC MIGRAINE WITHOUT AURA WITHOUT STATUS MIGRAINOSUS, NOT INTRACTABLE: Primary | ICD-10-CM

## 2024-06-07 PROCEDURE — 64615 CHEMODENERV MUSC MIGRAINE: CPT | Performed by: PHYSICIAN ASSISTANT

## 2024-06-07 RX ORDER — SUMATRIPTAN 6 MG/.5ML
INJECTION, SOLUTION SUBCUTANEOUS
Qty: 2.5 ML | Refills: 5 | Status: SHIPPED | OUTPATIENT
Start: 2024-06-07

## 2024-06-07 NOTE — PROGRESS NOTES
Universal Protocol   Consent: Verbal consent obtained. Written consent obtained.  Risks and benefits: risks, benefits and alternatives were discussed  Consent given by: patient  Patient understanding: patient states understanding of the procedure being performed  Patient consent: the patient's understanding of the procedure matches consent given  Procedure consent: procedure consent matches procedure scheduled      Chemodenervation     Date/Time  6/7/2024 2:00 PM     Performed by  Lou Snyder PA-C   Authorized by  Lou Snyder PA-C     Pre-procedure details      Prepped With: Alcohol     Procedure details      Position:  Upright   Botox      Botox Type:  Type A    Brand:  Botox    mL's of Botulinum Toxin:  190    Final Concentration per CC:  100 units    Needle Gauge:  30 G 2.5 inch   Procedures      Botox Procedures: chronic headache      Indications: migraines     Injection Location      Head / Face:  L superior trapezius, R superior trapezius, L superior cervical paraspinal, R superior cervical paraspinal, L , R , procerus, L temporalis, R temporalis, R frontalis, L frontalis, R medial occipitalis and L medial occipitalis    L  injection amount:  5 unit(s)    R  injection amount:  5 unit(s)    L lateral frontalis:  5 unit(s)    R lateral frontalis:  5 unit(s)    L medial frontalis:  5 unit(s)    R medial frontalis:  5 unit(s)    L temporalis injection amount:  20 unit(s)    R temporalis injection amount:  20 unit(s)    Procerus injection amount:  5 unit(s)    L medial occipitalis injection amount:  15 unit(s)    R medial occipitalis injection amount:  15 unit(s)    L superior cervical paraspinal injection amount:  10 unit(s)    R superior cervical paraspinal injection amount:  10 unit(s)    L superior trapezius injection amount:  0 unit(s)    R superior trapezius injection amount:  0 unit(s)   Total Units      Total units used:  190    Total units discarded:  10    Post-procedure details      Chemodenervation:  Chronic migraine    Facial Nerve Location::  Bilateral facial nerve    Patient tolerance of procedure:  Tolerated well, no immediate complications   Comments       Extra units medically necessary:  - 10 between each orbicularis oculi (5 each side)  - 15 units between both cervical paraspinal muscles  - 40 between both anterior temporalis muscles  Avoided traps.       Blood pressure 110/67, pulse 85, temperature 98.2 °F (36.8 °C), temperature source Temporal, not currently breastfeeding.

## 2024-06-28 DIAGNOSIS — N95.2 VAGINAL ATROPHY: ICD-10-CM

## 2024-06-28 RX ORDER — ESTRADIOL 0.1 MG/G
0.5 CREAM VAGINAL 2 TIMES WEEKLY
Qty: 42.5 G | Refills: 0 | Status: SHIPPED | OUTPATIENT
Start: 2024-07-01 | End: 2024-07-01 | Stop reason: SDUPTHER

## 2024-07-01 RX ORDER — ESTRADIOL 0.1 MG/G
0.5 CREAM VAGINAL 2 TIMES WEEKLY
Qty: 42.5 G | Refills: 0 | Status: SHIPPED | OUTPATIENT
Start: 2024-07-01

## 2024-08-22 ENCOUNTER — TELEPHONE (OUTPATIENT)
Age: 44
End: 2024-08-22

## 2024-08-22 NOTE — TELEPHONE ENCOUNTER
Inbound call received from [facility] to request a call back to set up Botox delivery.     Botox team: Please call [facility] back at [phone]. Thank you!

## 2024-08-27 NOTE — TELEPHONE ENCOUNTER
Second Request:   Inbound call received from [facility] with [facility] to request a call back to set up Botox delivery.      Botox team: Please call [facility] back at [phone]. Thank you!

## 2024-08-30 NOTE — TELEPHONE ENCOUNTER
Botox number of units: 100 units  Botox quantity: 2  Arrived at what location: [place]  Botox at Correct Administering Location: Yes  NDC number: [id number]  Lot number: [id number]  Expiration Date: 08/01/2026  Appt notes indicate correct medication: Yes

## 2024-09-05 DIAGNOSIS — G43.709 CHRONIC MIGRAINE WITHOUT AURA: ICD-10-CM

## 2024-09-05 RX ORDER — RIMEGEPANT SULFATE 75 MG/75MG
TABLET, ORALLY DISINTEGRATING ORAL
Qty: 8 TABLET | Refills: 5 | Status: SHIPPED | OUTPATIENT
Start: 2024-09-05

## 2024-09-06 ENCOUNTER — PROCEDURE VISIT (OUTPATIENT)
Dept: NEUROLOGY | Facility: CLINIC | Age: 44
End: 2024-09-06
Payer: COMMERCIAL

## 2024-09-06 VITALS — SYSTOLIC BLOOD PRESSURE: 117 MMHG | DIASTOLIC BLOOD PRESSURE: 63 MMHG | TEMPERATURE: 97.9 F | HEART RATE: 79 BPM

## 2024-09-06 DIAGNOSIS — G43.709 CHRONIC MIGRAINE WITHOUT AURA WITHOUT STATUS MIGRAINOSUS, NOT INTRACTABLE: Primary | ICD-10-CM

## 2024-09-06 PROCEDURE — 64615 CHEMODENERV MUSC MIGRAINE: CPT | Performed by: PHYSICIAN ASSISTANT

## 2024-09-06 NOTE — PROGRESS NOTES
Universal Protocol   Consent: Verbal consent obtained. Written consent obtained.  Risks and benefits: risks, benefits and alternatives were discussed  Consent given by: patient  Patient understanding: patient states understanding of the procedure being performed  Patient consent: the patient's understanding of the procedure matches consent given  Procedure consent: procedure consent matches procedure scheduled      Chemodenervation     Date/Time  9/6/2024 2:00 PM     Performed by  Lou Snyder PA-C   Authorized by  Lou Snyder PA-C     Pre-procedure details      Prepped With: Alcohol     Procedure details      Position:  Upright   Botox      Botox Type:  Type A    Brand:  Botox    mL's of Botulinum Toxin:  190    Final Concentration per CC:  100 units    Needle Gauge:  30 G 2.5 inch   Procedures      Botox Procedures: chronic headache      Indications: migraines     Injection Location      Head / Face:  L superior trapezius, R superior trapezius, L superior cervical paraspinal, R superior cervical paraspinal, L , R , procerus, L temporalis, R temporalis, R frontalis, L frontalis, R medial occipitalis and L medial occipitalis    L  injection amount:  5 unit(s)    R  injection amount:  5 unit(s)    L lateral frontalis:  5 unit(s)    R lateral frontalis:  5 unit(s)    L medial frontalis:  5 unit(s)    R medial frontalis:  5 unit(s)    L temporalis injection amount:  20 unit(s)    R temporalis injection amount:  20 unit(s)    Procerus injection amount:  5 unit(s)    L medial occipitalis injection amount:  15 unit(s)    R medial occipitalis injection amount:  15 unit(s)    L superior cervical paraspinal injection amount:  10 unit(s)    R superior cervical paraspinal injection amount:  10 unit(s)    L superior trapezius injection amount:  0 unit(s)    R superior trapezius injection amount:  0 unit(s)   Total Units      Total units used:  190    Total units discarded:  10    Post-procedure details      Chemodenervation:  Chronic migraine    Facial Nerve Location::  Bilateral facial nerve    Patient tolerance of procedure:  Tolerated well, no immediate complications   Comments       Extra units medically necessary:  - 10 between each orbicularis oculi (5 each side)  - 15 units between both cervical paraspinal muscles  - 40 between both anterior temporalis muscles  Avoided traps.       Blood pressure 117/63, pulse 79, temperature 97.9 °F (36.6 °C), temperature source Temporal, not currently breastfeeding.

## 2024-09-25 ENCOUNTER — ANNUAL EXAM (OUTPATIENT)
Dept: OBGYN CLINIC | Facility: CLINIC | Age: 44
End: 2024-09-25
Payer: COMMERCIAL

## 2024-09-25 VITALS
BODY MASS INDEX: 26.58 KG/M2 | WEIGHT: 150 LBS | SYSTOLIC BLOOD PRESSURE: 118 MMHG | HEIGHT: 63 IN | DIASTOLIC BLOOD PRESSURE: 80 MMHG

## 2024-09-25 DIAGNOSIS — R10.2 PELVIC PAIN: ICD-10-CM

## 2024-09-25 DIAGNOSIS — Z01.419 ENCOUNTER FOR ANNUAL ROUTINE GYNECOLOGICAL EXAMINATION: Primary | ICD-10-CM

## 2024-09-25 PROCEDURE — S0612 ANNUAL GYNECOLOGICAL EXAMINA: HCPCS | Performed by: OBSTETRICS & GYNECOLOGY

## 2024-09-25 NOTE — ASSESSMENT & PLAN NOTE
Pap/HPV current  Mammogram ordered      Encourage healthy diet, exercise, Calcium 1200mg per day and at least 800 iu Vitamin D daily.

## 2024-09-25 NOTE — PROGRESS NOTES
Assessment/Plan:   Encounter for annual routine gynecological examination  Pap/HPV current  Mammogram ordered      Encourage healthy diet, exercise, Calcium 1200mg per day and at least 800 iu Vitamin D daily.         Diagnoses and all orders for this visit:    Encounter for annual routine gynecological examination    Pelvic pain  Comments:  Right sided. H/o ovarian cysts.  Uterine prolapse noted on exam. Information on PT given .Will call with results of ultrasound  Orders:  -     US pelvis complete w transvaginal; Future          Subjective:     Patient ID: Amanda Calhoun is a 43 y.o. female.    Patient here for yearly exam.  Pap: 5/15/23 neg/neg  Mammo: 11/17/23 neg; scheduled 11/22/24  Currently Sexually Active - no issues  Declined STD testing  Birth Control: vasectomy  LMP: ablation 9/3/24  Gardasil: not completed  Patient having pelvic pain near right ovary, sometimes pain shoots down the leg.    Had been on progesterone for treatment of heavy menses and pelvic pain. Pain resolved then stopped progesterone 4 months ago.  Menses are regualr. Heavy  for 2 days then taper off.  Pain was good until the last month or so. Pain on right side. Fullness/achy/pressure.  Some sharp twinges.  NO change in bowel.  Bladder - some increase in urgency.     Gynecologic Exam  She complains of pelvic pain. She reports no genital itching, genital lesions, genital odor, genital rash, vaginal bleeding or vaginal discharge. This is a new problem. The current episode started more than 1 month ago. The problem occurs intermittently. The problem is unchanged. The pain is moderate. The problem affects the right side. Pertinent negatives include no chills, constipation, diarrhea, fever, frequency, nausea, sore throat or vomiting.     Review of Systems   Constitutional:  Negative for activity change, appetite change, chills, fatigue and fever.   HENT:  Negative for rhinorrhea, sneezing and sore throat.    Eyes:  Negative for visual  disturbance.   Respiratory:  Negative for cough, shortness of breath and wheezing.    Cardiovascular:  Negative for chest pain, palpitations and leg swelling.   Gastrointestinal:  Negative for abdominal distention, constipation, diarrhea, nausea and vomiting.   Genitourinary:  Positive for pelvic pain. Negative for difficulty urinating, frequency, menstrual problem and vaginal discharge.   Neurological:  Negative for syncope and light-headedness.         Objective:     Physical Exam  Chest:   Breasts:     Breasts are symmetrical.      Right: No inverted nipple, mass, nipple discharge, skin change or tenderness.      Left: No inverted nipple, mass, nipple discharge, skin change or tenderness.   Genitourinary:     Labia:         Right: No rash, tenderness, lesion or injury.         Left: No rash, tenderness, lesion or injury.       Vagina: Normal. No vaginal discharge, erythema, tenderness or bleeding.      Cervix: No cervical motion tenderness, discharge, friability, erythema or cervical bleeding.      Uterus: With uterine prolapse (1/3 length of vagina, parous cervix). Not deviated, not enlarged, not fixed and not tender.       Adnexa:         Right: No mass, tenderness or fullness.          Left: No mass, tenderness or fullness.     Neurological:      Mental Status: She is alert and oriented to person, place, and time.

## 2024-10-31 DIAGNOSIS — N92.0 MENORRHAGIA WITH REGULAR CYCLE: ICD-10-CM

## 2024-10-31 RX ORDER — NORETHINDRONE 0.35 MG/1
1 TABLET ORAL 2 TIMES DAILY
Qty: 168 TABLET | Refills: 1 | Status: SHIPPED | OUTPATIENT
Start: 2024-10-31

## 2024-11-13 DIAGNOSIS — G43.709 CHRONIC MIGRAINE WITHOUT AURA WITHOUT STATUS MIGRAINOSUS, NOT INTRACTABLE: ICD-10-CM

## 2024-11-14 ENCOUNTER — HOSPITAL ENCOUNTER (OUTPATIENT)
Dept: ULTRASOUND IMAGING | Facility: HOSPITAL | Age: 44
Discharge: HOME | End: 2024-11-14
Attending: OBSTETRICS & GYNECOLOGY
Payer: COMMERCIAL

## 2024-11-14 DIAGNOSIS — R10.2 PELVIC PAIN: ICD-10-CM

## 2024-11-14 PROCEDURE — 76856 US EXAM PELVIC COMPLETE: CPT

## 2024-11-14 PROCEDURE — 76830 TRANSVAGINAL US NON-OB: CPT

## 2024-11-15 RX ORDER — ONABOTULINUMTOXINA 100 [USP'U]/1
INJECTION, POWDER, LYOPHILIZED, FOR SOLUTION INTRADERMAL; INTRAMUSCULAR
Qty: 2 EACH | Refills: 3 | Status: SHIPPED | OUTPATIENT
Start: 2024-11-15

## 2024-11-20 ENCOUNTER — RESULTS FOLLOW-UP (OUTPATIENT)
Dept: OBGYN CLINIC | Facility: CLINIC | Age: 44
End: 2024-11-20

## 2024-11-20 DIAGNOSIS — R10.2 PELVIC PAIN: Primary | ICD-10-CM

## 2024-11-20 DIAGNOSIS — N83.201 RIGHT OVARIAN CYST: ICD-10-CM

## 2024-11-22 ENCOUNTER — TELEPHONE (OUTPATIENT)
Age: 44
End: 2024-11-22

## 2024-11-22 ENCOUNTER — HOSPITAL ENCOUNTER (OUTPATIENT)
Dept: RADIOLOGY | Age: 44
Discharge: HOME | End: 2024-11-22
Payer: COMMERCIAL

## 2024-11-22 VITALS — WEIGHT: 150 LBS | BODY MASS INDEX: 26.58 KG/M2 | HEIGHT: 63 IN

## 2024-11-22 DIAGNOSIS — Z12.31 OTHER SCREENING MAMMOGRAM: ICD-10-CM

## 2024-11-22 PROCEDURE — 77063 BREAST TOMOSYNTHESIS BI: CPT

## 2024-11-22 PROCEDURE — 77067 SCR MAMMO BI INCL CAD: CPT

## 2024-11-22 NOTE — TELEPHONE ENCOUNTER
Patient had called wanting to schedule a surgery consult with Dr. Portillo. Based off the message that Dr. Portillo had sent her on my chart regarding the ovarian cyst, patient is worried since it has grown and is painful. Please advise patient prefers to see Dr. Portillo and can be reached at [phone].

## 2024-11-25 ENCOUNTER — TELEPHONE (OUTPATIENT)
Dept: NEUROLOGY | Facility: CLINIC | Age: 44
End: 2024-11-25

## 2024-11-25 NOTE — TELEPHONE ENCOUNTER
Scheduled Ms. Calhoun for a consultation with Dr. Silvana Caruso on Wednesday, December 11, 2024 @ 1:45pm. Please advise.

## 2024-11-25 NOTE — TELEPHONE ENCOUNTER
Botox number of units: 100  Botox quantity: 2  Arrived at what location: CV  Botox at Correct Administering Location: yes  NDC number: [id number]  Lot number: [id number]  Expiration Date: 12/2026  Appt notes indicate correct medication: yes

## 2024-11-27 DIAGNOSIS — G43.709 CHRONIC MIGRAINE WITHOUT AURA WITHOUT STATUS MIGRAINOSUS, NOT INTRACTABLE: ICD-10-CM

## 2024-11-29 RX ORDER — SUMATRIPTAN 6 MG/.5ML
INJECTION, SOLUTION SUBCUTANEOUS
Qty: 2.5 ML | Refills: 5 | Status: SHIPPED | OUTPATIENT
Start: 2024-11-29

## 2024-12-06 ENCOUNTER — PROCEDURE VISIT (OUTPATIENT)
Dept: NEUROLOGY | Facility: CLINIC | Age: 44
End: 2024-12-06
Payer: COMMERCIAL

## 2024-12-06 VITALS — TEMPERATURE: 97.8 F | HEART RATE: 96 BPM | SYSTOLIC BLOOD PRESSURE: 117 MMHG | DIASTOLIC BLOOD PRESSURE: 68 MMHG

## 2024-12-06 DIAGNOSIS — G43.709 CHRONIC MIGRAINE WITHOUT AURA WITHOUT STATUS MIGRAINOSUS, NOT INTRACTABLE: Primary | ICD-10-CM

## 2024-12-06 PROCEDURE — 64615 CHEMODENERV MUSC MIGRAINE: CPT | Performed by: PHYSICIAN ASSISTANT

## 2024-12-06 NOTE — PROGRESS NOTES
Universal Protocol   Consent: Verbal consent obtained. Written consent obtained.  Risks and benefits: risks, benefits and alternatives were discussed  Consent given by: patient  Patient understanding: patient states understanding of the procedure being performed  Patient consent: the patient's understanding of the procedure matches consent given  Procedure consent: procedure consent matches procedure scheduled      Chemodenervation     Date/Time  12/6/2024 1:30 PM     Performed by  Lou Snyder PA-C   Authorized by  Lou Snyder PA-C     Pre-procedure details      Prepped With: Alcohol     Procedure details      Position:  Upright   Botox      Botox Type:  Type A    Brand:  Botox    mL's of Botulinum Toxin:  200    Final Concentration per CC:  100 units    Needle Gauge:  30 G 2.5 inch   Procedures      Botox Procedures: chronic headache      Indications: migraines     Injection Location      Head / Face:  L superior trapezius, R superior trapezius, L superior cervical paraspinal, R superior cervical paraspinal, L , R , procerus, L temporalis, R temporalis, R frontalis, L frontalis, R medial occipitalis and L medial occipitalis    L  injection amount:  5 unit(s)    R  injection amount:  5 unit(s)    L lateral frontalis:  5 unit(s)    R lateral frontalis:  5 unit(s)    L medial frontalis:  5 unit(s)    R medial frontalis:  5 unit(s)    L temporalis injection amount:  20 unit(s)    R temporalis injection amount:  20 unit(s)    Procerus injection amount:  5 unit(s)    L medial occipitalis injection amount:  15 unit(s)    R medial occipitalis injection amount:  15 unit(s)    L superior cervical paraspinal injection amount:  10 unit(s)    R superior cervical paraspinal injection amount:  10 unit(s)    L superior trapezius injection amount:  0 unit(s)    R superior trapezius injection amount:  0 unit(s)    Comments:  No u   Total Units      Total units used:  200    Total units  discarded:  0   Post-procedure details      Chemodenervation:  Chronic migraine    Facial Nerve Location::  Bilateral facial nerve    Patient tolerance of procedure:  Tolerated well, no immediate complications   Comments       Extra units medically necessary:  - 10 between each orbicularis oculi (5 each side)  - 25 units between both splenius capitis muscles  - 40 between both anterior temporalis muscles  Avoided traps.     Blood pressure 117/68, pulse 96, temperature 97.8 °F (36.6 °C), temperature source Temporal, last menstrual period 11/18/2024, not currently breastfeeding.

## 2024-12-11 ENCOUNTER — CONSULT (OUTPATIENT)
Dept: OBGYN CLINIC | Facility: CLINIC | Age: 44
End: 2024-12-11
Payer: COMMERCIAL

## 2024-12-11 VITALS
SYSTOLIC BLOOD PRESSURE: 128 MMHG | WEIGHT: 152.6 LBS | BODY MASS INDEX: 26.05 KG/M2 | DIASTOLIC BLOOD PRESSURE: 68 MMHG | HEIGHT: 64 IN

## 2024-12-11 DIAGNOSIS — N83.201 RIGHT OVARIAN CYST: Primary | ICD-10-CM

## 2024-12-11 PROCEDURE — 99212 OFFICE O/P EST SF 10 MIN: CPT | Performed by: OBSTETRICS & GYNECOLOGY

## 2024-12-11 NOTE — PROGRESS NOTES
"Name: Amanda Calhoun      : 1980      MRN: [id number]  Encounter Provider: Silvana Portillo MD  Encounter Date: 2024   Encounter department: [facility]  :  Assessment & Plan  Right ovarian cyst  Options discussed: diagnostic laparoscopy with removal of cyst and or ovary vs observation.  Patient is not having symptoms at this time. Concerned about surgery due to h/o LSO. Does not want to risk menopause at this time.  Follow up imaging ordered.  If symptoms change or would like to pursue surgery will call for sooner evaluation. Otherwise, plan follow up annual visit.            History of Present Illness   HPI  Amanda Calhoun is a 44 y.o. female who presents to discuss ovarian cyst removal. Pelvic US completed . Has questions regarding her scan. Pain with cyst comes and goes.       Review of Systems       Objective   /68 (BP Location: Left arm, Patient Position: Sitting, Cuff Size: Standard)   Ht 5' 3.5\" (1.613 m)   Wt 69.2 kg (152 lb 9.6 oz)   LMP 2024 (Exact Date)   BMI 26.61 kg/m²      Physical Exam      "

## 2024-12-16 NOTE — ASSESSMENT & PLAN NOTE
Options discussed: diagnostic laparoscopy with removal of cyst and or ovary vs observation.  Patient is not having symptoms at this time. Concerned about surgery due to h/o LSO. Does not want to risk menopause at this time.  Follow up imaging ordered.  If symptoms change or would like to pursue surgery will call for sooner evaluation. Otherwise, plan follow up annual visit.

## 2025-01-06 ENCOUNTER — TELEPHONE (OUTPATIENT)
Dept: NEUROLOGY | Facility: CLINIC | Age: 45
End: 2025-01-06

## 2025-01-06 NOTE — TELEPHONE ENCOUNTER
Received via [email or web address] request for prior auth for Nurtec.  Request scanned into Media Manager.

## 2025-01-07 NOTE — TELEPHONE ENCOUNTER
[facility]    Nurtec initiated on CMM. Key [id number]    Awaiting determination

## 2025-01-13 NOTE — TELEPHONE ENCOUNTER
[facility]    [id number]. NURTEC TAB 75MG ODT is approved through 01/07/2026     Approval letter scanned in media    Dispenses     Dispensed Days Supply Quantity Provider Pharmacy   [facility]  75 MG TBDP 01/02/2025 30 8 tablet Lou Snyder PA-C [facility]/pharmacy #[id number] - N...

## 2025-01-16 ENCOUNTER — TELEMEDICINE (OUTPATIENT)
Dept: NEUROLOGY | Facility: CLINIC | Age: 45
End: 2025-01-16
Payer: COMMERCIAL

## 2025-01-16 DIAGNOSIS — M79.18 MYOFASCIAL PAIN SYNDROME, CERVICAL: ICD-10-CM

## 2025-01-16 DIAGNOSIS — G43.829 MENSTRUAL MIGRAINE WITHOUT STATUS MIGRAINOSUS, NOT INTRACTABLE: ICD-10-CM

## 2025-01-16 DIAGNOSIS — G43.709 CHRONIC MIGRAINE WITHOUT AURA WITHOUT STATUS MIGRAINOSUS, NOT INTRACTABLE: Primary | ICD-10-CM

## 2025-01-16 PROCEDURE — 99213 OFFICE O/P EST LOW 20 MIN: CPT | Performed by: PHYSICIAN ASSISTANT

## 2025-01-16 NOTE — PROGRESS NOTES
Virtual Regular Visit  Name: Amanda Calhoun      : 1980      MRN: [id number]  Encounter Provider: Lou Snyder PA-C  Encounter Date: 2025   Encounter department: NEUROLOGY [facility]      Verification of patient location:  Patient is located at Home in the following state in which I hold an active license [place] :  Assessment & Plan  Chronic migraine without aura without status migrainosus, not intractable         Menstrual migraine without status migrainosus, not intractable         Myofascial pain syndrome, cervical         Migraine headaches are well-managed with Botox for prevention.  Since starting botox, the patient reports greater than 7 days of migraine relief from baseline, correlated with headache diary, decreased abortive medication use and decreased ER visits.  At the onset she usually takes Nurtec and sumatriptan at that fails.  She usually takes sumatriptan injection if she is traveling or flying as that triggers a severe migraine and sumatriptan injection works quickly.  If Nurtec causes nausea and the migraine headache is worse after taking Nurtec or not improved, she will take sumatriptan.    The patient should not hesitate to call me prior to her follow up with any questions or concerns.        Encounter provider Lou Snyder PA-C    The patient was identified by name and date of birth. Amanda Calhoun was informed that this is a telemedicine visit and that the visit is being conducted through the Epic Embedded platform. She agrees to proceed..  My office door was closed. No one else was in the room.  She acknowledged consent and understanding of privacy and security of the video platform. The patient has agreed to participate and understands they can discontinue the visit at any time.    Patient is aware this is a billable service.     History of Present Illness     HPI Botox reauth. Patient states migraines are the same as her last visit. She is getting 6-8 migraines  a month, medications do help.    States on [place] Day she had new symptoms where she felt a twitching or shaking feeling in side lasting a few seconds.  She states it would like everything in the room was moving up and down and then she felt something was moving inside her brain from the back of the head, not on the outside.  Not the muscles.  She could feel a twitching or shaking inside.  She was not doing anything at the time they were all just sitting in the room.      She also states it felt like her vision was moving up and down for a few seconds and then she felt a little bit of a strain in the right eye.  She states no further episodes since then and she thinks it was triggered by excessive work on the computer, she was working long hours.  She sees an eye doctor regularly and has astigmatism.  States her eye always feels tired now and on and off it will hurt but it is not significant and getting better over time.  Last eye appointment was 2 years ago and she is going to schedule another one soon.     Prior note:  In the past, migraines would occur once per month and last 3-4 days.  She sometimes has blurry vision prior to the headache starting, but no focal deficits.  No visual obscurations.  She is dysfunctional during the headache because of the pain.     Currently she takes Nurtec first at the onset of a migraine, and if that fails she uses sumatriptan injectable.  She has not used indomethacin in a long time, she has not needed it.     Other cocktail: Imitrex + advil +/- 100 mg gabapentin + benadryl if going to sleep.     Quality- pounding, dull, sharp  Location- either R or L temple, either R or L supraorbital, somtimes radiating into the mid frontal between the eyes  Associated with: nausea, emesis, photophobia and phonophobia, blurred vision, difficulty with focusing when looking at light i.e. Computer, neck stiffness, sore neck, sometimes dizziness     Triggers: weather changes, menstrual cycle  (biggest trigger), stress/ tension, bright lights/ fluorescent lighting  Biggest triggers include menstrual cycle, stress, weather changes.       Meds taken for headaches:  Prevention: Gabapentin (100 mg qhs, higher dose causes s/e), Coq10, Magnesium (nausea sometimes), Riboflavin, Tizanidine, Cyclobenzaprine, Verapamil, venlafaxine-never tried worried about side effects, Botox, tizanidine     Abortive: Imitrex PO, Imitrex injectable, Aleve, Replax- effective, Fioricet, Tramadol, Promethazine, compazine, frova, olanzapine, maxalt, Toradol, decadron- ineffective, indocin, nurtec     Other tx: PT for neck, chiropractor- triggered migraines     Neck tension and HAs are sometimes better towards end of day. She can sometimes get rid of a headache at the onset when she goes into a certain yoga position where she curls/ flexes the entire spine while the laying on the ground and flexes the neck with the forehead on the ground. Sometimes the migraine evolves anyway.      States cervical and trap mm pain worsens with using arm weights in the gym, 10 lbs or more. Reports left shoulder pain. Denies sensory changes, weakness, falls, imbalance, incontinence.     Did PT in the past which was helpful.     The patient is on OCP birth control and she is not planning pregnancy.      Review of Systems   Constitutional:  Negative for appetite change, fatigue and fever.   HENT: Negative.  Negative for hearing loss, tinnitus, trouble swallowing and voice change.    Eyes:  Positive for photophobia. Negative for pain and visual disturbance.   Respiratory: Negative.  Negative for shortness of breath.    Cardiovascular: Negative.  Negative for palpitations.   Gastrointestinal:  Positive for nausea and vomiting.   Endocrine: Negative.  Negative for cold intolerance.   Genitourinary: Negative.  Negative for dysuria, frequency and urgency.   Musculoskeletal:  Negative for back pain, gait problem, myalgias, neck pain and neck stiffness.   Skin:  Negative.  Negative for rash.   Allergic/Immunologic: Negative.    Neurological:  Positive for dizziness, light-headedness and headaches. Negative for tremors, seizures, syncope, facial asymmetry, speech difficulty, weakness and numbness.   Hematological: Negative.  Does not bruise/bleed easily.   Psychiatric/Behavioral: Negative.  Negative for confusion, hallucinations and sleep disturbance.      The following portions of the patient's history were reviewed and updated as appropriate: allergies, current medications/ medication history, past family history, past medical history, past social history, past surgical history and problem list.    Review of systems was reviewed and otherwise unremarkable from a neurological perspective.      Objective   There were no vitals taken for this visit.    Physical Exam  Neurological exam:  On neurologic exam, the patient is alert and oriented to time and place. Speech is fluent and articulate, and the patient follows commands appropriately. Judgment and affect appear normal.  Extraocular muscles are intact without nystagmus. Face is symmetric. Hearing is intact bilaterally. Motor examination reveals adequate range of motion.    Visit Time  Total Visit Duration: 15 min.

## 2025-01-20 ENCOUNTER — HOSPITAL ENCOUNTER (OUTPATIENT)
Dept: ULTRASOUND IMAGING | Facility: HOSPITAL | Age: 45
Discharge: HOME | End: 2025-01-20
Attending: OBSTETRICS & GYNECOLOGY
Payer: COMMERCIAL

## 2025-01-20 DIAGNOSIS — N83.201 RIGHT OVARIAN CYST: ICD-10-CM

## 2025-01-20 DIAGNOSIS — R10.2 PELVIC PAIN: ICD-10-CM

## 2025-01-20 PROCEDURE — 76830 TRANSVAGINAL US NON-OB: CPT

## 2025-01-20 PROCEDURE — 76856 US EXAM PELVIC COMPLETE: CPT

## 2025-01-23 ENCOUNTER — RESULTS FOLLOW-UP (OUTPATIENT)
Dept: OBGYN CLINIC | Facility: CLINIC | Age: 45
End: 2025-01-23

## 2025-02-27 DIAGNOSIS — G43.709 CHRONIC MIGRAINE WITHOUT AURA: ICD-10-CM

## 2025-02-28 ENCOUNTER — TELEPHONE (OUTPATIENT)
Dept: NEUROLOGY | Facility: CLINIC | Age: 45
End: 2025-02-28

## 2025-02-28 DIAGNOSIS — G43.709 CHRONIC MIGRAINE WITHOUT AURA: ICD-10-CM

## 2025-02-28 RX ORDER — RIMEGEPANT SULFATE 75 MG/75MG
TABLET, ORALLY DISINTEGRATING ORAL
Qty: 8 TABLET | Refills: 5 | Status: SHIPPED | OUTPATIENT
Start: 2025-02-28

## 2025-02-28 NOTE — TELEPHONE ENCOUNTER
Reason for call:   [] Refill   [x] Prior Auth -  Per Pt she was told by the pharmacy she needs an authorization from her provider.  [] Other:     Office:   [] PCP/Provider -   [x] Specialty/Provider - : Lou Snyder PA-C     Medication: Botox 100 units     Dose/Frequency: 200 units    Quantity: 2 each    Pharmacy: [facility] - [place], [place] - [address]      Does the patient have enough for 3 days?   [] Yes   [x] No - Send as HP to POD

## 2025-03-03 RX ORDER — RIMEGEPANT SULFATE 75 MG/75MG
TABLET, ORALLY DISINTEGRATING ORAL
Qty: 8 TABLET | Refills: 11 | Status: SHIPPED | OUTPATIENT
Start: 2025-03-03

## 2025-03-05 NOTE — TELEPHONE ENCOUNTER
Ann called in to set up delivery for botox as soon as possible   Please call [facility] : [phone].

## 2025-03-07 ENCOUNTER — TELEPHONE (OUTPATIENT)
Dept: NEUROLOGY | Facility: CLINIC | Age: 45
End: 2025-03-07

## 2025-03-07 ENCOUNTER — PROCEDURE VISIT (OUTPATIENT)
Dept: NEUROLOGY | Facility: CLINIC | Age: 45
End: 2025-03-07
Payer: COMMERCIAL

## 2025-03-07 VITALS — SYSTOLIC BLOOD PRESSURE: 112 MMHG | TEMPERATURE: 98.2 F | HEART RATE: 88 BPM | DIASTOLIC BLOOD PRESSURE: 74 MMHG

## 2025-03-07 DIAGNOSIS — G43.709 CHRONIC MIGRAINE WITHOUT AURA WITHOUT STATUS MIGRAINOSUS, NOT INTRACTABLE: Primary | ICD-10-CM

## 2025-03-07 PROCEDURE — 64615 CHEMODENERV MUSC MIGRAINE: CPT | Performed by: PHYSICIAN ASSISTANT

## 2025-03-07 NOTE — TELEPHONE ENCOUNTER
Botox number of units: 100  Botox quantity: 2  Arrived at what location: CV  Botox at Correct Administering Location: Y  NDC number: 7745-6420-89  Lot number: [id number]  Expiration Date: 05/30/27  Appt notes indicate correct medication:  Y

## 2025-03-07 NOTE — PROGRESS NOTES
Universal Protocol   Consent: Verbal consent obtained. Written consent obtained.  Risks and benefits: risks, benefits and alternatives were discussed  Consent given by: patient  Patient understanding: patient states understanding of the procedure being performed  Patient consent: the patient's understanding of the procedure matches consent given  Procedure consent: procedure consent matches procedure scheduled      Chemodenervation     Date/Time  3/7/2025 2:00 PM     Performed by  Lou Snyder PA-C   Authorized by  Lou Snyder PA-C     Pre-procedure details      Prepped With: Alcohol     Procedure details      Position:  Upright   Botox      Botox Type:  Type A    Brand:  Botox    mL's of Botulinum Toxin:  200    Final Concentration per CC:  100 units    Needle Gauge:  30 G 2.5 inch   Procedures      Botox Procedures: chronic headache      Indications: migraines     Injection Location      Head / Face:  L superior trapezius, R superior trapezius, L superior cervical paraspinal, R superior cervical paraspinal, L , R , procerus, L temporalis, R temporalis, R frontalis, L frontalis, R medial occipitalis and L medial occipitalis    L  injection amount:  5 unit(s)    R  injection amount:  5 unit(s)    L lateral frontalis:  5 unit(s)    R lateral frontalis:  5 unit(s)    L medial frontalis:  5 unit(s)    R medial frontalis:  5 unit(s)    L temporalis injection amount:  20 unit(s)    R temporalis injection amount:  20 unit(s)    Procerus injection amount:  5 unit(s)    L medial occipitalis injection amount:  15 unit(s)    R medial occipitalis injection amount:  15 unit(s)    L superior cervical paraspinal injection amount:  10 unit(s)    R superior cervical paraspinal injection amount:  10 unit(s)    L superior trapezius injection amount:  0 unit(s)    R superior trapezius injection amount:  0 unit(s)   Total Units      Total units used:  200    Total units discarded:  0    Post-procedure details      Chemodenervation:  Chronic migraine    Facial Nerve Location::  Bilateral facial nerve    Patient tolerance of procedure:  Tolerated well, no immediate complications   Comments        Extra units medically necessary:  - 10 between each orbicularis oculi (5 each side)  - 25 units between both splenius capitis muscles  - 40 between both anterior temporalis muscles  Avoided traps.       Blood pressure 112/74, pulse 88, temperature 98.2 °F (36.8 °C), temperature source Temporal, not currently breastfeeding.

## 2025-03-26 DIAGNOSIS — R10.32 CHRONIC LLQ PAIN: ICD-10-CM

## 2025-03-26 DIAGNOSIS — G89.29 CHRONIC LLQ PAIN: ICD-10-CM

## 2025-03-26 RX ORDER — HYOSCYAMINE SULFATE 0.12 MG/1
TABLET SUBLINGUAL
Qty: 540 TABLET | Refills: 1 | Status: SHIPPED | OUTPATIENT
Start: 2025-03-26

## 2025-05-22 ENCOUNTER — OFFICE VISIT (OUTPATIENT)
Dept: PHYSICAL THERAPY | Facility: REHABILITATION | Age: 45
End: 2025-05-22
Payer: COMMERCIAL

## 2025-05-22 DIAGNOSIS — R10.2 PELVIC PAIN: ICD-10-CM

## 2025-05-22 DIAGNOSIS — M62.89 PELVIC FLOOR TENSION: Primary | ICD-10-CM

## 2025-05-22 PROCEDURE — 97530 THERAPEUTIC ACTIVITIES: CPT | Performed by: PHYSICAL THERAPIST

## 2025-05-22 PROCEDURE — 97162 PT EVAL MOD COMPLEX 30 MIN: CPT | Performed by: PHYSICAL THERAPIST

## 2025-05-22 NOTE — PROGRESS NOTES
PT Evaluation     Today's date: 2025  Patient name: Amanda Calhoun  : 1980  MRN: [id number]  Referring provider: Katelyn Evans PT  Dx:   Encounter Diagnosis     ICD-10-CM    1. Pelvic floor tension  M62.89       2. Pelvic pain  R10.2                      Assessment  Impairments: abnormal coordination, abnormal muscle firing, abnormal muscle tone, abnormal or restricted ROM, activity intolerance, impaired physical strength, lacks appropriate home exercise program and pain with function  Symptom irritability: moderate    Assessment details: Amanda Calhoun is a 44 y.o. female who presents with concerns of pelvic pain. Symptoms have persisted for a few years Examination reveals L iliococcygeus and OI tenderness as well as poorly relaxing PFM.      The plan of care was discussed and included education regarding pelvic floor anatomy, explanation of exam technique, explanation of exam findings and discussion of treatment plan as well as the importance of patient compliance and adherence to physical therapy visits. Patient would benefit from skilled physical therapy services  to address deficits and ultimately meet goal of independent self management of condition.     Patient provided written and verbal consent for pelvic floor muscle exam: yes        Understanding of Dx/Px/POC: good     Prognosis: good    Goals  STGs to be met in 4 weeks:  * Patient will be compliant with introductory HEP as prescribed.  * Patient will report 40% less pain with daily activities.     LTGs to be met by discharge:  * Patient will present with a MIGNON on the PFDI to indicate improved function. (nonsurg MIGNON > 13.5 pts, surg MIGNON > 45 pts)   * Normalize findings on sEMG to indicate PFM strength average of at least 12uV and resting average < 2.5uV.  * Implements relaxation strategies on a daily basis.  * Patient will report 80% reduction in discomfort with either palpation or intimacy.  * Patient will be proficient and compliant  with use of vaginal dilator (s) for progression of self PFM stretching in 3-4 visits if indicated during treatment.    * Patient will be compliant with comprehensive home exercise program for self management of condition.       Plan  Patient would benefit from: skilled physical therapy    Frequency: 1x week  Duration in weeks: 12  Plan of Care beginning date: 5/22/2025  Plan of Care expiration date: 8/14/2025  Treatment plan discussed with: patient and PTA      PT Pelvic Floor Subjective:   History of Present Illness:   Patient has had multiple surgeries for ovarian cysts. L ovary has been removed (10cm cyst prior to removal). Since that surgery in 2021, she has had increase in UU. She is taking progesterone pills and using estrogen cream but urinary urgency persists. Spotting all the time and she also gets a regular menstrual cycle. 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)  Diet and Exercise:      Walks 4 days/week in good weather for 30-60'   OB/ gyn History    Gestational History:     Prior Pregnancy: Yes      Number of prior pregnancies: 2    Number of term pregnancies: 2    Delivery Type: vaginal delivery      Delivery Complications:  Children born in 2007 and 2009  Both full term, first delivery was with forceps    Menstrual History:    Date of last menstrual period: 5/5/2025    Menstrual irregularities regular menses    Painful periods:  Difficulty managing menstrual pain    Tolerates tampons: yes    Menopausal: no menopause    Contraceptive use: vasectomy.  Heavy bleeding, pain has reduced since starting Progesterone (2-3 years ago).    Bladder Function:     Voiding Difficulties positive for: urgency, frequent urination, hesitancy, straining and incomplete emptying       Voiding Difficulties comments:     Voiding frequency:  every 1-2 hours and every 31-60 minutes    Urinary leakage: no urine leakage    Urinary leakage aggravated by: key-in-the-door syndrome, seeing a toilet and anxiety    Nocturia (episodes per night): 0    Painful urination: No      Intake (ounces): Water intake (oz): 24 oz at work, 30-40 oz home. Coffee intake (oz): 12 oz (minimum)   Bowel Function:     Voiding DIfficulties: unfinished feeling after defecating and constipation      Bowel Function comments:  Daily Metamucil helps (doesn't always take it and she can goes days if she misses a dose).     Bowel frequency: daily, every 2 days and every 3 days    [place] Stool Scale: type 4, type 1 and type 2  Sexual Function:     Sexually Active:  Sexually active    Pain during intercourse: No      Lubrication Use: No  Sexual function: able to achieve orgasm  Pain:     Current pain ratin    At best pain ratin    At worst pain rating:  3    Location:  L LQ sharp pain ( went away recently), achy low abdominal pain    Onset:  1-2 years ago  Diagnostic Tests:     None    Treatments:     None    Patient Goals:     Patient goals for therapy:  Improved quality of life, fully empty bladder or bowels, improved bladder or bowel function, improved comfort and relaxation      Objective       Abdominal Assessment:      Abdominal Assessment: N/a      General Perineum Exam:   perineum intact.     General perineum exam comments: No unusual discharge, irritation, organ prolapse or skin breakdown evident            Visual Inspection of Perineum:   Excursion of perineal body in cephalad direction with contraction of pelvic floor muscles (PFM): weak and fair   Excursion of perineal body in caudal direction with relaxation of pelvic floor muscles (PFM): weak  [place] swab test: non-tender  Sphincter Tone Resting: normal  Sphincter Tone Squeeze: normal  Sensation: intact    Pelvic Organ Prolapse   no pelvic organ prolapse        Pelvic Floor Muscle Exam:      Breathing pattern with  contraction: holding breath   Pelvic floor muscle relaxation is incomplete.   25% pelvic floor relaxation        PERFECT Score   Power right: 1+/5   Power left: 1+/5   Endurance (seconds to max): 4         pelvic floor exam consent given by patient    Pelvic exam completed: vaginally              Precautions: Problem List[1]R ovarian cyst, constipation,         POC Expires Auth Status Start Date Exp Date PT Visit Limit DA expires DA provider   8/14 6/20          Date of Service 5/22           Visits Used            Visits Remaining                        Manuals                                                            Neuro Re-Ed                                                                                                Ther Ex                                                                                    Ther Activity            Education Anatomy and POC                                                                        Modalities                                             [1]   Patient Active Problem List  Diagnosis    Headache    Chronic migraine without aura without status migrainosus, not intractable    Myofascial pain syndrome, cervical    Preventative health care    Family history of first degree relative with bicuspid aortic valve    Murmur    Menstrual migraine without status migrainosus, not intractable    Chronic migraine without aura    Right ovarian cyst    Pelvic pain    Constipation    Chronic LLQ pain

## 2025-05-28 NOTE — PROGRESS NOTES
Daily Note     Today's date: 2025  Patient name: Amanda Calhoun  : 1980  MRN: [id number]  Referring provider: Katelyn Evans, JANET  Dx:   Encounter Diagnosis     ICD-10-CM    1. Pelvic floor tension  M62.89       2. Pelvic pain  R10.2         Patient has had multiple surgeries for ovarian cysts. L ovary has been removed (10cm cyst prior to removal). Since that surgery in , she has had increase in UU. She is taking progesterone pills and using estrogen cream but urinary urgency persists. Spotting all the time and she also gets a regular menstrual cycle. H/o ablation in 2018             Subjective: Pt has been thinking about urgency and waiting for 2 hours as able with some success. If she feels anxious though she notices that urgency becomes more uncomfortable and more difficult to suppress. She drank 6 oz of coffee and approx 4 oz of water prior to visit.     Tends to do intermittent fasting where she first eats around 4pm and is doing 8:16 intermittent fasting. This will be modified in the near future to add more protein into diet.       Objective: See treatment diary below      Assessment: Tolerated treatment well. Patient would benefit from continued PT. Hip streching performed  in supine for HS and adductors bilaterally with good response. Will defer next session. Introduced DB for ANS quieting and muscle relaxation. Did well with technique and understanidng and was issued HEP handout to perform this at least 5 minutes a day. MT performed intravaginally for PFM release and stretching with elevated tone noted along L PC/IC. This should be re-checked nv and MFR applied with concurrent DB nv.      Plan: Continue per plan of care.      Precautions: Problem List[1]R ovarian cyst, constipation,         POC Expires Auth Status Start Date Exp Date PT Visit Limit DA expires DA provider             Date of Service           Visits Used            Visits Remaining                         Manuals            Hip distraction and streching  HS and Add 10'          PFM stretching  15'                                  Neuro Re-Ed            DB  15'          Education   ANS training 8'                                                                      Ther Ex            Aerobic muscle warmup   TM 10' 2.5 mph                                                                      Ther Activity            Education Anatomy and POC                                                                        Modalities                                                      [1]   Patient Active Problem List  Diagnosis    Headache    Chronic migraine without aura without status migrainosus, not intractable    Myofascial pain syndrome, cervical    Preventative health care    Family history of first degree relative with bicuspid aortic valve    Murmur    Menstrual migraine without status migrainosus, not intractable    Chronic migraine without aura    Right ovarian cyst    Pelvic pain    Constipation    Chronic LLQ pain

## 2025-05-29 ENCOUNTER — OFFICE VISIT (OUTPATIENT)
Dept: PHYSICAL THERAPY | Facility: REHABILITATION | Age: 45
End: 2025-05-29
Payer: COMMERCIAL

## 2025-05-29 DIAGNOSIS — M62.89 PELVIC FLOOR TENSION: Primary | ICD-10-CM

## 2025-05-29 DIAGNOSIS — R10.2 PELVIC PAIN: ICD-10-CM

## 2025-05-29 PROCEDURE — 97110 THERAPEUTIC EXERCISES: CPT | Performed by: PHYSICAL THERAPIST

## 2025-05-29 PROCEDURE — 97140 MANUAL THERAPY 1/> REGIONS: CPT | Performed by: PHYSICAL THERAPIST

## 2025-05-29 PROCEDURE — 97112 NEUROMUSCULAR REEDUCATION: CPT | Performed by: PHYSICAL THERAPIST

## 2025-05-29 NOTE — HOME EXERCISE EDUCATION
Program_ID:[id number]   Access Code: [email or web address]  URL: [email or web address]  Date: 05-  Prepared By: Katelyn Evans    Program Notes      Exercises      - Supine Diaphragmatic Breathing - 1-2 x daily - 7 x weekly -  sets -  reps

## 2025-06-03 NOTE — PROGRESS NOTES
Daily Note     Today's date: 6/3/2025  Patient name: Amanda Calhoun  : 1980  MRN: [id number]  Referring provider: Katelyn Evans, JANET  Dx:   No diagnosis found.    Patient has had multiple surgeries for ovarian cysts. L ovary has been removed (10cm cyst prior to removal). Since that surgery in , she has had increase in UU. She is taking progesterone pills and using estrogen cream but urinary urgency persists. Spotting all the time and she also gets a regular menstrual cycle. H/o ablation in 2018             Subjective: Pt has been thinking about urgency and waiting for 2 hours as able with some success. If she feels anxious though she notices that urgency becomes more uncomfortable and more difficult to suppress. She drank 6 oz of coffee and approx 4 oz of water prior to visit.     Tends to do intermittent fasting where she first eats around 4pm and is doing 8:16 intermittent fasting. This will be modified in the near future to add more protein into diet.       Objective: See treatment diary below      Assessment: Tolerated treatment well. Patient would benefit from continued PT. Hip streching performed  in supine for HS and adductors bilaterally with good response. Will defer next session. Introduced DB for ANS quieting and muscle relaxation. Did well with technique and understanidng and was issued HEP handout to perform this at least 5 minutes a day. MT performed intravaginally for PFM release and stretching with elevated tone noted along L PC/IC. This should be re-checked nv and MFR applied with concurrent DB nv.      Plan: Continue per plan of care.      Precautions: Problem List[1]R ovarian cyst, constipation,         POC Expires Auth Status Start Date Exp Date PT Visit Limit DA expires DA provider             Date of Service  DA chart         Visits Used            Visits Remaining                        Manuals            Hip distraction and streching  HS and Add 10'           PFM stretching  15'                                  Neuro Re-Ed            DB  15'          Education   ANS training 8'                                                                      Ther Ex            Aerobic muscle warmup   TM 10' 2.5 mph                                                                      Ther Activity            Education Anatomy and POC                                                                        Modalities                                                      [1]   Patient Active Problem List  Diagnosis    Headache    Chronic migraine without aura without status migrainosus, not intractable    Myofascial pain syndrome, cervical    Preventative health care    Family history of first degree relative with bicuspid aortic valve    Murmur    Menstrual migraine without status migrainosus, not intractable    Chronic migraine without aura    Right ovarian cyst    Pelvic pain    Constipation    Chronic LLQ pain

## 2025-06-05 ENCOUNTER — APPOINTMENT (OUTPATIENT)
Dept: PHYSICAL THERAPY | Facility: REHABILITATION | Age: 45
End: 2025-06-05
Payer: COMMERCIAL

## 2025-06-09 ENCOUNTER — OFFICE VISIT (OUTPATIENT)
Dept: PHYSICAL THERAPY | Facility: REHABILITATION | Age: 45
End: 2025-06-09
Payer: COMMERCIAL

## 2025-06-09 DIAGNOSIS — R10.2 PELVIC PAIN: ICD-10-CM

## 2025-06-09 DIAGNOSIS — M62.89 PELVIC FLOOR TENSION: Primary | ICD-10-CM

## 2025-06-09 PROCEDURE — 97110 THERAPEUTIC EXERCISES: CPT | Performed by: PHYSICAL THERAPIST

## 2025-06-09 PROCEDURE — 97140 MANUAL THERAPY 1/> REGIONS: CPT | Performed by: PHYSICAL THERAPIST

## 2025-06-09 PROCEDURE — 97530 THERAPEUTIC ACTIVITIES: CPT | Performed by: PHYSICAL THERAPIST

## 2025-06-09 NOTE — PROGRESS NOTES
Daily Note     Today's date: 2025  Patient name: Amanda Calhoun  : 1980  MRN: [id number]  Referring provider: Katelyn Evans, PT  Dx:   Encounter Diagnosis     ICD-10-CM    1. Pelvic floor tension  M62.89       2. Pelvic pain  R10.2           Patient has had multiple surgeries for ovarian cysts. L ovary has been removed (10cm cyst prior to removal). Since that surgery in , she has had increase in UU. She is taking progesterone pills and using estrogen cream but urinary urgency persists. Spotting all the time and she also gets a regular menstrual cycle. H/o ablation in 2018             Subjective: She drank about 4 oz of coffee and water prior to visit. Pt reports today her anxiety and urge is heightened. She is still able to wait about 2 hours with some success. No nocturia. She continues to do intermittent fasting until 4 pm.       Objective: See treatment diary below      Assessment: Tolerated treatment well. Patient would benefit from continued PT. Provided pt with urge suppression strategies handout, demonstrated understanding.       Hip streching performed  in supine for HS and adductors bilaterally with good response. Will defer next session. Introduced DB for ANS quieting and muscle relaxation. Did well with technique and understanidng and was issued HEP handout to perform this at least 5 minutes a day. MT performed intravaginally for PFM release and stretching with elevated tone noted along L PC/IC. This should be re-checked nv and MFR applied with concurrent DB nv.      Plan: Continue per plan of care.      Precautions: Problem List[1]R ovarian cyst, constipation,         POC Expires Auth Status Start Date Exp Date PT Visit Limit DA expires DA provider             Date of Service          Visits Used            Visits Remaining                        Manuals            Hip distraction and streching  HS and Add 10'          PFM stretching  15'                                   Neuro Re-Ed            DB  15'          Education   ANS training 8'                                                                      Ther Ex            Aerobic muscle warmup   TM 10' 2.5 mph TM 10' 2.5 mph                                                                      Ther Activity            Education Anatomy and POC   Urge suppression strategies,                                                                      Modalities                                                      [1]   Patient Active Problem List  Diagnosis    Headache    Chronic migraine without aura without status migrainosus, not intractable    Myofascial pain syndrome, cervical    Preventative health care    Family history of first degree relative with bicuspid aortic valve    Murmur    Menstrual migraine without status migrainosus, not intractable    Chronic migraine without aura    Right ovarian cyst    Pelvic pain    Constipation    Chronic LLQ pain

## 2025-06-09 NOTE — PROGRESS NOTES
Daily Note     Today's date: 2025  Patient name: Amanda Calhoun  : 1980  MRN: [id number]  Referring provider: Katelyn Evans, JANET  Dx:   Encounter Diagnosis     ICD-10-CM    1. Pelvic floor tension  M62.89       2. Pelvic pain  R10.2           Patient has had multiple surgeries for ovarian cysts. L ovary has been removed (10cm cyst prior to removal). Since that surgery in , she has had increase in UU. She is taking progesterone pills and using estrogen cream but urinary urgency persists. Spotting all the time and she also gets a regular menstrual cycle. H/o ablation in 2018             Subjective: Pt drank approximately 4 oz of coffee and water this morning prior to treatment. She reports heightened anxiety and urge today. No nocturia. She continues to do intermittent fasting until 4 pm everyday.         Objective: See treatment diary below      Assessment: Tolerated treatment well. Patient would benefit from continued PT. Pt provided with urge suppression strategies handout, demonstrated understanding. MT performed intravaginally for PFM release and stretching with elevated tone noted along L periurethral, diminished with stretching. Provided with happy baby and butterfly stretch for HEP, demonstrated understanding. NV check motor control, relaxation and quick contractions and start exercises.       Plan: Continue per plan of care.      Precautions: Problem List[1]R ovarian cyst, constipation,         POC Expires Auth Status Start Date Exp Date PT Visit Limit DA expires DA provider        Signed           Date of Service          Visits Used            Visits Remaining            Treatment completed by Clarisa Moctezuma, SPT with direct supervision and collaboration by Katelyn Evans, PT, DPT.      X         Manuals            Hip distraction and streching  HS and Add 10'          PFM stretching  15' 15' LG                                 Neuro Re-Ed            DB  15'          Education  "  ANS training 8'                                                                      Ther Ex            Aerobic muscle warmup   TM 10' 2.5 mph TM 10' 2.5 mph         Happy baby stretch   3x30\" HEP         Butterfly stretch   3x30\" HEP                                             Ther Activity            Education Anatomy and POC   Urge suppression strategies                                                                      Modalities                                                      [1]   Patient Active Problem List  Diagnosis    Headache    Chronic migraine without aura without status migrainosus, not intractable    Myofascial pain syndrome, cervical    Preventative health care    Family history of first degree relative with bicuspid aortic valve    Murmur    Menstrual migraine without status migrainosus, not intractable    Chronic migraine without aura    Right ovarian cyst    Pelvic pain    Constipation    Chronic LLQ pain     "

## 2025-06-26 ENCOUNTER — OFFICE VISIT (OUTPATIENT)
Dept: PHYSICAL THERAPY | Facility: REHABILITATION | Age: 45
End: 2025-06-26
Payer: COMMERCIAL

## 2025-06-26 DIAGNOSIS — R10.2 PELVIC PAIN: ICD-10-CM

## 2025-06-26 DIAGNOSIS — M62.89 PELVIC FLOOR TENSION: Primary | ICD-10-CM

## 2025-06-26 PROCEDURE — 97112 NEUROMUSCULAR REEDUCATION: CPT | Performed by: PHYSICAL THERAPIST

## 2025-06-26 PROCEDURE — 97110 THERAPEUTIC EXERCISES: CPT | Performed by: PHYSICAL THERAPIST

## 2025-06-26 PROCEDURE — 97530 THERAPEUTIC ACTIVITIES: CPT | Performed by: PHYSICAL THERAPIST

## 2025-06-26 PROCEDURE — 97140 MANUAL THERAPY 1/> REGIONS: CPT | Performed by: PHYSICAL THERAPIST

## 2025-06-26 NOTE — PROGRESS NOTES
Daily Note     Today's date: 2025  Patient name: Amanda Calhoun  : 1980  MRN: [id number]  Referring provider: Katelyn Evans, PT  Dx:   Encounter Diagnosis     ICD-10-CM    1. Pelvic floor tension  M62.89       2. Pelvic pain  R10.2             Patient has had multiple surgeries for ovarian cysts. L ovary has been removed (10cm cyst prior to removal). Since that surgery in , she has had increase in UU. She is taking progesterone pills and using estrogen cream but urinary urgency persists. Spotting all the time and she also gets a regular menstrual cycle. H/o ablation in 2018             Subjective: Pt reports she is still experiencing urge multiple times/week, able to make it to bathroom w/o leaking, usually occurs when she in out of the house. She is unsure if she is performing the urge suppression strategies and relaxation correctly. Pt reports onset of knee pain and swelling after [place] and walking during vacation.       Objective: See treatment diary below      Assessment: Tolerated treatment well. Patient would benefit from continued PT. Performed internal exam to assess PFM coordination and ability to relax, pt still has difficulty with relaxation ~20-25% and motor control of PFM. Pt reported ongoing urge sensation t/o exam and session, able to suppress with DB instruction and distraction strategy of counting down from 7s. NV incorporate down training and ANS relaxation. Pt educated on performing urge suppression strategies with cognition task and quick flicks, pt demonstrated understanding.       Plan: Continue per plan of care.      Precautions: Problem List[1]R ovarian cyst, constipation,         POC Expires Auth Status Start Date Exp Date PT Visit Limit DA expires DA provider        Signed           Date of Service         Visits Used            Visits Remaining            Treatment completed by GREY Denis with direct supervision and collaboration by Katelyn  "Cristina, PT, DPT.      X X        Manuals            Hip distraction and streching  HS and Add 10'          PFM stretching  15' 15' LG 10' LG         PFM assessment    5' LG                     Neuro Re-Ed            DB  15'  5'        Education   ANS training 8'          Urge Suppression     DB + LTR + counting down by 7s with PFM stretching and release                                                         Ther Ex            Aerobic muscle warmup   TM 10' 2.5 mph TM 10' 2.5 mph TM 8' 2.5 mph         Happy baby stretch   3x30\" HEP         Butterfly stretch   3x30\" HEP                                             Ther Activity            Education Anatomy and POC   Urge suppression strategies  Urge suppression strategies, PNS relaxation                                                                     Modalities                                                      [1]   Patient Active Problem List  Diagnosis    Headache    Chronic migraine without aura without status migrainosus, not intractable    Myofascial pain syndrome, cervical    Preventative health care    Family history of first degree relative with bicuspid aortic valve    Murmur    Menstrual migraine without status migrainosus, not intractable    Chronic migraine without aura    Right ovarian cyst    Pelvic pain    Constipation    Chronic LLQ pain     "

## 2025-07-11 ENCOUNTER — OFFICE VISIT (OUTPATIENT)
Dept: PHYSICAL THERAPY | Facility: REHABILITATION | Age: 45
End: 2025-07-11
Payer: COMMERCIAL

## 2025-07-11 DIAGNOSIS — R10.2 PELVIC PAIN: ICD-10-CM

## 2025-07-11 DIAGNOSIS — M62.89 PELVIC FLOOR TENSION: Primary | ICD-10-CM

## 2025-07-11 PROCEDURE — 97112 NEUROMUSCULAR REEDUCATION: CPT | Performed by: PHYSICAL THERAPIST

## 2025-07-11 PROCEDURE — 97530 THERAPEUTIC ACTIVITIES: CPT | Performed by: PHYSICAL THERAPIST

## 2025-07-11 PROCEDURE — 97110 THERAPEUTIC EXERCISES: CPT | Performed by: PHYSICAL THERAPIST

## 2025-07-11 NOTE — PROGRESS NOTES
Daily Note     Today's date: 2025  Patient name: Amanda Calhoun  : 1980  MRN: [id number]  Referring provider: Katelyn Evans, PT  Dx:   Encounter Diagnosis     ICD-10-CM    1. Pelvic floor tension  M62.89       2. Pelvic pain  R10.2               Patient has had multiple surgeries for ovarian cysts. L ovary has been removed (10cm cyst prior to removal). Since that surgery in , she has had increase in UU. She is taking progesterone pills and using estrogen cream but urinary urgency persists. Spotting all the time and she also gets a regular menstrual cycle. H/o ablation in 2018             Subjective: Pt reports she still has urge when going to the store and traveling. Pt leaving for vacation on Tuesday, nervous for 4 hour flight, doesn't want to use bathroom more than once on plane. She is using urge suppression strategies when in line at store, able to suppress for few minutes. Pt still unsure if she is feeling relaxation within PFM.        Objective: See treatment diary below      Assessment: Tolerated treatment well. Patient would benefit from continued PT. Provided pt with suggestions for urge strategies when traveling next week, avoid bladder irritants, practice bladder re-training every 2 hours, perform DB + quick flicks with feeling of urgency. Performed pelvic mobility exercises, pt rigid with movement and had difficulty isolating pelvis from lumbar movement. Added PFM strengthening with emphasis on breathing, pt had difficulty with relationship with breath and kegel, improvement with emphasis of rest in between each rep. Provided HEP with PFMC in supine and focus on breath. NV assess PFM coordination.       Plan: Continue per plan of care.      Precautions: Problem List[1]R ovarian cyst, constipation,         POC Expires Auth Status Start Date Exp Date PT Visit Limit DA expires DA provider        Signed           Date of Service        Visits Used           "  Visits Remaining            Treatment completed by Clarisa Moctezuma, SPT with direct supervision and collaboration by Katelyn Evans, PT, DPT.      X X X       Manuals            Hip distraction and streching  HS and Add 10'          PFM stretching  15' 15' LG 10' LG         PFM assessment    5' LG                     Neuro Re-Ed            DB  15'  5'        Education   ANS training 8'          Urge Suppression     DB + LTR + counting down by 7s with PFM stretching and release         PFMC supine       3\" x10        PFMC sitting     5x        PFM + hip abd/add     3\" x10        PFM + bridge      10x                   Ther Ex            Aerobic muscle warmup   TM 10' 2.5 mph TM 10' 2.5 mph TM 8' 2.5 mph  TM 10' 2.5 mph       Happy baby stretch   3x30\" HEP         Butterfly stretch   3x30\" HEP         LTR     10x       Cat/cow     10x       Physioball A/P, R/L, CW, CCW circles     10x ea       Ther Activity            Education Anatomy and POC   Urge suppression strategies  Urge suppression strategies, PNS relaxation  Urge suppression, compression socks                                                                   Modalities                                                      [1]   Patient Active Problem List  Diagnosis    Headache    Chronic migraine without aura without status migrainosus, not intractable    Myofascial pain syndrome, cervical    Preventative health care    Family history of first degree relative with bicuspid aortic valve    Murmur    Menstrual migraine without status migrainosus, not intractable    Chronic migraine without aura    Right ovarian cyst    Pelvic pain    Constipation    Chronic LLQ pain     "

## 2025-07-17 ENCOUNTER — TELEPHONE (OUTPATIENT)
Dept: NEUROLOGY | Facility: CLINIC | Age: 45
End: 2025-07-17

## 2025-07-17 NOTE — TELEPHONE ENCOUNTER
Botox number of units: 100  Botox quantity: 2  Arrived at what location: [facility]  Botox at Correct Administering Location: yes  NDC number: 5117-5313-38  Lot number: [id number]  Expiration Date: 10/2027  Appt notes indicate correct medication: yes

## 2025-07-20 DIAGNOSIS — G43.709 CHRONIC MIGRAINE WITHOUT AURA WITHOUT STATUS MIGRAINOSUS, NOT INTRACTABLE: ICD-10-CM

## 2025-07-21 RX ORDER — SUMATRIPTAN 6 MG/.5ML
INJECTION, SOLUTION SUBCUTANEOUS
Qty: 2.5 ML | Refills: 5 | Status: SHIPPED | OUTPATIENT
Start: 2025-07-21

## 2025-07-25 ENCOUNTER — PROCEDURE VISIT (OUTPATIENT)
Dept: NEUROLOGY | Facility: CLINIC | Age: 45
End: 2025-07-25
Payer: COMMERCIAL

## 2025-07-25 VITALS
SYSTOLIC BLOOD PRESSURE: 104 MMHG | TEMPERATURE: 98.1 F | OXYGEN SATURATION: 100 % | DIASTOLIC BLOOD PRESSURE: 78 MMHG | HEART RATE: 94 BPM

## 2025-07-25 DIAGNOSIS — G43.709 CHRONIC MIGRAINE WITHOUT AURA WITHOUT STATUS MIGRAINOSUS, NOT INTRACTABLE: Primary | ICD-10-CM

## 2025-07-25 PROCEDURE — 64615 CHEMODENERV MUSC MIGRAINE: CPT | Performed by: PHYSICIAN ASSISTANT

## 2025-07-25 NOTE — PROGRESS NOTES
Universal Protocol   Consent: Verbal consent obtained. Written consent obtained  Risks and benefits: risks, benefits and alternatives were discussed  Consent given by: patient  Patient understanding: patient states understanding of the procedure being performed  Patient consent: the patient's understanding of the procedure matches consent given  Procedure consent: procedure consent matches procedure scheduled      Chemodenervation     Date/Time  7/25/2025 10:30 AM     Performed by  Lou Snyder PA-C   Authorized by  Lou Snyder PA-C     Pre-procedure details      Prepped With: Alcohol     Procedure details      Position:  Upright   Botox      Botox Type:  Type A    Brand:  Botox    mL's of Botulinum Toxin:  200    Final Concentration per CC:  100 units    Needle Gauge:  30 G 2.5 inch   Procedures      Botox Procedures: chronic headache      Indications: migraines     Injection Location      Head / Face:  L superior trapezius, R superior trapezius, L superior cervical paraspinal, R superior cervical paraspinal, L , R , procerus, L temporalis, R temporalis, R frontalis, L frontalis, R medial occipitalis and L medial occipitalis    L  injection amount:  5 unit(s)    R  injection amount:  5 unit(s)    L lateral frontalis:  5 unit(s)    R lateral frontalis:  5 unit(s)    L medial frontalis:  5 unit(s)    R medial frontalis:  5 unit(s)    L temporalis injection amount:  20 unit(s)    R temporalis injection amount:  20 unit(s)    Procerus injection amount:  5 unit(s)    L medial occipitalis injection amount:  15 unit(s)    R medial occipitalis injection amount:  15 unit(s)    L superior cervical paraspinal injection amount:  10 unit(s)    R superior cervical paraspinal injection amount:  10 unit(s)    L superior trapezius injection amount:  0 unit(s)    R superior trapezius injection amount:  0 unit(s)    Comments:  No u   Total Units      Total units used:  200    Total units  discarded:  0   Post-procedure details      Chemodenervation:  Chronic migraine    Facial Nerve Location::  Bilateral facial nerve    Patient tolerance of procedure:  Tolerated well, no immediate complications   Comments       Extra units medically necessary:  - 10 between each orbicularis oculi (5 each side)  - 25 units between both splenius capitis muscles  - 30 between both anterior temporalis muscles  - 5 each masseter (10 total)  Avoided traps.       SUPINE for front.  Ice pack, cold spray used.    Blood pressure 104/78, pulse 94, temperature 98.1 °F (36.7 °C), temperature source Temporal, SpO2 100%, not currently breastfeeding.    She wants to increase interval of botox injections to reduce botox shots.

## 2025-08-12 ENCOUNTER — OFFICE VISIT (OUTPATIENT)
Dept: PHYSICAL THERAPY | Facility: REHABILITATION | Age: 45
End: 2025-08-12
Payer: COMMERCIAL

## (undated) DEVICE — GLOVE INDICATOR PI UNDERGLOVE SZ 7 BLUE

## (undated) DEVICE — TROCAR: Brand: KII® SLEEVE

## (undated) DEVICE — STERILE 8 INCH PROCTO SWAB: Brand: CARDINAL HEALTH

## (undated) DEVICE — ANTI-FOG SOLUTION WITH FOAM PAD: Brand: DEVON

## (undated) DEVICE — PACK PBDS MINOR GYN LAP RF

## (undated) DEVICE — GLOVE PI ULTRA TOUCH SZ.6.5

## (undated) DEVICE — GLOVE SRG BIOGEL 7

## (undated) DEVICE — CYSTO TUBING SINGLE IRRIGATION

## (undated) DEVICE — SYRINGE 10ML LL

## (undated) DEVICE — TISSUE RETRIEVAL SYSTEM: Brand: INZII RETRIEVAL SYSTEM

## (undated) DEVICE — GLOVE INDICATOR PI UNDERGLOVE SZ 6.5 BLUE

## (undated) DEVICE — ADHESIVE SKIN HIGH VISCOSITY EXOFIN 1ML

## (undated) DEVICE — SCD SEQUENTIAL COMPRESSION COMFORT SLEEVE MEDIUM KNEE LENGTH: Brand: KENDALL SCD

## (undated) DEVICE — ENDOPATH XCEL BLADELESS TROCARS WITH STABILITY SLEEVES: Brand: ENDOPATH XCEL

## (undated) DEVICE — ENSEAL LAPAROSCOPIC TISSUE SEALER G2 CURVED JAW FOR USE WITH G2 GENERATOR 5MM DIAMETER 35CM SHAFT LENGTH: Brand: ENSEAL

## (undated) DEVICE — TROCAR: Brand: KII FIOS FIRST ENTRY

## (undated) DEVICE — MAYO STAND COVER: Brand: CONVERTORS

## (undated) DEVICE — STRL ALLENTOWN HYSTEROSCOPY PK: Brand: CARDINAL HEALTH

## (undated) DEVICE — TRAY FOLEY 16FR URIMETER SURESTEP

## (undated) DEVICE — STERILE MINERVA DISPOSABLE HANDPIECECONTENTS:(1) ONE SINGLE USE STERILE MINERVA ES DISPOSABLE HANDPIECE (1) ONE SINGLE USE STERILE SYRINGE(1) ONE SINGLE USE STERILE 8MM HEGAR DILATOR(1) ONE SINGLE USE NON-STERILE DESICCANT(1) ONE NON-STERILE HANDPIECE INSTRUCTIONS FOR USE(1)  ONE NON-STERILE DILATOR INSTRUCTIONS FOR USE: Brand: MINERVA SINGLE STERILE DISPOSABLE HANDPIECE

## (undated) DEVICE — INSUFLATION TUBING INSUFLOW (LEXION)

## (undated) DEVICE — PREMIUM DRY TRAY LF: Brand: MEDLINE INDUSTRIES, INC.

## (undated) DEVICE — 2000CC GUARDIAN II: Brand: GUARDIAN

## (undated) DEVICE — GLOVE SRG BIOGEL 6.5

## (undated) DEVICE — CHLORAPREP HI-LITE 26ML ORANGE

## (undated) DEVICE — STRAIGHT CATH RED RUBBER 12FR

## (undated) DEVICE — INTENDED FOR TISSUE SEPARATION, AND OTHER PROCEDURES THAT REQUIRE A SHARP SURGICAL BLADE TO PUNCTURE OR CUT.: Brand: BARD-PARKER SAFETY BLADES SIZE 11, STERILE

## (undated) DEVICE — IV EXTENSION TUBING 33 IN